# Patient Record
Sex: FEMALE | Race: WHITE | NOT HISPANIC OR LATINO | URBAN - METROPOLITAN AREA
[De-identification: names, ages, dates, MRNs, and addresses within clinical notes are randomized per-mention and may not be internally consistent; named-entity substitution may affect disease eponyms.]

---

## 2017-06-27 ENCOUNTER — HISTORICAL (OUTPATIENT)
Dept: LAB | Facility: HOSPITAL | Age: 82
End: 2017-06-27

## 2018-04-16 ENCOUNTER — HISTORICAL (OUTPATIENT)
Dept: ADMINISTRATIVE | Facility: HOSPITAL | Age: 83
End: 2018-04-16

## 2018-04-16 ENCOUNTER — HISTORICAL (OUTPATIENT)
Dept: LAB | Facility: HOSPITAL | Age: 83
End: 2018-04-16

## 2018-12-06 ENCOUNTER — HISTORICAL (OUTPATIENT)
Dept: LAB | Facility: HOSPITAL | Age: 83
End: 2018-12-06

## 2019-05-13 ENCOUNTER — HISTORICAL (OUTPATIENT)
Dept: LAB | Facility: HOSPITAL | Age: 84
End: 2019-05-13

## 2019-05-13 LAB
ALBUMIN SERPL-MCNC: 3.9 GM/DL (ref 3.4–5)
ALBUMIN/GLOB SERPL: 1.1 {RATIO}
ALP SERPL-CCNC: 61 UNIT/L (ref 38–126)
ALT SERPL-CCNC: 21 UNIT/L (ref 12–78)
APPEARANCE, UA: ABNORMAL
AST SERPL-CCNC: 18 UNIT/L (ref 15–37)
BACTERIA SPEC CULT: ABNORMAL /HPF
BILIRUB SERPL-MCNC: 0.4 MG/DL (ref 0.2–1)
BILIRUB UR QL STRIP: NEGATIVE
BILIRUBIN DIRECT+TOT PNL SERPL-MCNC: 0.1 MG/DL (ref 0–0.2)
BILIRUBIN DIRECT+TOT PNL SERPL-MCNC: 0.3 MG/DL (ref 0–0.8)
BUN SERPL-MCNC: 37 MG/DL (ref 7–18)
CALCIUM SERPL-MCNC: 9.4 MG/DL (ref 8.5–10.1)
CHLORIDE SERPL-SCNC: 102 MMOL/L (ref 98–107)
CO2 SERPL-SCNC: 29 MMOL/L (ref 21–32)
COLOR UR: YELLOW
CREAT SERPL-MCNC: 1.17 MG/DL (ref 0.55–1.02)
EST. AVERAGE GLUCOSE BLD GHB EST-MCNC: 183 MG/DL
GLOBULIN SER-MCNC: 3.6 GM/DL (ref 2.4–3.5)
GLUCOSE (UA): NEGATIVE
GLUCOSE SERPL-MCNC: 117 MG/DL (ref 74–106)
HBA1C MFR BLD: 8 % (ref 4.2–6.3)
HGB UR QL STRIP: ABNORMAL
KETONES UR QL STRIP: NEGATIVE
LEUKOCYTE ESTERASE UR QL STRIP: ABNORMAL
NITRITE UR QL STRIP: NEGATIVE
PH UR STRIP: 5 [PH] (ref 5–9)
POTASSIUM SERPL-SCNC: 4.2 MMOL/L (ref 3.5–5.1)
PROT SERPL-MCNC: 7.5 GM/DL (ref 6.4–8.2)
PROT UR QL STRIP: NEGATIVE
RBC #/AREA URNS HPF: 7 /HPF (ref 0–2)
SODIUM SERPL-SCNC: 137 MMOL/L (ref 136–145)
SP GR UR STRIP: 1.02 (ref 1–1.03)
SQUAMOUS EPITHELIAL, UA: 6 /HPF (ref 0–4)
UROBILINOGEN UR STRIP-ACNC: 0.2
WBC #/AREA URNS HPF: 299 /HPF (ref 0–3)

## 2022-04-07 ENCOUNTER — HISTORICAL (OUTPATIENT)
Dept: ADMINISTRATIVE | Facility: HOSPITAL | Age: 87
End: 2022-04-07

## 2022-04-24 VITALS
HEIGHT: 66 IN | DIASTOLIC BLOOD PRESSURE: 50 MMHG | WEIGHT: 155.44 LBS | SYSTOLIC BLOOD PRESSURE: 98 MMHG | BODY MASS INDEX: 24.98 KG/M2

## 2025-01-21 ENCOUNTER — HOSPITAL ENCOUNTER (INPATIENT)
Facility: HOSPITAL | Age: 89
LOS: 6 days | Discharge: NURSING, REHAB OR LONG-TERM CARE | DRG: 312 | End: 2025-01-27
Attending: EMERGENCY MEDICINE | Admitting: INTERNAL MEDICINE
Payer: MEDICARE

## 2025-01-21 DIAGNOSIS — R07.9 CHEST PAIN: ICD-10-CM
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
## 2025-01-21 DIAGNOSIS — I95.1 ORTHOSTATIC HYPOTENSION: ICD-10-CM

## 2025-01-21 DIAGNOSIS — R55 SYNCOPE, UNSPECIFIED SYNCOPE TYPE: ICD-10-CM

## 2025-01-21 DIAGNOSIS — S42.352A CLOSED DISPLACED COMMINUTED FRACTURE OF SHAFT OF LEFT HUMERUS, INITIAL ENCOUNTER: Primary | ICD-10-CM
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
## 2025-01-21 LAB
ALBUMIN SERPL-MCNC: 3.7 G/DL (ref 3.4–4.8)
ALBUMIN/GLOB SERPL: 1.1 RATIO (ref 1.1–2)
ALP SERPL-CCNC: 58 UNIT/L (ref 40–150)
ALT SERPL-CCNC: 12 UNIT/L (ref 0–55)
ANION GAP SERPL CALC-SCNC: 10 MEQ/L
AST SERPL-CCNC: 19 UNIT/L (ref 5–34)
BASOPHILS # BLD AUTO: 0.08 X10(3)/MCL
BASOPHILS NFR BLD AUTO: 0.5 %
BILIRUB SERPL-MCNC: 0.4 MG/DL
BUN SERPL-MCNC: 23.9 MG/DL (ref 9.8–20.1)
CALCIUM SERPL-MCNC: 9.9 MG/DL (ref 8.4–10.2)
CHLORIDE SERPL-SCNC: 105 MMOL/L (ref 98–107)
CO2 SERPL-SCNC: 23 MMOL/L (ref 23–31)
CREAT SERPL-MCNC: 0.91 MG/DL (ref 0.55–1.02)
CREAT/UREA NIT SERPL: 26
EOSINOPHIL # BLD AUTO: 0.02 X10(3)/MCL (ref 0–0.9)
EOSINOPHIL NFR BLD AUTO: 0.1 %
ERYTHROCYTE [DISTWIDTH] IN BLOOD BY AUTOMATED COUNT: 15.5 % (ref 11.5–17)
FERRITIN SERPL-MCNC: 10.11 NG/ML (ref 4.63–204)
GFR SERPLBLD CREATININE-BSD FMLA CKD-EPI: >60 ML/MIN/1.73/M2
GLOBULIN SER-MCNC: 3.3 GM/DL (ref 2.4–3.5)
GLUCOSE SERPL-MCNC: 255 MG/DL (ref 82–115)
HCT VFR BLD AUTO: 31.6 % (ref 37–47)
HGB BLD-MCNC: 9.8 G/DL (ref 12–16)
HOLD SPECIMEN: NORMAL
IMM GRANULOCYTES # BLD AUTO: 0.08 X10(3)/MCL (ref 0–0.04)
IMM GRANULOCYTES NFR BLD AUTO: 0.5 %
IRON SATN MFR SERPL: 8 % (ref 20–50)
IRON SERPL-MCNC: 26 UG/DL (ref 50–170)
LYMPHOCYTES # BLD AUTO: 1.15 X10(3)/MCL (ref 0.6–4.6)
LYMPHOCYTES NFR BLD AUTO: 7.8 %
MAGNESIUM SERPL-MCNC: 1.9 MG/DL (ref 1.6–2.6)
MCH RBC QN AUTO: 24.4 PG (ref 27–31)
MCHC RBC AUTO-ENTMCNC: 31 G/DL (ref 33–36)
MCV RBC AUTO: 78.8 FL (ref 80–94)
MONOCYTES # BLD AUTO: 0.47 X10(3)/MCL (ref 0.1–1.3)
MONOCYTES NFR BLD AUTO: 3.2 %
NEUTROPHILS # BLD AUTO: 13.01 X10(3)/MCL (ref 2.1–9.2)
NEUTROPHILS NFR BLD AUTO: 87.9 %
NRBC BLD AUTO-RTO: 0 %
PLATELET # BLD AUTO: 284 X10(3)/MCL (ref 130–400)
PLATELETS.RETICULATED NFR BLD AUTO: 1.5 % (ref 0.9–11.2)
PMV BLD AUTO: 9.2 FL (ref 7.4–10.4)
POCT GLUCOSE: 216 MG/DL (ref 70–110)
POTASSIUM SERPL-SCNC: 4.2 MMOL/L (ref 3.5–5.1)
PROT SERPL-MCNC: 7 GM/DL (ref 5.8–7.6)
RBC # BLD AUTO: 4.01 X10(6)/MCL (ref 4.2–5.4)
SODIUM SERPL-SCNC: 138 MMOL/L (ref 136–145)
T4 FREE SERPL-MCNC: 1.21 NG/DL (ref 0.7–1.48)
TIBC SERPL-MCNC: 319 UG/DL (ref 70–310)
TIBC SERPL-MCNC: 345 UG/DL (ref 250–450)
TRANSFERRIN SERPL-MCNC: 312 MG/DL
TSH SERPL-ACNC: 1.27 UIU/ML (ref 0.35–4.94)
WBC # BLD AUTO: 14.81 X10(3)/MCL (ref 4.5–11.5)

## 2025-01-21 PROCEDURE — 93005 ELECTROCARDIOGRAM TRACING: CPT

## 2025-01-21 PROCEDURE — 84439 ASSAY OF FREE THYROXINE: CPT

## 2025-01-21 PROCEDURE — 85025 COMPLETE CBC W/AUTO DIFF WBC: CPT | Performed by: EMERGENCY MEDICINE

## 2025-01-21 PROCEDURE — 63600175 PHARM REV CODE 636 W HCPCS

## 2025-01-21 PROCEDURE — 82728 ASSAY OF FERRITIN: CPT

## 2025-01-21 PROCEDURE — 84443 ASSAY THYROID STIM HORMONE: CPT

## 2025-01-21 PROCEDURE — 83550 IRON BINDING TEST: CPT

## 2025-01-21 PROCEDURE — 11000001 HC ACUTE MED/SURG PRIVATE ROOM

## 2025-01-21 PROCEDURE — 80053 COMPREHEN METABOLIC PANEL: CPT | Performed by: EMERGENCY MEDICINE

## 2025-01-21 PROCEDURE — 83735 ASSAY OF MAGNESIUM: CPT | Performed by: EMERGENCY MEDICINE

## 2025-01-21 PROCEDURE — 25000003 PHARM REV CODE 250

## 2025-01-21 PROCEDURE — 63600175 PHARM REV CODE 636 W HCPCS: Performed by: EMERGENCY MEDICINE

## 2025-01-21 RX ORDER — INSULIN ASPART 100 [IU]/ML
0-5 INJECTION, SOLUTION INTRAVENOUS; SUBCUTANEOUS
Status: DISCONTINUED | OUTPATIENT
Start: 2025-01-21 | End: 2025-01-27 | Stop reason: HOSPADM

## 2025-01-21 RX ORDER — GLUCAGON 1 MG
1 KIT INJECTION
Status: DISCONTINUED | OUTPATIENT
Start: 2025-01-21 | End: 2025-01-27 | Stop reason: HOSPADM

## 2025-01-21 RX ORDER — SODIUM CHLORIDE 0.9 % (FLUSH) 0.9 %
10 SYRINGE (ML) INJECTION EVERY 12 HOURS PRN
Status: DISCONTINUED | OUTPATIENT
Start: 2025-01-21 | End: 2025-01-27 | Stop reason: HOSPADM

## 2025-01-21 RX ORDER — SODIUM CHLORIDE, SODIUM LACTATE, POTASSIUM CHLORIDE, CALCIUM CHLORIDE 600; 310; 30; 20 MG/100ML; MG/100ML; MG/100ML; MG/100ML
INJECTION, SOLUTION INTRAVENOUS CONTINUOUS
Status: ACTIVE | OUTPATIENT
Start: 2025-01-21 | End: 2025-01-22

## 2025-01-21 RX ORDER — FENTANYL CITRATE 50 UG/ML
50 INJECTION, SOLUTION INTRAMUSCULAR; INTRAVENOUS EVERY 6 HOURS PRN
Status: DISCONTINUED | OUTPATIENT
Start: 2025-01-21 | End: 2025-01-21

## 2025-01-21 RX ORDER — LISINOPRIL 10 MG/1
20 TABLET ORAL DAILY
Status: DISCONTINUED | OUTPATIENT
Start: 2025-01-22 | End: 2025-01-27

## 2025-01-21 RX ORDER — IBUPROFEN 200 MG
24 TABLET ORAL
Status: DISCONTINUED | OUTPATIENT
Start: 2025-01-21 | End: 2025-01-27 | Stop reason: HOSPADM

## 2025-01-21 RX ORDER — ALUMINUM HYDROXIDE, MAGNESIUM HYDROXIDE, AND SIMETHICONE 1200; 120; 1200 MG/30ML; MG/30ML; MG/30ML
30 SUSPENSION ORAL
Status: DISCONTINUED | OUTPATIENT
Start: 2025-01-21 | End: 2025-01-27 | Stop reason: HOSPADM

## 2025-01-21 RX ORDER — LEVOTHYROXINE SODIUM 100 UG/1
100 TABLET ORAL
Status: DISCONTINUED | OUTPATIENT
Start: 2025-01-22 | End: 2025-01-27 | Stop reason: HOSPADM

## 2025-01-21 RX ORDER — INSULIN GLARGINE 100 [IU]/ML
10 INJECTION, SOLUTION SUBCUTANEOUS NIGHTLY
Status: DISCONTINUED | OUTPATIENT
Start: 2025-01-21 | End: 2025-01-25

## 2025-01-21 RX ORDER — MEMANTINE HYDROCHLORIDE 5 MG/1
10 TABLET ORAL 2 TIMES DAILY
Status: DISCONTINUED | OUTPATIENT
Start: 2025-01-21 | End: 2025-01-27 | Stop reason: HOSPADM

## 2025-01-21 RX ORDER — IBUPROFEN 200 MG
16 TABLET ORAL
Status: DISCONTINUED | OUTPATIENT
Start: 2025-01-21 | End: 2025-01-27 | Stop reason: HOSPADM

## 2025-01-21 RX ORDER — KETOROLAC TROMETHAMINE 30 MG/ML
15 INJECTION, SOLUTION INTRAMUSCULAR; INTRAVENOUS EVERY 6 HOURS PRN
Status: DISPENSED | OUTPATIENT
Start: 2025-01-21 | End: 2025-01-24

## 2025-01-21 RX ORDER — NALOXONE HCL 0.4 MG/ML
0.02 VIAL (ML) INJECTION
Status: DISCONTINUED | OUTPATIENT
Start: 2025-01-21 | End: 2025-01-27 | Stop reason: HOSPADM

## 2025-01-21 RX ORDER — ATORVASTATIN CALCIUM 20 MG/1
20 TABLET, FILM COATED ORAL DAILY
Status: DISCONTINUED | OUTPATIENT
Start: 2025-01-22 | End: 2025-01-27 | Stop reason: HOSPADM

## 2025-01-21 RX ORDER — ACETAMINOPHEN 325 MG/1
650 TABLET ORAL EVERY 6 HOURS PRN
Status: DISCONTINUED | OUTPATIENT
Start: 2025-01-21 | End: 2025-01-27 | Stop reason: HOSPADM

## 2025-01-21 RX ORDER — FENTANYL CITRATE 50 UG/ML
50 INJECTION, SOLUTION INTRAMUSCULAR; INTRAVENOUS
Status: DISCONTINUED | OUTPATIENT
Start: 2025-01-21 | End: 2025-01-22

## 2025-01-21 RX ORDER — ENOXAPARIN SODIUM 100 MG/ML
40 INJECTION SUBCUTANEOUS EVERY 24 HOURS
Status: DISCONTINUED | OUTPATIENT
Start: 2025-01-21 | End: 2025-01-27 | Stop reason: HOSPADM

## 2025-01-21 RX ADMIN — KETOROLAC TROMETHAMINE 15 MG: 30 INJECTION, SOLUTION INTRAMUSCULAR; INTRAVENOUS at 11:01

## 2025-01-21 RX ADMIN — ALUMINUM HYDROXIDE, MAGNESIUM HYDROXIDE, AND DIMETHICONE 30 ML: 200; 20; 200 SUSPENSION ORAL at 09:01

## 2025-01-21 RX ADMIN — SODIUM CHLORIDE, POTASSIUM CHLORIDE, SODIUM LACTATE AND CALCIUM CHLORIDE 1000 ML: 600; 310; 30; 20 INJECTION, SOLUTION INTRAVENOUS at 02:01

## 2025-01-21 RX ADMIN — FENTANYL CITRATE 50 MCG: 50 INJECTION, SOLUTION INTRAMUSCULAR; INTRAVENOUS at 06:01

## 2025-01-21 RX ADMIN — FENTANYL CITRATE 50 MCG: 50 INJECTION, SOLUTION INTRAMUSCULAR; INTRAVENOUS at 04:01

## 2025-01-21 RX ADMIN — INSULIN GLARGINE 10 UNITS: 100 INJECTION, SOLUTION SUBCUTANEOUS at 09:01

## 2025-01-21 RX ADMIN — SODIUM CHLORIDE, POTASSIUM CHLORIDE, SODIUM LACTATE AND CALCIUM CHLORIDE: 600; 310; 30; 20 INJECTION, SOLUTION INTRAVENOUS at 07:01

## 2025-01-21 RX ADMIN — INSULIN ASPART 1 UNITS: 100 INJECTION, SOLUTION INTRAVENOUS; SUBCUTANEOUS at 09:01

## 2025-01-21 RX ADMIN — MEMANTINE HYDROCHLORIDE 10 MG: 5 TABLET ORAL at 09:01

## 2025-01-21 NOTE — ED PROVIDER NOTES
Encounter Date: 1/21/2025       History     Chief Complaint   Patient presents with    Fall    Loss of Consciousness    Shoulder Pain     Presents via EMS. Sustained a ground level fall at home after trip and slip (states she tripped over a shoe; did not hit head, no LOC) and called EMS. Upon arrival EMS witnessed pt trip and fall a second time (again a trip over an obstacle while walking, no LOC). EMS assisted up to a chair when pt had sudden bradycardia and hypotension causing syncope (did not hit head).     Patient presents emergency department via EMS for fall and right shoulder pain.  Patient states that her slipper came off she slipped on it when she was trying to but the slipper back on she tripped and fell landing on her right shoulder.  She has been having chronic shoulder pain in her right shoulder for several months in the fall worsened it.  Due to the pain EMS was called by family.  When EMS arrived they stood the patient up after standing her up she immediately felt lightheaded they put her in the chair she vomited and then passed out.  Her blood pressure on the ground was initially within normal limits and after standing up and passing out systolic blood pressure was in the 60s.  She regained consciousness in her blood pressure went back to normotensive.  In the emergency department patient is alert oriented x3.  She is able to recall what happened denies hitting her head.  Denies any pain other than in her right shoulder at this time no recent loss of fluids including no recent vomiting or diarrhea except for the 1 episode of vomiting per EMS.  Patient denies being on any blood thinners.      Review of patient's allergies indicates:   Allergen Reactions    Cefdinir Hives     History reviewed. No pertinent past medical history.  History reviewed. No pertinent surgical history.  No family history on file.  Social History     Tobacco Use    Smoking status: Never     Passive exposure: Never    Smokeless  tobacco: Never   Substance Use Topics    Alcohol use: Never    Drug use: Never     Review of Systems   Musculoskeletal:         Right shoulder pain   Neurological:  Positive for syncope.   All other systems reviewed and are negative.      Physical Exam     Initial Vitals [01/21/25 1403]   BP Pulse Resp Temp SpO2   113/70 99 20 97 °F (36.1 °C) 97 %      MAP       --         Physical Exam    Constitutional: She appears well-developed and well-nourished. No distress.   HENT:   Head: Normocephalic and atraumatic.   Dry oral mucous membranes.  Patient able to move neck the upright up and down without any pain elicited   Eyes: EOM are normal. Pupils are equal, round, and reactive to light.   Neck:   Normal range of motion.  Cardiovascular:  Normal rate, regular rhythm and normal heart sounds.           Pulmonary/Chest: Breath sounds normal. No stridor. No respiratory distress. She has no wheezes.   Abdominal: Abdomen is soft. Bowel sounds are normal. She exhibits no distension. There is no abdominal tenderness.   Musculoskeletal:         General: Normal range of motion.      Cervical back: Normal range of motion.      Comments: Pain with active and passive motion of right shoulder no crepitus no bruising no deformity of right shoulder.  Patient able to move all other extremities actively without any distress.  No cervical thoracic or lumbar tenderness to palpation with no step-offs     Neurological: She is alert and oriented to person, place, and time. No cranial nerve deficit.   Psychiatric: She has a normal mood and affect.         ED Course   Procedures  Labs Reviewed   COMPREHENSIVE METABOLIC PANEL - Abnormal       Result Value    Sodium 138      Potassium 4.2      Chloride 105      CO2 23      Glucose 255 (*)     Blood Urea Nitrogen 23.9 (*)     Creatinine 0.91      Calcium 9.9      Protein Total 7.0      Albumin 3.7      Globulin 3.3      Albumin/Globulin Ratio 1.1      Bilirubin Total 0.4      ALP 58      ALT 12       AST 19      eGFR >60      Anion Gap 10.0      BUN/Creatinine Ratio 26     CBC WITH DIFFERENTIAL - Abnormal    WBC 14.81 (*)     RBC 4.01 (*)     Hgb 9.8 (*)     Hct 31.6 (*)     MCV 78.8 (*)     MCH 24.4 (*)     MCHC 31.0 (*)     RDW 15.5      Platelet 284      MPV 9.2      IPF 1.5      Neut % 87.9      Lymph % 7.8      Mono % 3.2      Eos % 0.1      Basophil % 0.5      Imm Grans % 0.5      Neut # 13.01 (*)     Lymph # 1.15      Mono # 0.47      Eos # 0.02      Baso # 0.08      Imm Gran # 0.08 (*)     NRBC% 0.0     IRON AND TIBC - Abnormal    Iron Binding Capacity Unsaturated 319 (*)     Iron Level 26 (*)     Transferrin 312      Iron Binding Capacity Total 345      Iron Saturation 8 (*)    MAGNESIUM - Normal    Magnesium Level 1.90     TSH - Normal    TSH 1.267     T4, FREE - Normal    Thyroxine Free 1.21     FERRITIN - Normal    Ferritin Level 10.11     CBC W/ AUTO DIFFERENTIAL    Narrative:     The following orders were created for panel order CBC auto differential.  Procedure                               Abnormality         Status                     ---------                               -----------         ------                     CBC with Differential[[id number]]       Abnormal            Final result                 Please view results for these tests on the individual orders.   EXTRA TUBES    Narrative:     The following orders were created for panel order EXTRA TUBES.  Procedure                               Abnormality         Status                     ---------                               -----------         ------                     Light Blue Top Hold[[id number]]                             Final result                 Please view results for these tests on the individual orders.   LIGHT BLUE TOP HOLD    Extra Tube Hold for add-ons.     EXTRA TUBES    Narrative:     The following orders were created for panel order EXTRA TUBES.  Procedure                               Abnormality          Status                     ---------                               -----------         ------                     Light Blue Top Hold[[id number]]                             Final result               Red Top Hold[[id number]]                                    Final result               Light Green Top Hold[[id number]]                            Final result               Lavender Top Hold[[id number]]                               Final result               Gold Top Hold[[id number]]                                   Final result               Pink Top Hold[[id number]]                                   Final result                 Please view results for these tests on the individual orders.   LIGHT BLUE TOP HOLD    Extra Tube Hold for add-ons.     RED TOP HOLD    Extra Tube Hold for add-ons.     LIGHT GREEN TOP HOLD    Extra Tube Hold for add-ons.     LAVENDER TOP HOLD    Extra Tube Hold for add-ons.     GOLD TOP HOLD    Extra Tube Hold for add-ons.     PINK TOP HOLD    Extra Tube Hold for add-ons.     POCT GLUCOSE MONITORING CONTINUOUS   POCT GLUCOSE MONITORING CONTINUOUS     EKG Readings: (Independently Interpreted)   Time 3:26 p.m.  Rate of 100, sinus rhythm with PACs, normal axis intervals no concerning ST depressions or elevations       Imaging Results              CT Arm (Humerus) Without Contrast Right (Final result)  Result time 01/21/25 15:19:29      Final result by Yaneli Lemus MD (01/21/25 15:19:29)                   Impression:      Impacted fracture of the proximal humerus      Electronically signed by: Yaneli Lemus  Date:    01/21/2025  Time:    15:19               Narrative:    EXAMINATION:  CT ARM (HUMERUS) WITHOUT CONTRAST RIGHT    CLINICAL HISTORY:  arm fx;    TECHNIQUE:  Helically acquired imaging through the right shoulder were obtained without the IV administration of contrast. Axial, sagittal and coronal reformations were created and interpreted.    Automated tube current  modulation, weight-based exposure dosing, and/or iterative reconstruction technique utilized to reach lowest reasonably achievable exposure rate.    DLP: 332 mGy*cm    COMPARISON:  Plain radiograph right shoulder 01/21/2025    FINDINGS:  BONES/JOINTS: Comminuted, impacted fracture of the humeral head/neck and greater tuberosity.  There is a shoulder joint effusion with mild subluxation.  Degenerative change at the acromioclavicular joint.  Subtle cortical contour deformity at right 3rd and 4th ribs anterolaterally.    SOFT TISSUES: Soft tissue swelling.    OTHER: N/A                                       CT Head Without Contrast (Final result)  Result time 01/21/25 15:06:40      Final result by Yaneli Lemus MD (01/21/25 15:06:40)                   Impression:      No appreciable acute intracranial abnormality.    Periventricular and subcortical white matter changes and cerebral atrophy most compatible with chronic small vessel ischemic disease.      Electronically signed by: Yaneli Lemus  Date:    01/21/2025  Time:    15:06               Narrative:    EXAMINATION:  CT HEAD WITHOUT CONTRAST    CLINICAL HISTORY:  fall, old;    TECHNIQUE:  Low dose axial CT images obtained throughout the head without intravenous contrast.  Axial, sagittal and coronal reconstructions were performed and interpreted.    DLP: 862 mGycm    All CT scans at this location are performed using dose optimization techniques as appropriate to a performed exam including the following automated exposure control, adjustment of the mA and/or kV according to patient size and/or use of iterative reconstruction technique    COMPARISON:  No relevant prior available for comparison.    FINDINGS:  BRAIN: Gray white differentiation is maintained. Periventricular and subcortical white matter changes most compatible with chronic small vessel ischemic disease.  Moderate cerebral atrophy.  No hemorrhage. No edema. No mass effect or midline shift.  The  posterior fossa and midline structures are unremarkable.    VENTRICLES: Ex vacuo dilatation of the ventricles.    EXTRA-AXIAL: No abnormal extra-axial collections.    BONES: Calvarium is intact.    SINUSES AND MASTOIDS: Mucoperiosteal thickening at the right maxillary sinus.                                       X-Ray Shoulder Complete 2 View Right (Final result)  Result time 01/21/25 14:49:41      Final result by Yaneli Lemus MD (01/21/25 14:49:41)                   Impression:      Proximal humerus fracture.      Electronically signed by: Yaneli Lemus  Date:    01/21/2025  Time:    14:49               Narrative:    EXAMINATION:  XR SHOULDER COMPLETE 2 OR MORE VIEWS RIGHT    CLINICAL HISTORY:  Unspecified fall, initial encounter    TECHNIQUE:  Three views of the right shoulder were performed.    COMPARISON  None    FINDINGS:  BONES: Impacted fracture at the humeral head/neck with involvement of the greater tuberosity.  Inferior subluxation of the humeral head.    SOFT TISSUES:  Regional soft tissues are normal.                                       Medications   fentaNYL injection 50 mcg (50 mcg Intravenous Given 1/21/25 1806)   atorvastatin tablet 20 mg (has no administration in time range)   levothyroxine tablet 100 mcg (has no administration in time range)   lisinopriL tablet 20 mg (has no administration in time range)   memantine tablet 10 mg (has no administration in time range)   aluminum-magnesium hydroxide-simethicone 200-200-20 mg/5 mL suspension 30 mL (has no administration in time range)   sodium chloride 0.9% flush 10 mL (has no administration in time range)   naloxone 0.4 mg/mL injection 0.02 mg (has no administration in time range)   glucose chewable tablet 16 g (has no administration in time range)   glucose chewable tablet 24 g (has no administration in time range)   dextrose 50% injection 12.5 g (has no administration in time range)   dextrose 50% injection 25 g (has no administration  in time range)   glucagon (human recombinant) injection 1 mg (has no administration in time range)   enoxaparin injection 40 mg (has no administration in time range)   insulin aspart U-100 injection 0-5 Units (has no administration in time range)   insulin glargine U-100 (Lantus) injection 10 Units (has no administration in time range)   lactated ringers infusion ( Intravenous New Bag 1/21/25 1923)   acetaminophen tablet 650 mg (has no administration in time range)   ketorolac injection 15 mg (has no administration in time range)   lactated ringers bolus 1,000 mL (0 mLs Intravenous Stopped 1/21/25 1705)     Medical Decision Making  Amount and/or Complexity of Data Reviewed  Labs: ordered.  Radiology: ordered. Decision-making details documented in ED Course.    Risk  Prescription drug management.  Decision regarding hospitalization.               ED Course as of 01/21/25 2119 Tue Jan 21, 2025   1514 CT Arm (Humerus) Without Contrast Right [DL]      ED Course User Index  [DL] Prosper Bernstein MD               Medical Decision Making:   Initial Assessment:   Overall patient well-appearing stable vitals in the emergency department.  Symptoms most consistent with orthostatic syncope however due to age will perform workup this time including CT of head although she did not hit her head due to age will CT head as well as basic labs.  Patient has dry mucous membranes we will also provide a L of fluids.  Differential Diagnosis:   Orthostatic syncope, dehydration, arrhythmia, hypoglycemia, anemia, electrolyte imbalance  ED Management:  Labs within normal limits nonsignificant CT no acute abnormalities however patient does have impacted comminuted humeral head fracture on right with subluxation.  CT also shows comminuted fracture of humeral head impacted with subluxation.  Upon chart review patient had TIA was at [facility] early November for TIA and was on DAPT for 21 days and plavix thereafter. Will transfer to [facility] for [facility]  level of care due to age and concern for patient safety af home per home health notes 12/20.             Clinical Impression:  Final diagnoses:  [W19.XXXA] Fall  [S42.352A] Closed displaced comminuted fracture of shaft of left humerus, initial encounter (Primary)  [R55] Syncope, unspecified syncope type  [W19.XXXA] Fall from standing, initial encounter  [I95.1] Orthostatic hypotension          ED Disposition Condition    Admit Stable                Prosper Bernstein MD  01/21/25 6596

## 2025-01-21 NOTE — H&P
[facility] History and Physical     Patient Name: Brittany Nunn  MRN: [id number]  Admission Date: 1/21/2025  Hospital Length of Stay: 0 days  Code Status: Full Code  Attending Provider: Neil Mendoza MD  Primary Care Provider: Ester, Primary Doctor     Chief Complaint:   Fall, Loss of Consciousness, and Shoulder Pain (Presents via EMS. Sustained a ground level fall at home after trip and slip (states she tripped over a shoe; did not hit head, no LOC) and called EMS. Upon arrival EMS witnessed pt trip and fall a second time (again a trip over an obstacle while walking, no LOC). EMS assisted up to a chair when pt had sudden bradycardia and hypotension causing syncope (did not hit head).)      Subjective:      Brief HPI:  Brittany Nunn is a 89 y.o. female with past medical history of CVA/TIA, Alzheimer's, hyperlipidemia, GERD, hypothyroidism, hypertension, ovarian cancer s/p resection (refused chemotherapy after 1 session following procedure) and diabetes on insulin.  She presented to [facility] on 1/21/2025  with a primary complaint of fall on right shoulder.  Patient reports that she was distracted and slipped over her shoe and fell on her right shoulder.  She reported pain in that shoulder prior to the fall that had been going on for weeks.  She was unable to get up off the ground until EMS arrived and helped her up.  Patient's history deviates from this point from EMS accounts, likely secondary to history of Alzheimer's.  EMS reported that when patient was picked up off the ground she became hypotensive and bradycardic, she felt nauseated and subsequently vomited 1 time.  Following this episode patient loss consciousness for a few moments.  Patient and EMS agree there was no trauma to her head.  Patient lives at home with 91-year-old  and AIDS and taking care of him.  She also lives with her daughter-in-law who has a complicated drug his in his unable to confidently provide her with  support necessary to take care of her following this trauma. Patient reports pain in her right shoulder.  Patient denies weakness, dizziness, and loss of consciousness.  Patient denies any tobacco, recreational drug use, or alcohol history.    ED course:  Patient presented afebrile, vital signs stable, saturating 97% on room air.  CBC significant for leukocytosis WBC 14.81, microcytic anemia H/H 9.8/31.6.  CMP significant for BUN/creatinine 23.9/0.91, otherwise unremarkable.  CT head without contrast showed Periventricular and subcortical white matter changes and cerebral atrophy most compatible with chronic small vessel ischemic disease.  X-ray and CT arm of the right shoulder shows impacted proximal humerus fracture.  ED physician consulted Orthopedic surgery, determined patient requires no surgical intervention at this time.  She will likely need to follow up 10 days following discharge with Orthopedic surgery.  Internal medicine was consulted for orthostatic hypotension and right shoulder fracture.        Patient family history is not on file.       Patient  has no past surgical history on file.    Patient is allergic to cefdinir.    Patient  reports that she has never smoked. She has never been exposed to tobacco smoke. She has never used smokeless tobacco. She reports that she does not drink alcohol and does not use drugs.     No current outpatient medications       Review of Systems:  The remainder of the 14 point ROS is noncontributory or negative unless mentioned/reviewed above.     Objective:     Vital Signs:  Vital Signs (Most Recent):  Temp: 97 °F (36.1 °C) (01/21/25 1403)  Pulse: 99 (01/21/25 1403)  Resp: 20 (01/21/25 1601)  BP: 113/70 (01/21/25 1403)  SpO2: 97 % (01/21/25 1403)  Body mass index is 25.56 kg/m².  Weight: 70.8 kg (156 lb) Vital Signs (24h Range):  Temp:  [97 °F (36.1 °C)] 97 °F (36.1 °C)  Pulse:  [99] 99  Resp:  [20] 20  SpO2:  [97 %] 97 %  BP: (113)/(70) 113/70       Input/output:  "    Intake/Output Summary (Last 24 hours) at 1/21/2025 1748  Last data filed at 1/21/2025 1705  Gross per 24 hour   Intake 1000 ml   Output --   Net 1000 ml       Physical Exam  Constitutional:       General: She is not in acute distress.     Appearance: Normal appearance. She is not ill-appearing.   HENT:      Head: Normocephalic and atraumatic.      Mouth/Throat:      Mouth: Mucous membranes are dry.      Pharynx: Oropharynx is clear.   Eyes:      Extraocular Movements: Extraocular movements intact.      Conjunctiva/sclera: Conjunctivae normal.   Cardiovascular:      Rate and Rhythm: Normal rate and regular rhythm.      Pulses: Normal pulses.      Heart sounds: Normal heart sounds. No murmur heard.     No friction rub. No gallop.   Pulmonary:      Effort: Pulmonary effort is normal. No respiratory distress.      Breath sounds: Normal breath sounds. No stridor. No wheezing, rhonchi or rales.   Chest:      Chest wall: No tenderness.   Abdominal:      General: Abdomen is flat. Bowel sounds are normal. There is no distension.      Palpations: Abdomen is soft. There is no mass.      Tenderness: There is no abdominal tenderness. There is no guarding or rebound.      Hernia: No hernia is present.   Musculoskeletal:         General: Signs of injury (Right shoulder) present. No swelling.      Cervical back: Normal range of motion.   Skin:     General: Skin is warm and dry.      Capillary Refill: Capillary refill takes less than 2 seconds.      Coloration: Skin is pale.   Neurological:      General: No focal deficit present.      Mental Status: She is alert and oriented to person, place, and time.   Psychiatric:         Mood and Affect: Mood normal.         Behavior: Behavior normal.          Lines/Drains/Airways       None                    Laboratory:    Recent Labs   Lab 01/21/25  1425   WBC 14.81*   HGB 9.8*   HCT 31.6*      MCV 78.8*   RDW 15.5     No results for input(s): "TROPONINI", "CKTOTAL", "CKMB", "BNP" " "in the last 24 hours.  No results for input(s): "TROPONINI", "CKTOTAL", "CKMB", "BNP" in the last 168 hours.  No results for input(s): "CHOL", "HDL", "LDLCALC", "TRIG", "CHOLHDL" in the last 168 hours. Recent Labs   Lab 01/21/25  1541      K 4.2   CO2 23   BUN 23.9*   CREATININE 0.91   CALCIUM 9.9   MG 1.90     Recent Labs   Lab 01/21/25  1541   ALBUMIN 3.7   BILITOT 0.4   AST 19   ALKPHOS 58   ALT 12     No results for input(s): "IRON", "TIBC", "FERRITIN", "SATURATEDIRO", "UVRTGRVB72", "FOLATE" in the last 168 hours.  No results for input(s): "TSH", "V5JCHFP", "HGBA1C", "INR", "PROTIME", "PTT" in the last 168 hours.       Other Results:  Estimated Creatinine Clearance: 43.5 mL/min (based on SCr of 0.91 mg/dL).    Current Medications:     Infusions:   lactated ringers   Intravenous Continuous             Scheduled:   aluminum-magnesium hydroxide-simethicone  30 mL Oral QID (AC & HS)    [START ON 1/22/2025] atorvastatin  20 mg Oral Daily    enoxparin  40 mg Subcutaneous Daily    insulin glargine U-100  10 Units Subcutaneous QHS    [START ON 1/22/2025] levothyroxine  100 mcg Oral Before breakfast    [START ON 1/22/2025] lisinopriL  20 mg Oral Daily    memantine  10 mg Oral BID         PRN:   aluminum-magnesium hydroxide-simethicone 200-200-20 mg/5 mL suspension 30 mL    [START ON 1/22/2025] atorvastatin tablet 20 mg    enoxaparin injection 40 mg    insulin glargine U-100 (Lantus) injection 10 Units    [START ON 1/22/2025] levothyroxine tablet 100 mcg    [START ON 1/22/2025] lisinopriL tablet 20 mg    memantine tablet 10 mg        Microbiology Data:  Microbiology Results (last 7 days)       ** No results found for the last 168 hours. **             Antibiotics and Day Number of Therapy:  Antibiotics (From admission, onward)      None             Imaging:  CT Arm (Humerus) Without Contrast Right  Narrative: EXAMINATION:  CT ARM (HUMERUS) WITHOUT CONTRAST RIGHT    CLINICAL HISTORY:  arm fx;    TECHNIQUE:  Helically " acquired imaging through the right shoulder were obtained without the IV administration of contrast. Axial, sagittal and coronal reformations were created and interpreted.    Automated tube current modulation, weight-based exposure dosing, and/or iterative reconstruction technique utilized to reach lowest reasonably achievable exposure rate.    DLP: 332 mGy*cm    COMPARISON:  Plain radiograph right shoulder 01/21/2025    FINDINGS:  BONES/JOINTS: Comminuted, impacted fracture of the humeral head/neck and greater tuberosity.  There is a shoulder joint effusion with mild subluxation.  Degenerative change at the acromioclavicular joint.  Subtle cortical contour deformity at right 3rd and 4th ribs anterolaterally.    SOFT TISSUES: Soft tissue swelling.    OTHER: N/A  Impression: Impacted fracture of the proximal humerus    Electronically signed by: Yaneli Lemus  Date:    01/21/2025  Time:    15:19  CT Head Without Contrast  Narrative: EXAMINATION:  CT HEAD WITHOUT CONTRAST    CLINICAL HISTORY:  fall, old;    TECHNIQUE:  Low dose axial CT images obtained throughout the head without intravenous contrast.  Axial, sagittal and coronal reconstructions were performed and interpreted.    DLP: 862 mGycm    All CT scans at this location are performed using dose optimization techniques as appropriate to a performed exam including the following automated exposure control, adjustment of the mA and/or kV according to patient size and/or use of iterative reconstruction technique    COMPARISON:  No relevant prior available for comparison.    FINDINGS:  BRAIN: Gray white differentiation is maintained. Periventricular and subcortical white matter changes most compatible with chronic small vessel ischemic disease.  Moderate cerebral atrophy.  No hemorrhage. No edema. No mass effect or midline shift.  The posterior fossa and midline structures are unremarkable.    VENTRICLES: Ex vacuo dilatation of the ventricles.    EXTRA-AXIAL: No  "abnormal extra-axial collections.    BONES: Calvarium is intact.    SINUSES AND MASTOIDS: Mucoperiosteal thickening at the right maxillary sinus.  Impression: No appreciable acute intracranial abnormality.    Periventricular and subcortical white matter changes and cerebral atrophy most compatible with chronic small vessel ischemic disease.    Electronically signed by: Yaneli Lemus  Date:    01/21/2025  Time:    15:06  X-Ray Shoulder Complete 2 View Right  Narrative: EXAMINATION:  XR SHOULDER COMPLETE 2 OR MORE VIEWS RIGHT    CLINICAL HISTORY:  Unspecified fall, initial encounter    TECHNIQUE:  Three views of the right shoulder were performed.    COMPARISON  None    FINDINGS:  BONES: Impacted fracture at the humeral head/neck with involvement of the greater tuberosity.  Inferior subluxation of the humeral head.    SOFT TISSUES:  Regional soft tissues are normal.  Impression: Proximal humerus fracture.    Electronically signed by: Yaneli Lemus  Date:    01/21/2025  Time:    14:49        2D ECHO Results    No results found for this or any previous visit.        Pulmonary Functions Testing Results:    No results found for: "FEV1", "FVC", "ASK8KAM", "TLC", "DLCO"    Assessment & Plan:     Orthostatic hypotension  Dehydration  -EMS reported drop in blood pressure and heart rate prior to loss of consciousness  -patient received 1 L LR in the ED  -started patient on  for 10 hours    Microcytic anemia  -patient has prior history of iron infusions in 2015, reports she only did this for a short period of time.  Reports she is unsure what caused her iron to be low at that time.  -ordered iron studies    Alzheimer's vs vascular dementia  -CT head without contrast showed Periventricular and subcortical white matter changes and cerebral atrophy most compatible with chronic small vessel ischemic disease.  -continue home dose memantine 10 mg b.i.d.    Diabetes  -holding home meds  -started on Lantus 10 units q.h.s. " and low-dose sliding scale    Right shoulder fracture  -X-ray and CT arm of the right shoulder shows impacted proximal humerus fracture  -E consult to Orthopedic surgery, recommended no surgical intervention at this time.  Follow up in 10 days following discharge.  -p.r.n. pain meds     Hypothyroidism  -continue home dose levothyroxine 100 mcg q.d.  -ordered TSH and free T4    Hypertension  Hyperlipidemia  -continue home meds:  Atorvastatin 20 mg q.d., lisinopril 20 mg q.d.        CODE STATUS: Full Code   Access: [facility]  Antibiotics: -  Diet: Diet diabetic 2000 Calories (up to 75 gm per meal)  DVT Prophylaxis: Lovenox   GI Prophylaxis: famotidine/ carafate  Fluids:   for 10 hours      Disposition: Brittany Nunn is a 89 y.o. female who is admitted for orthostatic hypotension with right proximal humerus fracture.      Shima Kumar DO  [facility] Internal Medicine, PGY-1  01/21/2025

## 2025-01-22 LAB
ALBUMIN SERPL-MCNC: 3.4 G/DL (ref 3.4–4.8)
ALBUMIN/GLOB SERPL: 1.1 RATIO (ref 1.1–2)
ALP SERPL-CCNC: 53 UNIT/L (ref 40–150)
ALT SERPL-CCNC: 11 UNIT/L (ref 0–55)
ANION GAP SERPL CALC-SCNC: 8 MEQ/L
AST SERPL-CCNC: 18 UNIT/L (ref 5–34)
BASOPHILS # BLD AUTO: 0.05 X10(3)/MCL
BASOPHILS NFR BLD AUTO: 0.5 %
BILIRUB SERPL-MCNC: 0.7 MG/DL
BUN SERPL-MCNC: 18.3 MG/DL (ref 9.8–20.1)
CALCIUM SERPL-MCNC: 9.4 MG/DL (ref 8.4–10.2)
CHLORIDE SERPL-SCNC: 104 MMOL/L (ref 98–107)
CO2 SERPL-SCNC: 26 MMOL/L (ref 23–31)
CREAT SERPL-MCNC: 0.72 MG/DL (ref 0.55–1.02)
CREAT/UREA NIT SERPL: 25
EOSINOPHIL # BLD AUTO: 0.05 X10(3)/MCL (ref 0–0.9)
EOSINOPHIL NFR BLD AUTO: 0.5 %
ERYTHROCYTE [DISTWIDTH] IN BLOOD BY AUTOMATED COUNT: 15.4 % (ref 11.5–17)
GFR SERPLBLD CREATININE-BSD FMLA CKD-EPI: >60 ML/MIN/1.73/M2
GLOBULIN SER-MCNC: 3 GM/DL (ref 2.4–3.5)
GLUCOSE SERPL-MCNC: 107 MG/DL (ref 82–115)
HCT VFR BLD AUTO: 28.5 % (ref 37–47)
HGB BLD-MCNC: 8.8 G/DL (ref 12–16)
HOLD SPECIMEN: NORMAL
HOLD SPECIMEN: NORMAL
IMM GRANULOCYTES # BLD AUTO: 0.05 X10(3)/MCL (ref 0–0.04)
IMM GRANULOCYTES NFR BLD AUTO: 0.5 %
LYMPHOCYTES # BLD AUTO: 2.73 X10(3)/MCL (ref 0.6–4.6)
LYMPHOCYTES NFR BLD AUTO: 25.1 %
MCH RBC QN AUTO: 24.2 PG (ref 27–31)
MCHC RBC AUTO-ENTMCNC: 30.9 G/DL (ref 33–36)
MCV RBC AUTO: 78.5 FL (ref 80–94)
MONOCYTES # BLD AUTO: 0.75 X10(3)/MCL (ref 0.1–1.3)
MONOCYTES NFR BLD AUTO: 6.9 %
NEUTROPHILS # BLD AUTO: 7.24 X10(3)/MCL (ref 2.1–9.2)
NEUTROPHILS NFR BLD AUTO: 66.5 %
NRBC BLD AUTO-RTO: 0 %
PLATELET # BLD AUTO: 263 X10(3)/MCL (ref 130–400)
PMV BLD AUTO: 9.8 FL (ref 7.4–10.4)
POCT GLUCOSE: 189 MG/DL (ref 70–110)
POCT GLUCOSE: 215 MG/DL (ref 70–110)
POCT GLUCOSE: 219 MG/DL (ref 70–110)
POCT GLUCOSE: 78 MG/DL (ref 70–110)
POTASSIUM SERPL-SCNC: 3.7 MMOL/L (ref 3.5–5.1)
PROT SERPL-MCNC: 6.4 GM/DL (ref 5.8–7.6)
RBC # BLD AUTO: 3.63 X10(6)/MCL (ref 4.2–5.4)
SODIUM SERPL-SCNC: 138 MMOL/L (ref 136–145)
TROPONIN I SERPL-MCNC: <0.01 NG/ML (ref 0–0.04)
WBC # BLD AUTO: 10.87 X10(3)/MCL (ref 4.5–11.5)

## 2025-01-22 PROCEDURE — 25000003 PHARM REV CODE 250

## 2025-01-22 PROCEDURE — 80053 COMPREHEN METABOLIC PANEL: CPT

## 2025-01-22 PROCEDURE — 63600175 PHARM REV CODE 636 W HCPCS

## 2025-01-22 PROCEDURE — 36415 COLL VENOUS BLD VENIPUNCTURE: CPT | Performed by: INTERNAL MEDICINE

## 2025-01-22 PROCEDURE — 11000001 HC ACUTE MED/SURG PRIVATE ROOM

## 2025-01-22 PROCEDURE — 36415 COLL VENOUS BLD VENIPUNCTURE: CPT

## 2025-01-22 PROCEDURE — 85025 COMPLETE CBC W/AUTO DIFF WBC: CPT

## 2025-01-22 PROCEDURE — 84484 ASSAY OF TROPONIN QUANT: CPT

## 2025-01-22 RX ADMIN — KETOROLAC TROMETHAMINE 15 MG: 30 INJECTION, SOLUTION INTRAMUSCULAR; INTRAVENOUS at 04:01

## 2025-01-22 RX ADMIN — INSULIN ASPART 2 UNITS: 100 INJECTION, SOLUTION INTRAVENOUS; SUBCUTANEOUS at 05:01

## 2025-01-22 RX ADMIN — ALUMINUM HYDROXIDE, MAGNESIUM HYDROXIDE, AND DIMETHICONE 30 ML: 200; 20; 200 SUSPENSION ORAL at 04:01

## 2025-01-22 RX ADMIN — ENOXAPARIN SODIUM 40 MG: 40 INJECTION SUBCUTANEOUS at 04:01

## 2025-01-22 RX ADMIN — KETOROLAC TROMETHAMINE 15 MG: 30 INJECTION, SOLUTION INTRAMUSCULAR; INTRAVENOUS at 08:01

## 2025-01-22 RX ADMIN — MEMANTINE HYDROCHLORIDE 10 MG: 5 TABLET ORAL at 08:01

## 2025-01-22 RX ADMIN — INSULIN ASPART 1 UNITS: 100 INJECTION, SOLUTION INTRAVENOUS; SUBCUTANEOUS at 09:01

## 2025-01-22 RX ADMIN — ATORVASTATIN CALCIUM 20 MG: 20 TABLET, FILM COATED ORAL at 08:01

## 2025-01-22 RX ADMIN — ALUMINUM HYDROXIDE, MAGNESIUM HYDROXIDE, AND DIMETHICONE 30 ML: 200; 20; 200 SUSPENSION ORAL at 09:01

## 2025-01-22 RX ADMIN — INSULIN GLARGINE 10 UNITS: 100 INJECTION, SOLUTION SUBCUTANEOUS at 09:01

## 2025-01-22 RX ADMIN — KETOROLAC TROMETHAMINE 15 MG: 30 INJECTION, SOLUTION INTRAMUSCULAR; INTRAVENOUS at 11:01

## 2025-01-22 RX ADMIN — LEVOTHYROXINE SODIUM 100 MCG: 0.1 TABLET ORAL at 06:01

## 2025-01-22 RX ADMIN — LISINOPRIL 20 MG: 10 TABLET ORAL at 08:01

## 2025-01-22 RX ADMIN — ACETAMINOPHEN 650 MG: 325 TABLET, FILM COATED ORAL at 06:01

## 2025-01-22 RX ADMIN — MEMANTINE HYDROCHLORIDE 10 MG: 5 TABLET ORAL at 09:01

## 2025-01-22 RX ADMIN — ALUMINUM HYDROXIDE, MAGNESIUM HYDROXIDE, AND DIMETHICONE 30 ML: 200; 20; 200 SUSPENSION ORAL at 10:01

## 2025-01-22 RX ADMIN — SODIUM CHLORIDE 125 MG: 9 INJECTION, SOLUTION INTRAVENOUS at 10:01

## 2025-01-22 RX ADMIN — ALUMINUM HYDROXIDE, MAGNESIUM HYDROXIDE, AND DIMETHICONE 30 ML: 200; 20; 200 SUSPENSION ORAL at 06:01

## 2025-01-22 NOTE — PLAN OF CARE
Problem: Adult Inpatient Plan of Care  Goal: Plan of Care Review  Reactivated  Goal: Patient-Specific Goal (Individualized)  Reactivated  Goal: Absence of Hospital-Acquired Illness or Injury  Reactivated  Goal: Optimal Comfort and Wellbeing  Reactivated  Goal: Readiness for Transition of Care  Reactivated     Problem: Pain Acute  Goal: Optimal Pain Control and Function  Reactivated     Problem: Orthopaedic Fracture  Goal: Absence of Bleeding  Reactivated  Goal: Bowel Elimination  Reactivated  Goal: Absence of Embolism Signs and Symptoms  Reactivated  Goal: Fracture Stability  Reactivated  Goal: Optimal Functional Ability  Reactivated  Goal: Absence of Infection Signs and Symptoms  Reactivated  Goal: Effective Tissue Perfusion  Reactivated  Goal: Optimal Pain Control and Function  Reactivated  Goal: Effective Oxygenation and Ventilation  Reactivated     Problem: Fall Injury Risk  Goal: Absence of Fall and Fall-Related Injury  Reactivated

## 2025-01-22 NOTE — PROGRESS NOTES
[facility] History and Physical     Patient Name: Brittany Nunn  MRN: [id number]  Admission Date: 1/21/2025  Hospital Length of Stay: 1 days  Code Status: Full Code  Attending Provider: Neil Mendoza MD  Primary Care Provider: Ester, Primary Doctor     Chief Complaint:   Fall, Loss of Consciousness, and Shoulder Pain (Presents via EMS. Sustained a ground level fall at home after trip and slip (states she tripped over a shoe; did not hit head, no LOC) and called EMS. Upon arrival EMS witnessed pt trip and fall a second time (again a trip over an obstacle while walking, no LOC). EMS assisted up to a chair when pt had sudden bradycardia and hypotension causing syncope (did not hit head).)      Subjective:      Brief HPI:  Brittany Nunn is a 89 y.o. female with past medical history of CVA/TIA, Alzheimer's, hyperlipidemia, GERD, hypothyroidism, hypertension, ovarian cancer s/p resection (refused chemotherapy after 1 session following procedure) and diabetes on insulin.  She presented to [facility] on 1/21/2025  with a primary complaint of fall on right shoulder.  Patient reports that she was distracted and slipped over her shoe and fell on her right shoulder.  She reported pain in that shoulder prior to the fall that had been going on for weeks.  She was unable to get up off the ground until EMS arrived and helped her up.  Patient's history deviates from this point from EMS accounts, likely secondary to history of Alzheimer's.  EMS reported that when patient was picked up off the ground she became hypotensive and bradycardic, she felt nauseated and subsequently vomited 1 time.  Following this episode patient loss consciousness for a few moments.  Patient and EMS agree there was no trauma to her head.  Patient lives at home with 91-year-old  and AIDS and taking care of him.  She also lives with her daughter-in-law who has a complicated drug his in his unable to confidently provide her with  support necessary to take care of her following this trauma. Patient reports pain in her right shoulder.  Patient denies weakness, dizziness, and loss of consciousness.  Patient denies any tobacco, recreational drug use, or alcohol history.    ED course:  Patient presented afebrile, vital signs stable, saturating 97% on room air.  CBC significant for leukocytosis WBC 14.81, microcytic anemia H/H 9.8/31.6.  CMP significant for BUN/creatinine 23.9/0.91, otherwise unremarkable.  CT head without contrast showed Periventricular and subcortical white matter changes and cerebral atrophy most compatible with chronic small vessel ischemic disease.  X-ray and CT arm of the right shoulder shows impacted proximal humerus fracture.  ED physician consulted Orthopedic surgery, determined patient requires no surgical intervention at this time.  She will likely need to follow up 10 days following discharge with Orthopedic surgery.  Internal medicine was consulted for orthostatic hypotension and right shoulder fracture.    Interval history:  No acute events overnight.  Vital signs stable.  H/H 8.8/28.5, leukocytosis resolved.  CMP stable.  Patient planning SNF placement.    Patient family history is not on file.       Patient  has no past surgical history on file.    Patient is allergic to cefdinir.    Patient  reports that she has never smoked. She has never been exposed to tobacco smoke. She has never used smokeless tobacco. She reports that she does not drink alcohol and does not use drugs.     No current outpatient medications       Review of Systems:  The remainder of the 14 point ROS is noncontributory or negative unless mentioned/reviewed above.     Objective:     Vital Signs:  Vital Signs (Most Recent):  Temp: 97.9 °F (36.6 °C) (01/22/25 0754)  Pulse: 96 (01/22/25 0754)  Resp: 18 (01/22/25 0754)  BP: 126/70 (01/22/25 0754)  SpO2: 98 % (01/22/25 0754)  Body mass index is 25.56 kg/m².  Weight: 70.8 kg (156 lb) Vital Signs (24h  Range):  Temp:  [97 °F (36.1 °C)-98.6 °F (37 °C)] 97.9 °F (36.6 °C)  Pulse:  [] 96  Resp:  [17-20] 18  SpO2:  [95 %-98 %] 98 %  BP: (110-145)/(68-84) 126/70       Input/output:     Intake/Output Summary (Last 24 hours) at 1/22/2025 0756  Last data filed at 1/22/2025 0526  Gross per 24 hour   Intake 2464.64 ml   Output 300 ml   Net 2164.64 ml       Physical Exam  Constitutional:       General: She is not in acute distress.     Appearance: Normal appearance. She is not ill-appearing.   HENT:      Head: Normocephalic and atraumatic.      Mouth/Throat:      Mouth: Mucous membranes are dry.      Pharynx: Oropharynx is clear.   Eyes:      Extraocular Movements: Extraocular movements intact.      Conjunctiva/sclera: Conjunctivae normal.   Cardiovascular:      Rate and Rhythm: Normal rate and regular rhythm.      Pulses: Normal pulses.      Heart sounds: Normal heart sounds. No murmur heard.     No friction rub. No gallop.   Pulmonary:      Effort: Pulmonary effort is normal. No respiratory distress.      Breath sounds: Normal breath sounds. No stridor. No wheezing, rhonchi or rales.   Chest:      Chest wall: No tenderness.   Abdominal:      General: Abdomen is flat. Bowel sounds are normal. There is no distension.      Palpations: Abdomen is soft. There is no mass.      Tenderness: There is no abdominal tenderness. There is no guarding or rebound.      Hernia: No hernia is present.   Musculoskeletal:         General: Signs of injury (Right shoulder) present. No swelling.      Cervical back: Normal range of motion.   Skin:     General: Skin is warm and dry.      Capillary Refill: Capillary refill takes less than 2 seconds.      Coloration: Skin is pale.   Neurological:      General: No focal deficit present.      Mental Status: She is alert and oriented to person, place, and time.   Psychiatric:         Mood and Affect: Mood normal.         Behavior: Behavior normal.          Lines/Drains/Airways       None         "            Laboratory:    Recent Labs   Lab 01/22/25  0333   WBC 10.87   HGB 8.8*   HCT 28.5*      MCV 78.5*   RDW 15.4     No results for input(s): "TROPONINI", "CKTOTAL", "CKMB", "BNP" in the last 24 hours.  No results for input(s): "TROPONINI", "CKTOTAL", "CKMB", "BNP" in the last 168 hours.  No results for input(s): "CHOL", "HDL", "LDLCALC", "TRIG", "CHOLHDL" in the last 168 hours. Recent Labs   Lab 01/21/25  1541 01/22/25  0333    138   K 4.2 3.7   CO2 23 26   BUN 23.9* 18.3   CREATININE 0.91 0.72   CALCIUM 9.9 9.4   MG 1.90  --      Recent Labs   Lab 01/22/25  0333   ALBUMIN 3.4   BILITOT 0.7   AST 18   ALKPHOS 53   ALT 11     Recent Labs   Lab 01/21/25  1546   IRON 26*   TIBC 345   FERRITIN 10.11     Recent Labs   Lab 01/21/25  1546   TSH 1.267          Other Results:  Estimated Creatinine Clearance: 54.9 mL/min (based on SCr of 0.72 mg/dL).    Current Medications:     Infusions:          Scheduled:   aluminum-magnesium hydroxide-simethicone  30 mL Oral QID (AC & HS)    atorvastatin  20 mg Oral Daily    enoxparin  40 mg Subcutaneous Daily    insulin glargine U-100  10 Units Subcutaneous QHS    levothyroxine  100 mcg Oral Before breakfast    lisinopriL  20 mg Oral Daily    memantine  10 mg Oral BID         PRN:   aluminum-magnesium hydroxide-simethicone 200-200-20 mg/5 mL suspension 30 mL    atorvastatin tablet 20 mg    enoxaparin injection 40 mg    insulin glargine U-100 (Lantus) injection 10 Units    levothyroxine tablet 100 mcg    lisinopriL tablet 20 mg    memantine tablet 10 mg        Microbiology Data:  Microbiology Results (last 7 days)       ** No results found for the last 168 hours. **             Antibiotics and Day Number of Therapy:  Antibiotics (From admission, onward)      None             Imaging:  CT Arm (Humerus) Without Contrast Right  Narrative: EXAMINATION:  CT ARM (HUMERUS) WITHOUT CONTRAST RIGHT    CLINICAL HISTORY:  arm fx;    TECHNIQUE:  Helically acquired imaging through " the right shoulder were obtained without the IV administration of contrast. Axial, sagittal and coronal reformations were created and interpreted.    Automated tube current modulation, weight-based exposure dosing, and/or iterative reconstruction technique utilized to reach lowest reasonably achievable exposure rate.    DLP: 332 mGy*cm    COMPARISON:  Plain radiograph right shoulder 01/21/2025    FINDINGS:  BONES/JOINTS: Comminuted, impacted fracture of the humeral head/neck and greater tuberosity.  There is a shoulder joint effusion with mild subluxation.  Degenerative change at the acromioclavicular joint.  Subtle cortical contour deformity at right 3rd and 4th ribs anterolaterally.    SOFT TISSUES: Soft tissue swelling.    OTHER: N/A  Impression: Impacted fracture of the proximal humerus    Electronically signed by: Yaneli Lemus  Date:    01/21/2025  Time:    15:19  CT Head Without Contrast  Narrative: EXAMINATION:  CT HEAD WITHOUT CONTRAST    CLINICAL HISTORY:  fall, old;    TECHNIQUE:  Low dose axial CT images obtained throughout the head without intravenous contrast.  Axial, sagittal and coronal reconstructions were performed and interpreted.    DLP: 862 mGycm    All CT scans at this location are performed using dose optimization techniques as appropriate to a performed exam including the following automated exposure control, adjustment of the mA and/or kV according to patient size and/or use of iterative reconstruction technique    COMPARISON:  No relevant prior available for comparison.    FINDINGS:  BRAIN: Gray white differentiation is maintained. Periventricular and subcortical white matter changes most compatible with chronic small vessel ischemic disease.  Moderate cerebral atrophy.  No hemorrhage. No edema. No mass effect or midline shift.  The posterior fossa and midline structures are unremarkable.    VENTRICLES: Ex vacuo dilatation of the ventricles.    EXTRA-AXIAL: No abnormal extra-axial  "collections.    BONES: Calvarium is intact.    SINUSES AND MASTOIDS: Mucoperiosteal thickening at the right maxillary sinus.  Impression: No appreciable acute intracranial abnormality.    Periventricular and subcortical white matter changes and cerebral atrophy most compatible with chronic small vessel ischemic disease.    Electronically signed by: Yaneli Lemus  Date:    01/21/2025  Time:    15:06  X-Ray Shoulder Complete 2 View Right  Narrative: EXAMINATION:  XR SHOULDER COMPLETE 2 OR MORE VIEWS RIGHT    CLINICAL HISTORY:  Unspecified fall, initial encounter    TECHNIQUE:  Three views of the right shoulder were performed.    COMPARISON  None    FINDINGS:  BONES: Impacted fracture at the humeral head/neck with involvement of the greater tuberosity.  Inferior subluxation of the humeral head.    SOFT TISSUES:  Regional soft tissues are normal.  Impression: Proximal humerus fracture.    Electronically signed by: Yaneli Lemus  Date:    01/21/2025  Time:    14:49        2D ECHO Results    No results found for this or any previous visit.        Pulmonary Functions Testing Results:    No results found for: "FEV1", "FVC", "TPD5FVA", "TLC", "DLCO"    Assessment & Plan:     Orthostatic hypotension vs Vasovagal syncope  Dehydration  -EMS reported drop in blood pressure and heart rate prior to loss of consciousness  -patient received 1 L LR in the ED in 1 L on the floor.  -repeat orthostatics  -troponin negative    Microcytic anemia  Iron-deficiency anemia  -patient has prior history of iron infusions in 2015, reports she only did this for a short period of time.  Reports she is unsure what caused her iron to be low at that time.  -iron studies show low iron 26, low iron saturation 8, normal total iron binding capacity, normal ferritin.  -replete with Ferrlecit 1 time    Alzheimer's vs vascular dementia  -CT head without contrast showed Periventricular and subcortical white matter changes and cerebral atrophy most " compatible with chronic small vessel ischemic disease.  -continue home dose memantine 10 mg b.i.d.    Diabetes  -holding home meds  -started on Lantus 10 units q.h.s. and low-dose sliding scale    Right shoulder fracture  -X-ray and CT arm of the right shoulder shows impacted proximal humerus fracture  -E consult to Orthopedic surgery, recommended no surgical intervention at this time.  Follow up in 10 days following discharge.  -p.r.n. pain meds     Hypothyroidism  -continue home dose levothyroxine 100 mcg q.d.  -TSH and free T4 WNL    Hypertension  Hyperlipidemia  -continue home meds:  Atorvastatin 20 mg q.d., lisinopril 20 mg q.d.        CODE STATUS: Full Code   Access: PIV  Antibiotics: -  Diet: Diet diabetic 2000 Calories (up to 75 gm per meal)  DVT Prophylaxis: Lovenox   GI Prophylaxis: famotidine/ carafate  Fluids:  -      Disposition: Brittany Nunn is a 89 y.o. female who is admitted for orthostatic hypotension with right proximal humerus fracture.  Discharge pending SNF placement.      Shima Kumar DO  [facility] Internal Medicine, PGY-1  01/22/2025

## 2025-01-22 NOTE — CONSULTS
Consult for SNF noted. Spoke to patient's daughter, Shabnam Mcclendon, P: [phone], who is in agreement. First choice is [facility]. Referral has been sent via Epic. Will follow.

## 2025-01-22 NOTE — PLAN OF CARE
Received call from patient's daughter, Shabnam Patel, P: [phone], expressing concern that patient has not been physically evaluated by an orthopedist and that patient had pain in her shoulder prior to fall. Spoke to primary MD team to relay concerns and request that they call her.

## 2025-01-22 NOTE — PLAN OF CARE
01/22/25 1005   Discharge Assessment   Assessment Type Discharge Planning Assessment   Confirmed/corrected address, phone number and insurance Yes   Confirmed Demographics Correct on Facesheet   Source of Information family   When was your last doctors appointment?   (PCP: Terrence Galvez)   Does patient/caregiver understand observation status   (Inpatient)   Communicated ISATU with patient/caregiver Date not available/Unable to determine   Reason For Admission I95.1 Orthostatic hypotension  W19.XXXA Fall  R07.9 Chest pain  S42.352A Closed displaced comminuted fracture of shaft of left humerus, initial encounter  W19.XXXA Fall from standing, initial encounter  R55 Syncope, unspecified syncope type   People in Home spouse;grandchild(consuelo);other relative(s)  (DIL)   Facility Arrived From: Home   Do you expect to return to your current living situation? Yes   Do you have help at home or someone to help you manage your care at home? No   Prior to hospitilization cognitive status: Unable to Assess   Current cognitive status: Unable to Assess   Walking or Climbing Stairs Difficulty yes   Mobility Management Rollator   Dressing/Bathing Difficulty no   Home Layout Able to live on 1st floor   Equipment Currently Used at Home blood pressure machine;rollator;shower chair;grab bar;raised toilet;wheelchair  (Has access to equipment but does not use all)   Readmission within 30 days? No   Patient currently being followed by outpatient case management? No   Do you currently have service(s) that help you manage your care at home? No   Do you take prescription medications? Yes   Do you have prescription coverage? Yes   Coverage [employer] Managed Medicare PPO   Do you have any problems affording any of your prescribed medications? No   Is the patient taking medications as prescribed? yes   Who is going to help you get home at discharge? Family   How do you get to doctors appointments? family or friend will provide   Are you on dialysis?  "No   Do you take coumadin? No   Discharge Plan A Skilled Nursing Facility   Discharge Plan B Home Health;Home with family   DME Needed Upon Discharge  other (see comments)  (TBD)   Discharge Plan discussed with: Adult children   Transition of Care Barriers No family/friends to help   Physical Activity   On average, how many days per week do you engage in moderate to strenuous exercise (like a brisk walk)? 0 days   On average, how many minutes do you engage in exercise at this level? 0 min   Financial Resource Strain   How hard is it for you to pay for the very basics like food, housing, medical care, and heating? Not hard   Housing Stability   In the last 12 months, was there a time when you were not able to pay the mortgage or rent on time? N   At any time in the past 12 months, were you homeless or living in a shelter (including now)? N   Transportation Needs   Has the lack of transportation kept you from medical appointments, meetings, work or from getting things needed for daily living? No   Food Insecurity   Within the past 12 months, you worried that your food would run out before you got the money to buy more. Never true   Within the past 12 months, the food you bought just didn't last and you didn't have money to get more. Never true   Utilities   In the past 12 months has the electric, gas, oil, or water company threatened to shut off services in your home? No   OTHER   Name(s) of People in Home Michael "Frederick" Arline, , P: [phone] (often responds better to email due to difficulty speaking: [email or web address]); DHIRAJ Vidales; Jordon Nunn, grandkatty     [place] assessment information obtained from patient's daughter, Shabnam Tate, P: [phone], who lives in [place]; patient's , Frederick, was diagnosed with ALS and unable to assist patient; Sally HEARN, suffers from mental illness and is not a reliable caregiver; grandson, Jordon, basically stays in his room playing video " games and does not provide much assistance; son, Edis Nunn, P: [phone], lives in [place] and works offshore, goes to parents' home approximately once/month to clean and fix things; Edis's wife, Jaimie, P: [phone], checks in on them and visits approximately every 3 months; granddaughter, Antonieta Nunn, P: [phone], lives and works full-time in [place], provides transportation to appointments and is of most assistance when she's able; CM will follow for DC planning needs.

## 2025-01-23 LAB
ALBUMIN SERPL-MCNC: 3.2 G/DL (ref 3.4–4.8)
ALBUMIN/GLOB SERPL: 1 RATIO (ref 1.1–2)
ALP SERPL-CCNC: 59 UNIT/L (ref 40–150)
ALT SERPL-CCNC: 10 UNIT/L (ref 0–55)
ANION GAP SERPL CALC-SCNC: 9 MEQ/L
AST SERPL-CCNC: 17 UNIT/L (ref 5–34)
BASOPHILS # BLD AUTO: 0.07 X10(3)/MCL
BASOPHILS NFR BLD AUTO: 0.8 %
BILIRUB SERPL-MCNC: 0.4 MG/DL
BUN SERPL-MCNC: 16.7 MG/DL (ref 9.8–20.1)
CALCIUM SERPL-MCNC: 9.3 MG/DL (ref 8.4–10.2)
CHLORIDE SERPL-SCNC: 106 MMOL/L (ref 98–107)
CO2 SERPL-SCNC: 27 MMOL/L (ref 23–31)
CREAT SERPL-MCNC: 0.74 MG/DL (ref 0.55–1.02)
CREAT/UREA NIT SERPL: 23
EOSINOPHIL # BLD AUTO: 0.17 X10(3)/MCL (ref 0–0.9)
EOSINOPHIL NFR BLD AUTO: 2 %
ERYTHROCYTE [DISTWIDTH] IN BLOOD BY AUTOMATED COUNT: 15.8 % (ref 11.5–17)
GFR SERPLBLD CREATININE-BSD FMLA CKD-EPI: >60 ML/MIN/1.73/M2
GLOBULIN SER-MCNC: 3.2 GM/DL (ref 2.4–3.5)
GLUCOSE SERPL-MCNC: 105 MG/DL (ref 82–115)
HCT VFR BLD AUTO: 28.7 % (ref 37–47)
HGB BLD-MCNC: 8.7 G/DL (ref 12–16)
IMM GRANULOCYTES # BLD AUTO: 0.03 X10(3)/MCL (ref 0–0.04)
IMM GRANULOCYTES NFR BLD AUTO: 0.4 %
LYMPHOCYTES # BLD AUTO: 2.86 X10(3)/MCL (ref 0.6–4.6)
LYMPHOCYTES NFR BLD AUTO: 34.4 %
MCH RBC QN AUTO: 24.1 PG (ref 27–31)
MCHC RBC AUTO-ENTMCNC: 30.3 G/DL (ref 33–36)
MCV RBC AUTO: 79.5 FL (ref 80–94)
MONOCYTES # BLD AUTO: 0.69 X10(3)/MCL (ref 0.1–1.3)
MONOCYTES NFR BLD AUTO: 8.3 %
NEUTROPHILS # BLD AUTO: 4.5 X10(3)/MCL (ref 2.1–9.2)
NEUTROPHILS NFR BLD AUTO: 54.1 %
NRBC BLD AUTO-RTO: 0 %
PLATELET # BLD AUTO: 230 X10(3)/MCL (ref 130–400)
PMV BLD AUTO: 9.8 FL (ref 7.4–10.4)
POCT GLUCOSE: 117 MG/DL (ref 70–110)
POCT GLUCOSE: 174 MG/DL (ref 70–110)
POCT GLUCOSE: 199 MG/DL (ref 70–110)
POCT GLUCOSE: 361 MG/DL (ref 70–110)
POTASSIUM SERPL-SCNC: 3.9 MMOL/L (ref 3.5–5.1)
PROT SERPL-MCNC: 6.4 GM/DL (ref 5.8–7.6)
RBC # BLD AUTO: 3.61 X10(6)/MCL (ref 4.2–5.4)
SODIUM SERPL-SCNC: 142 MMOL/L (ref 136–145)
WBC # BLD AUTO: 8.32 X10(3)/MCL (ref 4.5–11.5)

## 2025-01-23 PROCEDURE — 85025 COMPLETE CBC W/AUTO DIFF WBC: CPT

## 2025-01-23 PROCEDURE — 36415 COLL VENOUS BLD VENIPUNCTURE: CPT

## 2025-01-23 PROCEDURE — 25000003 PHARM REV CODE 250

## 2025-01-23 PROCEDURE — 11000001 HC ACUTE MED/SURG PRIVATE ROOM

## 2025-01-23 PROCEDURE — 63600175 PHARM REV CODE 636 W HCPCS

## 2025-01-23 PROCEDURE — 80053 COMPREHEN METABOLIC PANEL: CPT

## 2025-01-23 RX ADMIN — KETOROLAC TROMETHAMINE 15 MG: 30 INJECTION, SOLUTION INTRAMUSCULAR; INTRAVENOUS at 04:01

## 2025-01-23 RX ADMIN — INSULIN GLARGINE 10 UNITS: 100 INJECTION, SOLUTION SUBCUTANEOUS at 08:01

## 2025-01-23 RX ADMIN — KETOROLAC TROMETHAMINE 15 MG: 30 INJECTION, SOLUTION INTRAMUSCULAR; INTRAVENOUS at 05:01

## 2025-01-23 RX ADMIN — LISINOPRIL 20 MG: 10 TABLET ORAL at 08:01

## 2025-01-23 RX ADMIN — MEMANTINE HYDROCHLORIDE 10 MG: 5 TABLET ORAL at 08:01

## 2025-01-23 RX ADMIN — ALUMINUM HYDROXIDE, MAGNESIUM HYDROXIDE, AND DIMETHICONE 30 ML: 200; 20; 200 SUSPENSION ORAL at 12:01

## 2025-01-23 RX ADMIN — INSULIN ASPART 3 UNITS: 100 INJECTION, SOLUTION INTRAVENOUS; SUBCUTANEOUS at 08:01

## 2025-01-23 RX ADMIN — SODIUM CHLORIDE 125 MG: 9 INJECTION, SOLUTION INTRAVENOUS at 03:01

## 2025-01-23 RX ADMIN — LEVOTHYROXINE SODIUM 100 MCG: 0.1 TABLET ORAL at 05:01

## 2025-01-23 RX ADMIN — ATORVASTATIN CALCIUM 20 MG: 20 TABLET, FILM COATED ORAL at 08:01

## 2025-01-23 RX ADMIN — ALUMINUM HYDROXIDE, MAGNESIUM HYDROXIDE, AND DIMETHICONE 30 ML: 200; 20; 200 SUSPENSION ORAL at 05:01

## 2025-01-23 RX ADMIN — ALUMINUM HYDROXIDE, MAGNESIUM HYDROXIDE, AND DIMETHICONE 30 ML: 200; 20; 200 SUSPENSION ORAL at 04:01

## 2025-01-23 RX ADMIN — ENOXAPARIN SODIUM 40 MG: 40 INJECTION SUBCUTANEOUS at 04:01

## 2025-01-23 RX ADMIN — KETOROLAC TROMETHAMINE 15 MG: 30 INJECTION, SOLUTION INTRAMUSCULAR; INTRAVENOUS at 11:01

## 2025-01-23 RX ADMIN — ALUMINUM HYDROXIDE, MAGNESIUM HYDROXIDE, AND DIMETHICONE 30 ML: 200; 20; 200 SUSPENSION ORAL at 08:01

## 2025-01-23 NOTE — PROGRESS NOTES
[facility] History and Physical     Patient Name: Brittany Nunn  MRN: [id number]  Admission Date: 1/21/2025  Hospital Length of Stay: 2 days  Code Status: Full Code  Attending Provider: Neil Mendoza MD  Primary Care Provider: Ester, Primary Doctor     Chief Complaint:   Fall, Loss of Consciousness, and Shoulder Pain (Presents via EMS. Sustained a ground level fall at home after trip and slip (states she tripped over a shoe; did not hit head, no LOC) and called EMS. Upon arrival EMS witnessed pt trip and fall a second time (again a trip over an obstacle while walking, no LOC). EMS assisted up to a chair when pt had sudden bradycardia and hypotension causing syncope (did not hit head).)      Subjective:      Brief HPI:  Brittany Nunn is a 89 y.o. female with past medical history of CVA/TIA, Alzheimer's, hyperlipidemia, GERD, hypothyroidism, hypertension, ovarian cancer s/p resection (refused chemotherapy after 1 session following procedure) and diabetes on insulin.  She presented to [facility] on 1/21/2025  with a primary complaint of fall on right shoulder.  Patient reports that she was distracted and slipped over her shoe and fell on her right shoulder.  She reported pain in that shoulder prior to the fall that had been going on for weeks.  She was unable to get up off the ground until EMS arrived and helped her up.  Patient's history deviates from this point from EMS accounts, likely secondary to history of Alzheimer's.  EMS reported that when patient was picked up off the ground she became hypotensive and bradycardic, she felt nauseated and subsequently vomited 1 time.  Following this episode patient loss consciousness for a few moments.  Patient and EMS agree there was no trauma to her head.  Patient lives at home with 91-year-old  and AIDS and taking care of him.  She also lives with her daughter-in-law who has a complicated drug his in his unable to confidently provide her with  support necessary to take care of her following this trauma. Patient reports pain in her right shoulder.  Patient denies weakness, dizziness, and loss of consciousness.  Patient denies any tobacco, recreational drug use, or alcohol history.    ED course:  Patient presented afebrile, vital signs stable, saturating 97% on room air.  CBC significant for leukocytosis WBC 14.81, microcytic anemia H/H 9.8/31.6.  CMP significant for BUN/creatinine 23.9/0.91, otherwise unremarkable.  CT head without contrast showed Periventricular and subcortical white matter changes and cerebral atrophy most compatible with chronic small vessel ischemic disease.  X-ray and CT arm of the right shoulder shows impacted proximal humerus fracture.  ED physician consulted Orthopedic surgery, determined patient requires no surgical intervention at this time.  She will likely need to follow up 10 days following discharge with Orthopedic surgery.  Internal medicine was consulted for orthostatic hypotension and right shoulder fracture.    Interval history:  No acute events overnight.  Vital signs stable.  H/H 8.7/28.7, leukocytosis resolved.  CMP stable.  Patient planning SNF/Rehab placement.    Patient family history is not on file.       Patient  has no past surgical history on file.    Patient is allergic to cefdinir.    Patient  reports that she has never smoked. She has never been exposed to tobacco smoke. She has never used smokeless tobacco. She reports that she does not drink alcohol and does not use drugs.     No current outpatient medications       Review of Systems:  The remainder of the 14 point ROS is noncontributory or negative unless mentioned/reviewed above.     Objective:     Vital Signs:  Vital Signs (Most Recent):  Temp: 97.2 °F (36.2 °C) (01/23/25 1216)  Pulse: 95 (01/23/25 1216)  Resp: 18 (01/23/25 1216)  BP: 118/70 (01/23/25 1216)  SpO2: 99 % (01/23/25 1216)  Body mass index is 25.56 kg/m².  Weight: 70.8 kg (156 lb) Vital Signs  (24h Range):  Temp:  [97.2 °F (36.2 °C)-98.5 °F (36.9 °C)] 97.2 °F (36.2 °C)  Pulse:  [] 95  Resp:  [17-18] 18  SpO2:  [95 %-99 %] 99 %  BP: (118-155)/(68-86) 118/70       Input/output:     Intake/Output Summary (Last 24 hours) at 1/23/2025 1350  Last data filed at 1/23/2025 0900  Gross per 24 hour   Intake 340 ml   Output 600 ml   Net -260 ml       Physical Exam  Constitutional:       General: She is not in acute distress.     Appearance: Normal appearance. She is not ill-appearing.   HENT:      Head: Normocephalic and atraumatic.      Mouth/Throat:      Mouth: Mucous membranes are dry.      Pharynx: Oropharynx is clear.   Eyes:      Extraocular Movements: Extraocular movements intact.      Conjunctiva/sclera: Conjunctivae normal.   Cardiovascular:      Rate and Rhythm: Normal rate and regular rhythm.      Pulses: Normal pulses.      Heart sounds: Normal heart sounds. No murmur heard.     No friction rub. No gallop.   Pulmonary:      Effort: Pulmonary effort is normal. No respiratory distress.      Breath sounds: Normal breath sounds. No stridor. No wheezing, rhonchi or rales.   Chest:      Chest wall: No tenderness.   Abdominal:      General: Abdomen is flat. Bowel sounds are normal. There is no distension.      Palpations: Abdomen is soft. There is no mass.      Tenderness: There is no abdominal tenderness. There is no guarding or rebound.      Hernia: No hernia is present.   Musculoskeletal:         General: Signs of injury (Right shoulder) present. No swelling.      Cervical back: Normal range of motion.   Skin:     General: Skin is warm and dry.      Capillary Refill: Capillary refill takes less than 2 seconds.      Coloration: Skin is pale.   Neurological:      General: No focal deficit present.      Mental Status: She is alert and oriented to person, place, and time.   Psychiatric:         Mood and Affect: Mood normal.         Behavior: Behavior normal.          Lines/Drains/Airways       None         "            Laboratory:    Recent Labs   Lab 01/23/25  0313   WBC 8.32   HGB 8.7*   HCT 28.7*      MCV 79.5*   RDW 15.8     No results for input(s): "TROPONINI", "CKTOTAL", "CKMB", "BNP" in the last 24 hours.  Recent Labs   Lab 01/22/25  1025   TROPONINI <0.010     No results for input(s): "CHOL", "HDL", "LDLCALC", "TRIG", "CHOLHDL" in the last 168 hours. Recent Labs   Lab 01/23/25  0313      K 3.9   CO2 27   BUN 16.7   CREATININE 0.74   CALCIUM 9.3     Recent Labs   Lab 01/23/25  0313   ALBUMIN 3.2*   BILITOT 0.4   AST 17   ALKPHOS 59   ALT 10     Recent Labs   Lab 01/21/25  1546   IRON 26*   TIBC 345   FERRITIN 10.11     Recent Labs   Lab 01/21/25  1546   TSH 1.267          Other Results:  Estimated Creatinine Clearance: 53.4 mL/min (based on SCr of 0.74 mg/dL).    Current Medications:     Infusions:          Scheduled:   aluminum-magnesium hydroxide-simethicone  30 mL Oral QID (AC & HS)    atorvastatin  20 mg Oral Daily    enoxparin  40 mg Subcutaneous Daily    insulin glargine U-100  10 Units Subcutaneous QHS    levothyroxine  100 mcg Oral Before breakfast    lisinopriL  20 mg Oral Daily    memantine  10 mg Oral BID         PRN:   aluminum-magnesium hydroxide-simethicone 200-200-20 mg/5 mL suspension 30 mL    atorvastatin tablet 20 mg    enoxaparin injection 40 mg    insulin glargine U-100 (Lantus) injection 10 Units    levothyroxine tablet 100 mcg    lisinopriL tablet 20 mg    memantine tablet 10 mg        Microbiology Data:  Microbiology Results (last 7 days)       ** No results found for the last 168 hours. **             Antibiotics and Day Number of Therapy:  Antibiotics (From admission, onward)      None             Imaging:  CT Arm (Humerus) Without Contrast Right  Narrative: EXAMINATION:  CT ARM (HUMERUS) WITHOUT CONTRAST RIGHT    CLINICAL HISTORY:  arm fx;    TECHNIQUE:  Helically acquired imaging through the right shoulder were obtained without the IV administration of contrast. Axial, " sagittal and coronal reformations were created and interpreted.    Automated tube current modulation, weight-based exposure dosing, and/or iterative reconstruction technique utilized to reach lowest reasonably achievable exposure rate.    DLP: 332 mGy*cm    COMPARISON:  Plain radiograph right shoulder 01/21/2025    FINDINGS:  BONES/JOINTS: Comminuted, impacted fracture of the humeral head/neck and greater tuberosity.  There is a shoulder joint effusion with mild subluxation.  Degenerative change at the acromioclavicular joint.  Subtle cortical contour deformity at right 3rd and 4th ribs anterolaterally.    SOFT TISSUES: Soft tissue swelling.    OTHER: N/A  Impression: Impacted fracture of the proximal humerus    Electronically signed by: Yaneli Lemus  Date:    01/21/2025  Time:    15:19  CT Head Without Contrast  Narrative: EXAMINATION:  CT HEAD WITHOUT CONTRAST    CLINICAL HISTORY:  fall, old;    TECHNIQUE:  Low dose axial CT images obtained throughout the head without intravenous contrast.  Axial, sagittal and coronal reconstructions were performed and interpreted.    DLP: 862 mGycm    All CT scans at this location are performed using dose optimization techniques as appropriate to a performed exam including the following automated exposure control, adjustment of the mA and/or kV according to patient size and/or use of iterative reconstruction technique    COMPARISON:  No relevant prior available for comparison.    FINDINGS:  BRAIN: Gray white differentiation is maintained. Periventricular and subcortical white matter changes most compatible with chronic small vessel ischemic disease.  Moderate cerebral atrophy.  No hemorrhage. No edema. No mass effect or midline shift.  The posterior fossa and midline structures are unremarkable.    VENTRICLES: Ex vacuo dilatation of the ventricles.    EXTRA-AXIAL: No abnormal extra-axial collections.    BONES: Calvarium is intact.    SINUSES AND MASTOIDS: Mucoperiosteal  "thickening at the right maxillary sinus.  Impression: No appreciable acute intracranial abnormality.    Periventricular and subcortical white matter changes and cerebral atrophy most compatible with chronic small vessel ischemic disease.    Electronically signed by: Yaneli Lemus  Date:    01/21/2025  Time:    15:06  X-Ray Shoulder Complete 2 View Right  Narrative: EXAMINATION:  XR SHOULDER COMPLETE 2 OR MORE VIEWS RIGHT    CLINICAL HISTORY:  Unspecified fall, initial encounter    TECHNIQUE:  Three views of the right shoulder were performed.    COMPARISON  None    FINDINGS:  BONES: Impacted fracture at the humeral head/neck with involvement of the greater tuberosity.  Inferior subluxation of the humeral head.    SOFT TISSUES:  Regional soft tissues are normal.  Impression: Proximal humerus fracture.    Electronically signed by: Yaneli Lemus  Date:    01/21/2025  Time:    14:49        2D ECHO Results    No results found for this or any previous visit.        Pulmonary Functions Testing Results:    No results found for: "FEV1", "FVC", "WNN7SSD", "TLC", "DLCO"    Assessment & Plan:     Orthostatic hypotension vs Vasovagal syncope  Dehydration  -EMS reported drop in blood pressure and heart rate prior to loss of consciousness  -patient received 1 L LR in the ED in 1 L on the floor.  -repeat orthostatics  -troponin negative    Microcytic anemia  Iron-deficiency anemia  -patient has prior history of iron infusions in 2015, reports she only did this for a short period of time.  Reports she is unsure what caused her iron to be low at that time.  -iron studies show low iron 26, low iron saturation 8, normal total iron binding capacity, normal ferritin.  -replete with Ferrlecit for second dose today.     Alzheimer's vs vascular dementia  -CT head without contrast showed Periventricular and subcortical white matter changes and cerebral atrophy most compatible with chronic small vessel ischemic disease.  -continue home " dose memantine 10 mg b.i.d.    Diabetes  -holding home meds  -started on Lantus 10 units q.h.s. and low-dose sliding scale    Right shoulder fracture  -X-ray and CT arm of the right shoulder shows impacted proximal humerus fracture  -E consult to Orthopedic surgery, recommended no surgical intervention at this time.  Follow up in 10 days following discharge.  -p.r.n. pain meds     Hypothyroidism  -continue home dose levothyroxine 100 mcg q.d.  -TSH and free T4 WNL    Hypertension  Hyperlipidemia  -continue home meds:  Atorvastatin 20 mg q.d., lisinopril 20 mg q.d.        CODE STATUS: Full Code   Access: PIV  Antibiotics: -  Diet: Diet diabetic 2000 Calories (up to 75 gm per meal)  DVT Prophylaxis: Lovenox   GI Prophylaxis: famotidine/ carafate  Fluids:  -      Disposition: Brittany Nunn is a 89 y.o. female who is admitted for orthostatic hypotension with right proximal humerus fracture.  Discharge pending SNF placement.      Shima Kumar DO  [facility] Internal Medicine, PGY-1  01/23/2025

## 2025-01-23 NOTE — PLAN OF CARE
[facility] has denied. Spoke to patient's daughter, Shabnam Patel, P: [phone], to provide update. Next choices are [facility], and [facility]. Referrals sent via Epic.

## 2025-01-23 NOTE — PLAN OF CARE
Problem: Adult Inpatient Plan of Care  Goal: Plan of Care Review  Outcome: Progressing  Goal: Patient-Specific Goal (Individualized)  Outcome: Progressing  Goal: Absence of Hospital-Acquired Illness or Injury  Outcome: Progressing  Goal: Optimal Comfort and Wellbeing  Outcome: Progressing  Goal: Readiness for Transition of Care  Outcome: Progressing     Problem: Pain Acute  Goal: Optimal Pain Control and Function  Outcome: Progressing     Problem: Orthopaedic Fracture  Goal: Absence of Bleeding  Outcome: Progressing  Goal: Bowel Elimination  Outcome: Progressing  Goal: Absence of Embolism Signs and Symptoms  Outcome: Progressing  Goal: Fracture Stability  Outcome: Progressing  Goal: Optimal Functional Ability  Outcome: Progressing  Goal: Absence of Infection Signs and Symptoms  Outcome: Progressing  Goal: Effective Tissue Perfusion  Outcome: Progressing  Goal: Optimal Pain Control and Function  Outcome: Progressing  Goal: Effective Oxygenation and Ventilation  Outcome: Progressing     Problem: Fall Injury Risk  Goal: Absence of Fall and Fall-Related Injury  Outcome: Progressing

## 2025-01-24 LAB
ALBUMIN SERPL-MCNC: 3.2 G/DL (ref 3.4–4.8)
ALBUMIN/GLOB SERPL: 1 RATIO (ref 1.1–2)
ALP SERPL-CCNC: 62 UNIT/L (ref 40–150)
ALT SERPL-CCNC: 10 UNIT/L (ref 0–55)
ANION GAP SERPL CALC-SCNC: 10 MEQ/L
AST SERPL-CCNC: 17 UNIT/L (ref 5–34)
BASOPHILS # BLD AUTO: 0.06 X10(3)/MCL
BASOPHILS NFR BLD AUTO: 0.7 %
BILIRUB SERPL-MCNC: 0.4 MG/DL
BUN SERPL-MCNC: 21.1 MG/DL (ref 9.8–20.1)
CALCIUM SERPL-MCNC: 9.5 MG/DL (ref 8.4–10.2)
CHLORIDE SERPL-SCNC: 106 MMOL/L (ref 98–107)
CO2 SERPL-SCNC: 26 MMOL/L (ref 23–31)
CREAT SERPL-MCNC: 0.79 MG/DL (ref 0.55–1.02)
CREAT/UREA NIT SERPL: 27
EOSINOPHIL # BLD AUTO: 0.29 X10(3)/MCL (ref 0–0.9)
EOSINOPHIL NFR BLD AUTO: 3.3 %
ERYTHROCYTE [DISTWIDTH] IN BLOOD BY AUTOMATED COUNT: 15.8 % (ref 11.5–17)
GFR SERPLBLD CREATININE-BSD FMLA CKD-EPI: >60 ML/MIN/1.73/M2
GLOBULIN SER-MCNC: 3.3 GM/DL (ref 2.4–3.5)
GLUCOSE SERPL-MCNC: 170 MG/DL (ref 82–115)
HCT VFR BLD AUTO: 28 % (ref 37–47)
HGB BLD-MCNC: 8.7 G/DL (ref 12–16)
IMM GRANULOCYTES # BLD AUTO: 0.06 X10(3)/MCL (ref 0–0.04)
IMM GRANULOCYTES NFR BLD AUTO: 0.7 %
LYMPHOCYTES # BLD AUTO: 3.05 X10(3)/MCL (ref 0.6–4.6)
LYMPHOCYTES NFR BLD AUTO: 34.3 %
MCH RBC QN AUTO: 24.9 PG (ref 27–31)
MCHC RBC AUTO-ENTMCNC: 31.1 G/DL (ref 33–36)
MCV RBC AUTO: 80.2 FL (ref 80–94)
MONOCYTES # BLD AUTO: 0.66 X10(3)/MCL (ref 0.1–1.3)
MONOCYTES NFR BLD AUTO: 7.4 %
NEUTROPHILS # BLD AUTO: 4.78 X10(3)/MCL (ref 2.1–9.2)
NEUTROPHILS NFR BLD AUTO: 53.6 %
NRBC BLD AUTO-RTO: 0 %
OHS QRS DURATION: 122 MS
OHS QRS DURATION: 124 MS
OHS QTC CALCULATION: 492 MS
OHS QTC CALCULATION: 526 MS
PLATELET # BLD AUTO: 222 X10(3)/MCL (ref 130–400)
PMV BLD AUTO: 9.6 FL (ref 7.4–10.4)
POCT GLUCOSE: 175 MG/DL (ref 70–110)
POCT GLUCOSE: 235 MG/DL (ref 70–110)
POCT GLUCOSE: 260 MG/DL (ref 70–110)
POCT GLUCOSE: 280 MG/DL (ref 70–110)
POTASSIUM SERPL-SCNC: 4.2 MMOL/L (ref 3.5–5.1)
PROT SERPL-MCNC: 6.5 GM/DL (ref 5.8–7.6)
RBC # BLD AUTO: 3.49 X10(6)/MCL (ref 4.2–5.4)
SODIUM SERPL-SCNC: 142 MMOL/L (ref 136–145)
WBC # BLD AUTO: 8.9 X10(3)/MCL (ref 4.5–11.5)

## 2025-01-24 PROCEDURE — 85025 COMPLETE CBC W/AUTO DIFF WBC: CPT

## 2025-01-24 PROCEDURE — 36415 COLL VENOUS BLD VENIPUNCTURE: CPT

## 2025-01-24 PROCEDURE — 97535 SELF CARE MNGMENT TRAINING: CPT

## 2025-01-24 PROCEDURE — 25000003 PHARM REV CODE 250

## 2025-01-24 PROCEDURE — 80053 COMPREHEN METABOLIC PANEL: CPT

## 2025-01-24 PROCEDURE — 97166 OT EVAL MOD COMPLEX 45 MIN: CPT

## 2025-01-24 PROCEDURE — 11000001 HC ACUTE MED/SURG PRIVATE ROOM

## 2025-01-24 PROCEDURE — 63600175 PHARM REV CODE 636 W HCPCS

## 2025-01-24 PROCEDURE — 97162 PT EVAL MOD COMPLEX 30 MIN: CPT

## 2025-01-24 RX ADMIN — ENOXAPARIN SODIUM 40 MG: 40 INJECTION SUBCUTANEOUS at 05:01

## 2025-01-24 RX ADMIN — ACETAMINOPHEN 650 MG: 325 TABLET, FILM COATED ORAL at 03:01

## 2025-01-24 RX ADMIN — LEVOTHYROXINE SODIUM 100 MCG: 0.1 TABLET ORAL at 05:01

## 2025-01-24 RX ADMIN — INSULIN ASPART 2 UNITS: 100 INJECTION, SOLUTION INTRAVENOUS; SUBCUTANEOUS at 05:01

## 2025-01-24 RX ADMIN — ALUMINUM HYDROXIDE, MAGNESIUM HYDROXIDE, AND DIMETHICONE 30 ML: 200; 20; 200 SUSPENSION ORAL at 05:01

## 2025-01-24 RX ADMIN — MEMANTINE HYDROCHLORIDE 10 MG: 5 TABLET ORAL at 08:01

## 2025-01-24 RX ADMIN — INSULIN ASPART 1 UNITS: 100 INJECTION, SOLUTION INTRAVENOUS; SUBCUTANEOUS at 08:01

## 2025-01-24 RX ADMIN — ACETAMINOPHEN 650 MG: 325 TABLET, FILM COATED ORAL at 10:01

## 2025-01-24 RX ADMIN — ALUMINUM HYDROXIDE, MAGNESIUM HYDROXIDE, AND DIMETHICONE 30 ML: 200; 20; 200 SUSPENSION ORAL at 08:01

## 2025-01-24 RX ADMIN — LISINOPRIL 20 MG: 10 TABLET ORAL at 08:01

## 2025-01-24 RX ADMIN — ALUMINUM HYDROXIDE, MAGNESIUM HYDROXIDE, AND DIMETHICONE 30 ML: 200; 20; 200 SUSPENSION ORAL at 12:01

## 2025-01-24 RX ADMIN — ATORVASTATIN CALCIUM 20 MG: 20 TABLET, FILM COATED ORAL at 08:01

## 2025-01-24 RX ADMIN — INSULIN ASPART 3 UNITS: 100 INJECTION, SOLUTION INTRAVENOUS; SUBCUTANEOUS at 12:01

## 2025-01-24 RX ADMIN — INSULIN GLARGINE 10 UNITS: 100 INJECTION, SOLUTION SUBCUTANEOUS at 08:01

## 2025-01-24 RX ADMIN — KETOROLAC TROMETHAMINE 15 MG: 30 INJECTION, SOLUTION INTRAMUSCULAR; INTRAVENOUS at 12:01

## 2025-01-24 NOTE — PLAN OF CARE
Problem: Occupational Therapy  Goal: Occupational Therapy Goal  Description: Goals to be met by: d/c     Patient will increase functional independence with ADLs by performing:    UE Dressing with Stand-by Assistance .  LE Dressing with Modified [place].  Grooming while standing at sink with Modified [place].  Toileting from raised toilet with Modified [place] for hygiene and clothing management.   Toilet transfer to toilet with Modified [place].  Pt will ambulate short distances during self care routine >20 feet with LRAD and supervision.     Outcome: Progressing

## 2025-01-24 NOTE — PT/OT/SLP EVAL
Occupational Therapy   Evaluation    Name: Brittany Nunn  MRN: [id number]  Admitting Diagnosis:   Fall   Closed displaced comminuted fracture of shaft of right humerus, initial encounter (Primary)  Syncope, unspecified syncope type  Fall from standing, initial encounter  Orthostatic hypotension   Patient Active Problem List   Diagnosis    Orthostatic hypotension      Per chart  Pt sustained a ground level fall at home after trip and slip (states she tripped over a shoe; did not hit head, no LOC) and called EMS. Upon arrival EMS witnessed pt trip and fall a second time (again a trip over an obstacle while walking, no LOC). EMS assisted up to a chair when pt had sudden bradycardia and hypotension causing syncope (did not hit head)    Recent Surgery: * No surgery found *      Recommendations:     Discharge Recommendations: Moderate Intensity Therapy  Discharge Equipment Recommendations:  other (see comments) (assistive device for ambulation  TBD pending progress)  Barriers to discharge:  Decreased caregiver support    Assessment:     Brittany Nunn is a 89 y.o. female with a medical diagnosis of Orthostatic hypotension, syncope , closed displaced comminuted fx of shaft of right humerus   She presents with hx of recent fall due to syncope episode at home and no orthostatic episodes and no c/o of light headedness or dizziness during eval process. Pt with very limited assist at home and requiring assist with basic self care tasks and mobility at this time. Performance deficits affecting function: weakness, impaired endurance, impaired self care skills, impaired functional mobility, gait instability, impaired balance, decreased upper extremity function, pain, decreased ROM, impaired coordination, orthopedic precautions.      Rehab Prognosis: Good; patient would benefit from acute skilled OT services to address these deficits and reach maximum level of function.       Plan:     Patient to be seen 3 x/week to address the  "above listed problems via self-care/home management, therapeutic activities, therapeutic exercises  Plan of Care Expires:  (d/c)  Plan of Care Reviewed with: patient    Subjective     Chief Complaint: pain R humerus and lower back and weakness   Patient/Family Comments/goals: get stronger ; pt expressed that she has very limited assist at home     Occupational Profile:  Living Environment: Pt lives with 91 year old spouse who has medical issues and only able to provide very limited assistance and DIL and adult grandson who provide very limited assist in single story house with no steps and walk in shower with shower chair    Previous level of function: Pt was independent basic self care tasks and ambulated primarily without AD and used rollator "at times " when she felt weak and unsteady. Pt reported "all living in the household take care of their own chores"  Roles and Routines: Pt does not drive and granddaughter provides transport as needed ; pt reported not cooking and preparing "instant" meals or simple "meals" like smooth or protein bar.;   Equipment Used at Home: raised toilet, grab bar, shower chair, rollator  Assistance upon Discharge: very limited assist from family and spouse     Pain/Comfort:  Pain Rating 1: 5/10  Location - Side 1: Right  Location 1: other (see comments) (humerus)  Pain Addressed 1: Nurse notified, Reposition  Pain Rating Post-Intervention 1: 7/10  Pain Rating 2: 0/10  Location - Orientation 2: lower  Location 2: back  Pain Addressed 2: Nurse notified, Cessation of Activity  Pain Rating Post-Intervention 2: 6/10 (after prolonged standing)    Patients cultural, spiritual, Confucianism conflicts given the current situation: no    Objective:     Communicated with: Nurse Ahn prior to session.  Patient found HOB elevated with bed alarm, PureWick, peripheral IV upon OT entry to room. RUE sling in the bed but not "on the patient".     General Precautions: Standard, fall, hearing impaired " "(hearing aides)  Orthopedic Precautions: RUE non weight bearing  Braces: UE Sling  Respiratory Status: Room air  VITALS  Supine   Seated EOB  Standing   /70  126/62   123/66    112   125  O2 96%    Occupational Performance:    Bed Mobility:    Patient completed Supine to Sit with contact guard assistance    Functional Mobility/Transfers:  Patient completed Sit <> Stand Transfer with contact guard assistance  with  no assistive device   Patient completed Bed <> Chair Transfer using Step Transfer technique with contact guard assistance with no assistive device  Patient completed Toilet Transfer Step Transfer technique with contact guard assistance with  grab bars  Functional Mobility: Pt ambulated in room ~ 25 feet during self care routine without AD and with CGA and HHA; no LOB however due to increased back pain after increased standing and mobility, pt with increased trunk flexion and  began reaching for surroundings for stability. Pt c/o of back pain and "weakness"    Activities of Daily Living:  Grooming: stand by assistance standing at sink to brush teeth, wash face and hands ; flexed posture   Lower Body Dressing: Pt able to thread underwear over feet  and min assistance pulling over hips in standing   ; max A don socks while seated EOB due to  R UE in sling and limited use at this time   Toileting: min assistance standing phase clothing mgt and CGA  standing phase during hygiene after +BM and +void     Cognitive/Visual Perceptual:  Cognitive/Psychosocial Skills:     -       Oriented to: Person, Place, Time, and Situation   -       Follows Commands/attention:Follows multistep  commands  -       Safety awareness/insight to disability: Fair    -       Mood/Affect/Coping skills/emotional control: Cooperative and Pleasant    Physical Exam:  Balance: -       sitting static and dynamic mod independent ; standing balance static SBA and dynamic CG to occasional min A   Dominant hand: -       right; noted " extensive bruising R upper arm /humeral area   Upper Extremity Range of Motion:  -       Right Upper Extremity: R UE in sling due to closed displaced comminuted fx of shaft of right humerus; AROM forearm , wrist and hand WFL  -       Left Upper Extremity: WFL  Upper Extremity Strength: -       Right Upper Extremity: NT  -       Left Upper Extremity: WFL   Strength: -       Right Upper Extremity: F+  -       Left Upper Extremity: WFL  Fine Motor Coordination:  Pt able to oppose thumb to digits right and left hand     Treatment & Education:  Pt. educated on OT goals, POC, orientation to environment, use of call bell for assist with transfers OOB or for any other needs due to fall risk.     ADL retraining with modified techniques during toileting and LB dressing due to limited use R UE and in sling ;     Patient left up in chair with R UE sling in place and R UE supported on pillow  with all lines intact, call button in reach, chair alarm on, and nurse  notified    GOALS:   Multidisciplinary Problems       Occupational Therapy Goals          Problem: Occupational Therapy    Goal Priority Disciplines Outcome Interventions   Occupational Therapy Goal     OT, PT/OT Progressing    Description: Goals to be met by: d/c     Patient will increase functional independence with ADLs by performing:    UE Dressing with Stand-by Assistance .  LE Dressing with Modified [place].  Grooming while standing at sink with Modified [place].  Toileting from raised toilet with Modified [place] for hygiene and clothing management.   Toilet transfer to toilet with Modified [place].  Pt will ambulate short distances during self care routine >20 feet with LRAD and supervision.                          History:     History reviewed. No pertinent past medical history.    History reviewed. No pertinent surgical history.    Time Tracking:     OT Date of Treatment: 01/24/25  OT Start Time: 0956  OT Stop Time: 1052  OT Total Time  (min): 56 min    Billable Minutes:Evaluation 30 min   Self Care/Home Management 26 min     1/24/2025

## 2025-01-24 NOTE — PT/OT/SLP EVAL
Physical Therapy Evaluation    Patient Name:  Brittany Nunn   MRN:  [id number]    Recommendations:     Therapy Intensity Recommendations at Discharge: Moderate Intensity Therapy  Discharge Equipment Recommendations:  (AD for use with L UE only; TBD which AD pending progress)   Equipment to be obtained for discharge: TBD pending progress.  Barriers to discharge: fall risk and decreased caregiver support    Assessment:     Brittany Nunn is a 89 y.o. female admitted with a medical diagnosis of Orthostatic hypotension.  1. Closed displaced comminuted fracture of shaft of left humerus, initial encounter    2. Fall    3. Syncope, unspecified syncope type    4. Fall from standing, initial encounter    5. Orthostatic hypotension    6. Chest pain       Patient Active Problem List   Diagnosis    Orthostatic hypotension      She presents with the following impairments/functional limitations:  weakness, impaired endurance, impaired functional mobility, gait instability, impaired balance, decreased upper extremity function, pain, orthopedic precautions, edema.    Rehab Prognosis: Good.    Patient would benefit from continued skilled acute PT services to: address above listed impairments/functional limitations; receive patient/caregiver education; reduce fall risk; and maximize independency/safety with functional mobility.    Recent Surgery: * No surgery found *      Plan:     During this hospitalization, patient to be seen 5 x/week to address the identified impairments/functional limitations via gait training, therapeutic activities, therapeutic exercises, neuromuscular re-education and progress toward the established goals.    Plan of Care Expires:  02/23/25    Subjective     Communicated with patient's nurse  prior to session.    Patient agreeable to participate in evaluation.     Chief Complaint: pain  Patient/Family Comments/goals: to get stronger  Pain/Comfort:  Pain Rating 1: 5/10  Location - Side 1: Right  Location 1:  arm  Pain Addressed 1: Nurse notified, Reposition, Cessation of Activity  Pain Rating Post-Intervention 1: 9/10    Patients cultural, spiritual, Sikhism conflicts given the current situation: no    Social History  Living Environment: Patient lives with spouse in a single level home, with no steps, with walk-in shower with shower chair.  Functional Level: Prior to admission patient was independent in ADL's and ambulated with assistive device.  Equipment Used at Home: rollator  Equipment owned (not currently used): none.  Assistance Upon Discharge:  limited assistance from spouse and family .    Objective:     Patient found sitting in chair with chair check, PureWick, peripheral IV  upon PT entry to room.    General Precautions: Standard, fall   Orthopedic Precautions:RUE non weight bearing   Braces:  UE Sling  Respiratory Status: room air    Vitals   At Rest (pre-session)  BP  92/50   HR  89   O2 Sat %        With Activity (post-session)  BP  101/50   HR  130   O2 Sat %       Exams:  Orientation: Patient is oriented to person, place, time, situation  Commands: Patient follows commands consistently  BILAT UE ROM/strength - defer to OT - see OT note for details  RLE ROM: WFL  RLE Strength: WFL  LLE ROM: WFL  LLE Strength: WFL    Functional Mobility:    Bed Mobility:  Seated in chair  at start of session and left in chair at end of session    Transfers:  Sit to Stand: stand by assistance with no assistive device  Stand to Sit: stand by assistance with no assistive device  with cues for hand placement    Gait:  Patient ambulated 80ft with hand-held assist and minimum assistance.  Patient demonstrates :       occasional unsteady gait       decreased simone       decreased bilateral step length.    Other Mobility:  N/A    Balance:  Sit  Static: NORMAL: No deviations seen in posture held statically  Dynamic: GOOD: Maintains balance through MODERATE excursions of active trunk movement  Stand  Static: FAIR: Maintains  without assist but unable to take challenges  Dynamic: POOR+: Needs MIN (minimal ) assist during gait    Additional Treatment Session  N/A    Patient left sitting in chair with all lines intact, call button in reach, tray table at bedside, patient's nurse notified, and chair alarm on.      Education     Patient educated on the importance of early mobility to prevent functional decline during hospital stay.  Patient educated on and assisted with functional mobility as noted above.  Patient educated on PT Plan of Care and role of PT in acute care.  Patient was instructed to utilize staff assistance for mobility/transfers.  White board updated regarding patient's safest level of mobility with staff assistance    Goals     Multidisciplinary Problems       Physical Therapy Goals          Problem: Physical Therapy    Goal Priority Disciplines Outcome Interventions   Physical Therapy Goal     PT, PT/OT Progressing    Description: Goals to be met by: 2025     Patient will increase functional independence with mobility by performin. Supine to sit with Modified Gillespie  2. Sit to supine with Modified Gillespie  3. Sit to stand transfer with Modified Gillespie  4. Gait  x 260 feet with Supervision using LRAD.                        History:   History reviewed. No pertinent past medical history.  History reviewed. No pertinent surgical history.  Time Tracking:     PT Received On: 25  PT Start Time: 1130     PT Stop Time: 1153  PT Total Time (min): 23 min     Billable Minutes: Evaluation , moderate complexity    2025

## 2025-01-24 NOTE — PLAN OF CARE
Problem: Adult Inpatient Plan of Care  Goal: Plan of Care Review  Outcome: Progressing  Flowsheets (Taken 1/24/2025 1025)  Plan of Care Reviewed With: patient  Goal: Patient-Specific Goal (Individualized)  Outcome: Progressing  Goal: Absence of Hospital-Acquired Illness or Injury  Outcome: Progressing  Goal: Optimal Comfort and Wellbeing  Outcome: Progressing  Goal: Readiness for Transition of Care  Outcome: Progressing     Problem: Pain Acute  Goal: Optimal Pain Control and Function  Outcome: Progressing     Problem: Orthopaedic Fracture  Goal: Absence of Bleeding  Outcome: Progressing  Goal: Bowel Elimination  Outcome: Progressing  Goal: Absence of Embolism Signs and Symptoms  Outcome: Progressing  Goal: Fracture Stability  Outcome: Progressing  Goal: Optimal Functional Ability  Outcome: Progressing  Goal: Absence of Infection Signs and Symptoms  Outcome: Progressing  Goal: Effective Tissue Perfusion  Outcome: Progressing  Goal: Optimal Pain Control and Function  Outcome: Progressing  Goal: Effective Oxygenation and Ventilation  Outcome: Progressing     Problem: Fall Injury Risk  Goal: Absence of Fall and Fall-Related Injury  Outcome: Progressing

## 2025-01-24 NOTE — PLAN OF CARE
Problem: Adult Inpatient Plan of Care  Goal: Plan of Care Review  Outcome: Progressing  Goal: Optimal Comfort and Wellbeing  Outcome: Progressing     Problem: Pain Acute  Goal: Optimal Pain Control and Function  Outcome: Progressing

## 2025-01-24 NOTE — PROGRESS NOTES
[facility] History and Physical     Patient Name: Brittany Nunn  MRN: [id number]  Admission Date: 1/21/2025  Hospital Length of Stay: 3 days  Code Status: Full Code  Attending Provider: Neil Mendoza MD  Primary Care Provider: Ester, Primary Doctor     Chief Complaint:   Fall, Loss of Consciousness, and Shoulder Pain (Presents via EMS. Sustained a ground level fall at home after trip and slip (states she tripped over a shoe; did not hit head, no LOC) and called EMS. Upon arrival EMS witnessed pt trip and fall a second time (again a trip over an obstacle while walking, no LOC). EMS assisted up to a chair when pt had sudden bradycardia and hypotension causing syncope (did not hit head).)      Subjective:      Brief HPI:  Brittany Nunn is a 89 y.o. female with past medical history of CVA/TIA, Alzheimer's, hyperlipidemia, GERD, hypothyroidism, hypertension, ovarian cancer s/p resection (refused chemotherapy after 1 session following procedure) and diabetes on insulin.  She presented to [facility] on 1/21/2025  with a primary complaint of fall on right shoulder.  Patient reports that she was distracted and slipped over her shoe and fell on her right shoulder.  She reported pain in that shoulder prior to the fall that had been going on for weeks.  She was unable to get up off the ground until EMS arrived and helped her up.  Patient's history deviates from this point from EMS accounts, likely secondary to history of Alzheimer's.  EMS reported that when patient was picked up off the ground she became hypotensive and bradycardic, she felt nauseated and subsequently vomited 1 time.  Following this episode patient loss consciousness for a few moments.  Patient and EMS agree there was no trauma to her head.  Patient lives at home with 91-year-old  and AIDS and taking care of him.  She also lives with her daughter-in-law who has a complicated drug his in his unable to confidently provide her with  support necessary to take care of her following this trauma. Patient reports pain in her right shoulder.  Patient denies weakness, dizziness, and loss of consciousness.  Patient denies any tobacco, recreational drug use, or alcohol history.    ED course:  Patient presented afebrile, vital signs stable, saturating 97% on room air.  CBC significant for leukocytosis WBC 14.81, microcytic anemia H/H 9.8/31.6.  CMP significant for BUN/creatinine 23.9/0.91, otherwise unremarkable.  CT head without contrast showed Periventricular and subcortical white matter changes and cerebral atrophy most compatible with chronic small vessel ischemic disease.  X-ray and CT arm of the right shoulder shows impacted proximal humerus fracture.  ED physician consulted Orthopedic surgery, determined patient requires no surgical intervention at this time.  She will likely need to follow up 10 days following discharge with Orthopedic surgery.  Internal medicine was consulted for orthostatic hypotension and right shoulder fracture.    Interval history:  No acute events overnight.  Vital signs stable.  CBC/CMP stable.  Tolerated OT well this morning.  No complaints.  Pending SNF placement.    Patient family history is not on file.       Patient  has no past surgical history on file.    Patient is allergic to cefdinir.    Patient  reports that she has never smoked. She has never been exposed to tobacco smoke. She has never used smokeless tobacco. She reports that she does not drink alcohol and does not use drugs.     No current outpatient medications       Review of Systems:  The remainder of the 14 point ROS is noncontributory or negative unless mentioned/reviewed above.     Objective:     Vital Signs:  Vital Signs (Most Recent):  Temp: 97.7 °F (36.5 °C) (01/24/25 0713)  Pulse: 94 (01/24/25 0713)  Resp: 18 (01/24/25 0713)  BP: (!) 145/69 (01/24/25 0713)  SpO2: 96 % (01/24/25 0713)  Body mass index is 25.56 kg/m².  Weight: 70.8 kg (156 lb) Vital  Signs (24h Range):  Temp:  [97.2 °F (36.2 °C)-98.6 °F (37 °C)] 97.7 °F (36.5 °C)  Pulse:  [] 94  Resp:  [18-19] 18  SpO2:  [94 %-99 %] 96 %  BP: (116-145)/(62-83) 145/69       Input/output:     Intake/Output Summary (Last 24 hours) at 1/24/2025 1106  Last data filed at 1/24/2025 0800  Gross per 24 hour   Intake 237 ml   Output 1800 ml   Net -1563 ml       Physical Exam  Constitutional:       General: She is not in acute distress.     Appearance: Normal appearance. She is not ill-appearing.   HENT:      Head: Normocephalic and atraumatic.      Mouth/Throat:      Mouth: Mucous membranes are dry.      Pharynx: Oropharynx is clear.   Eyes:      Extraocular Movements: Extraocular movements intact.      Conjunctiva/sclera: Conjunctivae normal.   Cardiovascular:      Rate and Rhythm: Normal rate and regular rhythm.      Pulses: Normal pulses.      Heart sounds: Normal heart sounds. No murmur heard.     No friction rub. No gallop.   Pulmonary:      Effort: Pulmonary effort is normal. No respiratory distress.      Breath sounds: Normal breath sounds. No stridor. No wheezing, rhonchi or rales.   Chest:      Chest wall: No tenderness.   Abdominal:      General: Abdomen is flat. Bowel sounds are normal. There is no distension.      Palpations: Abdomen is soft. There is no mass.      Tenderness: There is no abdominal tenderness. There is no guarding or rebound.      Hernia: No hernia is present.   Musculoskeletal:         General: Signs of injury (Right shoulder) present. No swelling.      Cervical back: Normal range of motion.   Skin:     General: Skin is warm and dry.      Capillary Refill: Capillary refill takes less than 2 seconds.      Coloration: Skin is pale.   Neurological:      General: No focal deficit present.      Mental Status: She is alert and oriented to person, place, and time.   Psychiatric:         Mood and Affect: Mood normal.         Behavior: Behavior normal.          Lines/Drains/Airways        "None                    Laboratory:    Recent Labs   Lab 01/24/25  0351   WBC 8.90   HGB 8.7*   HCT 28.0*      MCV 80.2   RDW 15.8     No results for input(s): "TROPONINI", "CKTOTAL", "CKMB", "BNP" in the last 24 hours.  Recent Labs   Lab 01/22/25  1025   TROPONINI <0.010     No results for input(s): "CHOL", "HDL", "LDLCALC", "TRIG", "CHOLHDL" in the last 168 hours. Recent Labs   Lab 01/24/25  0351      K 4.2   CO2 26   BUN 21.1*   CREATININE 0.79   CALCIUM 9.5     Recent Labs   Lab 01/24/25  0351   ALBUMIN 3.2*   BILITOT 0.4   AST 17   ALKPHOS 62   ALT 10     Recent Labs   Lab 01/21/25  1546   IRON 26*   TIBC 345   FERRITIN 10.11     Recent Labs   Lab 01/21/25  1546   TSH 1.267          Other Results:  Estimated Creatinine Clearance: 50.1 mL/min (based on SCr of 0.79 mg/dL).    Current Medications:     Infusions:          Scheduled:   aluminum-magnesium hydroxide-simethicone  30 mL Oral QID (AC & HS)    atorvastatin  20 mg Oral Daily    enoxparin  40 mg Subcutaneous Daily    insulin glargine U-100  10 Units Subcutaneous QHS    levothyroxine  100 mcg Oral Before breakfast    lisinopriL  20 mg Oral Daily    memantine  10 mg Oral BID         PRN:   aluminum-magnesium hydroxide-simethicone 200-200-20 mg/5 mL suspension 30 mL    atorvastatin tablet 20 mg    enoxaparin injection 40 mg    insulin glargine U-100 (Lantus) injection 10 Units    levothyroxine tablet 100 mcg    lisinopriL tablet 20 mg    memantine tablet 10 mg        Microbiology Data:  Microbiology Results (last 7 days)       ** No results found for the last 168 hours. **             Antibiotics and Day Number of Therapy:  Antibiotics (From admission, onward)      None             Imaging:  CT Arm (Humerus) Without Contrast Right  Narrative: EXAMINATION:  CT ARM (HUMERUS) WITHOUT CONTRAST RIGHT    CLINICAL HISTORY:  arm fx;    TECHNIQUE:  Helically acquired imaging through the right shoulder were obtained without the IV administration of " contrast. Axial, sagittal and coronal reformations were created and interpreted.    Automated tube current modulation, weight-based exposure dosing, and/or iterative reconstruction technique utilized to reach lowest reasonably achievable exposure rate.    DLP: 332 mGy*cm    COMPARISON:  Plain radiograph right shoulder 01/21/2025    FINDINGS:  BONES/JOINTS: Comminuted, impacted fracture of the humeral head/neck and greater tuberosity.  There is a shoulder joint effusion with mild subluxation.  Degenerative change at the acromioclavicular joint.  Subtle cortical contour deformity at right 3rd and 4th ribs anterolaterally.    SOFT TISSUES: Soft tissue swelling.    OTHER: N/A  Impression: Impacted fracture of the proximal humerus    Electronically signed by: Yaneli Lemus  Date:    01/21/2025  Time:    15:19  CT Head Without Contrast  Narrative: EXAMINATION:  CT HEAD WITHOUT CONTRAST    CLINICAL HISTORY:  fall, old;    TECHNIQUE:  Low dose axial CT images obtained throughout the head without intravenous contrast.  Axial, sagittal and coronal reconstructions were performed and interpreted.    DLP: 862 mGycm    All CT scans at this location are performed using dose optimization techniques as appropriate to a performed exam including the following automated exposure control, adjustment of the mA and/or kV according to patient size and/or use of iterative reconstruction technique    COMPARISON:  No relevant prior available for comparison.    FINDINGS:  BRAIN: Gray white differentiation is maintained. Periventricular and subcortical white matter changes most compatible with chronic small vessel ischemic disease.  Moderate cerebral atrophy.  No hemorrhage. No edema. No mass effect or midline shift.  The posterior fossa and midline structures are unremarkable.    VENTRICLES: Ex vacuo dilatation of the ventricles.    EXTRA-AXIAL: No abnormal extra-axial collections.    BONES: Calvarium is intact.    SINUSES AND MASTOIDS:  "Mucoperiosteal thickening at the right maxillary sinus.  Impression: No appreciable acute intracranial abnormality.    Periventricular and subcortical white matter changes and cerebral atrophy most compatible with chronic small vessel ischemic disease.    Electronically signed by: Yaneli Lemus  Date:    01/21/2025  Time:    15:06  X-Ray Shoulder Complete 2 View Right  Narrative: EXAMINATION:  XR SHOULDER COMPLETE 2 OR MORE VIEWS RIGHT    CLINICAL HISTORY:  Unspecified fall, initial encounter    TECHNIQUE:  Three views of the right shoulder were performed.    COMPARISON  None    FINDINGS:  BONES: Impacted fracture at the humeral head/neck with involvement of the greater tuberosity.  Inferior subluxation of the humeral head.    SOFT TISSUES:  Regional soft tissues are normal.  Impression: Proximal humerus fracture.    Electronically signed by: Yaneli Lemus  Date:    01/21/2025  Time:    14:49        2D ECHO Results    No results found for this or any previous visit.        Pulmonary Functions Testing Results:    No results found for: "FEV1", "FVC", "RGH0BVR", "TLC", "DLCO"    Assessment & Plan:     Orthostatic hypotension vs Vasovagal syncope  Dehydration  -EMS reported drop in blood pressure and heart rate prior to loss of consciousness  -patient received 1 L LR in the ED in 1 L on the floor.  -troponin negative    Microcytic anemia  Iron-deficiency anemia  -patient has prior history of iron infusions in 2015, reports she only did this for a short period of time.  Reports she is unsure what caused her iron to be low at that time.  -iron studies show low iron 26, low iron saturation 8, normal total iron binding capacity, normal ferritin.  -repleted with Ferrlecit x2    Alzheimer's vs vascular dementia  -CT head without contrast showed Periventricular and subcortical white matter changes and cerebral atrophy most compatible with chronic small vessel ischemic disease.  -continue home dose memantine 10 mg " b.i.d.    Diabetes  -holding home meds  -started on Lantus 10 units q.h.s. and low-dose sliding scale    Right shoulder fracture  -X-ray and CT arm of the right shoulder shows impacted proximal humerus fracture  -E consult to Orthopedic surgery, recommended no surgical intervention at this time.  Follow up in 10 days following discharge.  -p.r.n. pain meds     Hypothyroidism  -continue home dose levothyroxine 100 mcg q.d.  -TSH and free T4 WNL    Hypertension  Hyperlipidemia  -continue home meds:  Atorvastatin 20 mg q.d., lisinopril 20 mg q.d.        CODE STATUS: Full Code   Access: PIV  Antibiotics: -  Diet: Diet diabetic 2000 Calories (up to 75 gm per meal)  DVT Prophylaxis: Lovenox   GI Prophylaxis: famotidine/ carafate  Fluids:  -      Disposition: Brittany Nunn is a 89 y.o. female who is admitted for orthostatic hypotension with right proximal humerus fracture.  Discharge pending SNF placement.      Shima Kumar DO  [facility] Internal Medicine, PGY-1  01/24/2025

## 2025-01-25 LAB
ALBUMIN SERPL-MCNC: 3.3 G/DL (ref 3.4–4.8)
ALBUMIN/GLOB SERPL: 1 RATIO (ref 1.1–2)
ALP SERPL-CCNC: 64 UNIT/L (ref 40–150)
ALT SERPL-CCNC: 10 UNIT/L (ref 0–55)
ANION GAP SERPL CALC-SCNC: 9 MEQ/L
AST SERPL-CCNC: 16 UNIT/L (ref 5–34)
BASOPHILS # BLD AUTO: 0.07 X10(3)/MCL
BASOPHILS NFR BLD AUTO: 0.7 %
BILIRUB SERPL-MCNC: 0.6 MG/DL
BUN SERPL-MCNC: 24.3 MG/DL (ref 9.8–20.1)
CALCIUM SERPL-MCNC: 9.6 MG/DL (ref 8.4–10.2)
CHLORIDE SERPL-SCNC: 103 MMOL/L (ref 98–107)
CO2 SERPL-SCNC: 26 MMOL/L (ref 23–31)
CREAT SERPL-MCNC: 0.75 MG/DL (ref 0.55–1.02)
CREAT/UREA NIT SERPL: 32
EOSINOPHIL # BLD AUTO: 0.31 X10(3)/MCL (ref 0–0.9)
EOSINOPHIL NFR BLD AUTO: 3.1 %
ERYTHROCYTE [DISTWIDTH] IN BLOOD BY AUTOMATED COUNT: 15.6 % (ref 11.5–17)
GFR SERPLBLD CREATININE-BSD FMLA CKD-EPI: >60 ML/MIN/1.73/M2
GLOBULIN SER-MCNC: 3.3 GM/DL (ref 2.4–3.5)
GLUCOSE SERPL-MCNC: 230 MG/DL (ref 82–115)
HCT VFR BLD AUTO: 28.7 % (ref 37–47)
HGB BLD-MCNC: 8.8 G/DL (ref 12–16)
HOLD SPECIMEN: NORMAL
IMM GRANULOCYTES # BLD AUTO: 0.05 X10(3)/MCL (ref 0–0.04)
IMM GRANULOCYTES NFR BLD AUTO: 0.5 %
LYMPHOCYTES # BLD AUTO: 2.61 X10(3)/MCL (ref 0.6–4.6)
LYMPHOCYTES NFR BLD AUTO: 26.4 %
MCH RBC QN AUTO: 24.4 PG (ref 27–31)
MCHC RBC AUTO-ENTMCNC: 30.7 G/DL (ref 33–36)
MCV RBC AUTO: 79.5 FL (ref 80–94)
MONOCYTES # BLD AUTO: 0.6 X10(3)/MCL (ref 0.1–1.3)
MONOCYTES NFR BLD AUTO: 6.1 %
NEUTROPHILS # BLD AUTO: 6.23 X10(3)/MCL (ref 2.1–9.2)
NEUTROPHILS NFR BLD AUTO: 63.2 %
NRBC BLD AUTO-RTO: 0 %
PLATELET # BLD AUTO: 245 X10(3)/MCL (ref 130–400)
PMV BLD AUTO: 9.6 FL (ref 7.4–10.4)
POCT GLUCOSE: 232 MG/DL (ref 70–110)
POCT GLUCOSE: 276 MG/DL (ref 70–110)
POCT GLUCOSE: 283 MG/DL (ref 70–110)
POCT GLUCOSE: 301 MG/DL (ref 70–110)
POTASSIUM SERPL-SCNC: 4.2 MMOL/L (ref 3.5–5.1)
PROT SERPL-MCNC: 6.6 GM/DL (ref 5.8–7.6)
RBC # BLD AUTO: 3.61 X10(6)/MCL (ref 4.2–5.4)
SODIUM SERPL-SCNC: 138 MMOL/L (ref 136–145)
WBC # BLD AUTO: 9.87 X10(3)/MCL (ref 4.5–11.5)

## 2025-01-25 PROCEDURE — 36415 COLL VENOUS BLD VENIPUNCTURE: CPT

## 2025-01-25 PROCEDURE — 36415 COLL VENOUS BLD VENIPUNCTURE: CPT | Performed by: INTERNAL MEDICINE

## 2025-01-25 PROCEDURE — 63600175 PHARM REV CODE 636 W HCPCS

## 2025-01-25 PROCEDURE — 97535 SELF CARE MNGMENT TRAINING: CPT

## 2025-01-25 PROCEDURE — 97530 THERAPEUTIC ACTIVITIES: CPT

## 2025-01-25 PROCEDURE — 80053 COMPREHEN METABOLIC PANEL: CPT

## 2025-01-25 PROCEDURE — 85025 COMPLETE CBC W/AUTO DIFF WBC: CPT

## 2025-01-25 PROCEDURE — 25000003 PHARM REV CODE 250

## 2025-01-25 PROCEDURE — 11000001 HC ACUTE MED/SURG PRIVATE ROOM

## 2025-01-25 RX ORDER — ONDANSETRON 4 MG/1
4 TABLET, ORALLY DISINTEGRATING ORAL
Status: COMPLETED | OUTPATIENT
Start: 2025-01-26 | End: 2025-01-26

## 2025-01-25 RX ORDER — HYDROCODONE BITARTRATE AND ACETAMINOPHEN 5; 325 MG/1; MG/1
1 TABLET ORAL EVERY 6 HOURS PRN
Status: DISCONTINUED | OUTPATIENT
Start: 2025-01-25 | End: 2025-01-27

## 2025-01-25 RX ORDER — INSULIN GLARGINE 100 [IU]/ML
14 INJECTION, SOLUTION SUBCUTANEOUS NIGHTLY
Status: DISCONTINUED | OUTPATIENT
Start: 2025-01-25 | End: 2025-01-25

## 2025-01-25 RX ORDER — INSULIN GLARGINE 100 [IU]/ML
13 INJECTION, SOLUTION SUBCUTANEOUS NIGHTLY
Status: DISCONTINUED | OUTPATIENT
Start: 2025-01-25 | End: 2025-01-26

## 2025-01-25 RX ORDER — KETOROLAC TROMETHAMINE 30 MG/ML
15 INJECTION, SOLUTION INTRAMUSCULAR; INTRAVENOUS EVERY 6 HOURS PRN
Status: DISCONTINUED | OUTPATIENT
Start: 2025-01-25 | End: 2025-01-27 | Stop reason: HOSPADM

## 2025-01-25 RX ADMIN — ALUMINUM HYDROXIDE, MAGNESIUM HYDROXIDE, AND DIMETHICONE 30 ML: 200; 20; 200 SUSPENSION ORAL at 06:01

## 2025-01-25 RX ADMIN — INSULIN ASPART 3 UNITS: 100 INJECTION, SOLUTION INTRAVENOUS; SUBCUTANEOUS at 04:01

## 2025-01-25 RX ADMIN — ALUMINUM HYDROXIDE, MAGNESIUM HYDROXIDE, AND DIMETHICONE 30 ML: 200; 20; 200 SUSPENSION ORAL at 08:01

## 2025-01-25 RX ADMIN — MEMANTINE HYDROCHLORIDE 10 MG: 5 TABLET ORAL at 08:01

## 2025-01-25 RX ADMIN — INSULIN ASPART 3 UNITS: 100 INJECTION, SOLUTION INTRAVENOUS; SUBCUTANEOUS at 12:01

## 2025-01-25 RX ADMIN — INSULIN ASPART 1 UNITS: 100 INJECTION, SOLUTION INTRAVENOUS; SUBCUTANEOUS at 08:01

## 2025-01-25 RX ADMIN — ENOXAPARIN SODIUM 40 MG: 40 INJECTION SUBCUTANEOUS at 04:01

## 2025-01-25 RX ADMIN — LISINOPRIL 20 MG: 10 TABLET ORAL at 08:01

## 2025-01-25 RX ADMIN — INSULIN GLARGINE 13 UNITS: 100 INJECTION, SOLUTION SUBCUTANEOUS at 08:01

## 2025-01-25 RX ADMIN — ALUMINUM HYDROXIDE, MAGNESIUM HYDROXIDE, AND DIMETHICONE 30 ML: 200; 20; 200 SUSPENSION ORAL at 04:01

## 2025-01-25 RX ADMIN — LEVOTHYROXINE SODIUM 100 MCG: 0.1 TABLET ORAL at 06:01

## 2025-01-25 RX ADMIN — ACETAMINOPHEN 650 MG: 325 TABLET, FILM COATED ORAL at 09:01

## 2025-01-25 RX ADMIN — HYDROCODONE BITARTRATE AND ACETAMINOPHEN 1 TABLET: 5; 325 TABLET ORAL at 04:01

## 2025-01-25 RX ADMIN — ALUMINUM HYDROXIDE, MAGNESIUM HYDROXIDE, AND DIMETHICONE 30 ML: 200; 20; 200 SUSPENSION ORAL at 12:01

## 2025-01-25 RX ADMIN — INSULIN ASPART 2 UNITS: 100 INJECTION, SOLUTION INTRAVENOUS; SUBCUTANEOUS at 08:01

## 2025-01-25 RX ADMIN — ATORVASTATIN CALCIUM 20 MG: 20 TABLET, FILM COATED ORAL at 08:01

## 2025-01-25 NOTE — PT/OT/SLP PROGRESS
Occupational Therapy   Treatment    Name: Brittany Nunn  MRN: [id number]  Admitting Diagnosis:    Orthostatic hypotension     Fall  Closed displaced comminuted fracture of shaft of right humerus, initial encounter (Primary)  Syncope, unspecified syncope type  Fall from standing, initial encounter  Recommendations:     Discharge Recommendations: Moderate Intensity Therapy  Discharge Equipment Recommendations:  other (see comments) (AD for mobility TBD pending progress; Pt unable to utilize rollator due to R UE in sling)  Barriers to discharge:  Decreased caregiver support    Assessment:     Brittany Nunn is a 89 y.o. female with a medical diagnosis of Orthostatic hypotension ,syncope , closed displaced comminuted fx of shaft of right humerus .   She presents with increased pain R UE this am and limited tolerance of EOB and OOB activity and increased assistance during mobility and self care tasks. Performance deficits affecting function are weakness, impaired endurance, impaired self care skills, impaired functional mobility, gait instability, impaired balance, decreased upper extremity function, pain, impaired coordination, edema, orthopedic precautions.     Rehab Prognosis:  Good; patient would benefit from acute skilled OT services to address these deficits and reach maximum level of function.       Plan:     Patient to be seen 3 x/week to address the above listed problems via self-care/home management, therapeutic activities, therapeutic exercises  Plan of Care Expires:  (d/c)  Plan of Care Reviewed with: patient    Subjective     Chief Complaint: pain R UE   Patient/Family Comments/goals: decrease pain R UE   Pain/Comfort:  Pain Rating 1: 6/10  Location - Side 1: Right  Location 1: arm  Pain Addressed 1: Nurse notified, Reposition, Cessation of Activity  Pain Rating Post-Intervention 1: 9/10    Objective:     Communicated with: Nurse Ahn prior to session.  Patient found HOB elevated with Toni,  peripheral IV upon OT entry to room.    General Precautions: Standard, fall, hearing impaired    Orthopedic Precautions:RUE non weight bearing  Braces: UE Sling  Respiratory Status: Room air    VITALS  Seated EOB   Seated in chair (OOB)  /72  122/68     Occupational Performance:     Bed Mobility:    Patient completed Supine to Sit with minimum assistance and impacted by pain R UE  Patient completed Sit to Supine with moderate assistance and impacted by pain R UE    Functional Mobility/Transfers:  Patient completed Sit <> Stand Transfer with minimum assistance  with  hand-held assist   Patient completed Bed <> Chair Transfer using Step Transfer technique with minimum assistance with hand-held assist  Functional Mobility: Pts basic transfers impacted by pain and guarding R UE ; Pt denied ambulation short distances in room for self care tasks ;     Activities of Daily Living:  Grooming: Pt denied oral care while seated EOB and or in chair ; Pt wiped face with washcloth while seate up in chair    Bathing: total assistance UB wipe off while seated EOB and impacted by R UE pain   Upper Body Dressing: maximal assistance doff and don hospital gown while seated EOB   Toileting: Pt with purewick and refused attempt to transfer to [facility] and or ambulate to toilet due to increased R UE pain .       Treatment & Education:  Pt sat EOB ~ 12  min with SBA during UB wipe off and UB dressing; Pt sat up in chair ~ 5 min while nurse tech changed linens; pt requested to go back to bed due to increased pain R UE despite attempt to reposition R UE in sling and support on pillow ; Pt educated on importance of OOB activity and encouraged to sit up for meals.    Patient left HOB elevated and R UE sling and humeral support on wedge to improve R humaeral positioning and decrease R  guarding due to pain and with all lines intact, call button in reach, bed alarm on, and nurse  notified    GOALS:   Multidisciplinary Problems       Occupational  Therapy Goals          Problem: Occupational Therapy    Goal Priority Disciplines Outcome Interventions   Occupational Therapy Goal     OT, PT/OT Progressing    Description: Goals to be met by: d/c     Patient will increase functional independence with ADLs by performing:    UE Dressing with Stand-by Assistance .  LE Dressing with Modified [place].  Grooming while standing at sink with Modified [place].  Toileting from raised toilet with Modified [place] for hygiene and clothing management.   Toilet transfer to toilet with Modified [place].  Pt will ambulate short distances during self care routine >20 feet with LRAD and supervision.                            Time Tracking:     OT Date of Treatment: 01/25/25  OT Start Time: 0912  OT Stop Time: 0950  OT Total Time (min): 38 min    Billable Minutes:Self Care/Home Management 28 min   Therapeutic Activity 10 min     OT/EVELYN: OT          1/25/2025

## 2025-01-25 NOTE — PROGRESS NOTES
[facility] History and Physical     Patient Name: Brittany Nunn  MRN: [id number]  Admission Date: 1/21/2025  Hospital Length of Stay: 4 days  Code Status: Full Code  Attending Provider: Neil Mendoza MD  Primary Care Provider: Ester, Primary Doctor     Chief Complaint:   Fall, Loss of Consciousness, and Shoulder Pain (Presents via EMS. Sustained a ground level fall at home after trip and slip (states she tripped over a shoe; did not hit head, no LOC) and called EMS. Upon arrival EMS witnessed pt trip and fall a second time (again a trip over an obstacle while walking, no LOC). EMS assisted up to a chair when pt had sudden bradycardia and hypotension causing syncope (did not hit head).)      Subjective:      Brief HPI:  Brittany Nunn is a 89 y.o. female with past medical history of CVA/TIA, Alzheimer's, hyperlipidemia, GERD, hypothyroidism, hypertension, ovarian cancer s/p resection (refused chemotherapy after 1 session following procedure) and diabetes on insulin.  She presented to [facility] on 1/21/2025  with a primary complaint of fall on right shoulder.  Patient reports that she was distracted and slipped over her shoe and fell on her right shoulder.  She reported pain in that shoulder prior to the fall that had been going on for weeks.  She was unable to get up off the ground until EMS arrived and helped her up.  Patient's history deviates from this point from EMS accounts, likely secondary to history of Alzheimer's.  EMS reported that when patient was picked up off the ground she became hypotensive and bradycardic, she felt nauseated and subsequently vomited 1 time.  Following this episode patient loss consciousness for a few moments.  Patient and EMS agree there was no trauma to her head.  Patient lives at home with 91-year-old  and AIDS and taking care of him.  She also lives with her daughter-in-law who has a complicated drug his in his unable to confidently provide her with  support necessary to take care of her following this trauma. Patient reports pain in her right shoulder.  Patient denies weakness, dizziness, and loss of consciousness.  Patient denies any tobacco, recreational drug use, or alcohol history.    ED course:  Patient presented afebrile, vital signs stable, saturating 97% on room air.  CBC significant for leukocytosis WBC 14.81, microcytic anemia H/H 9.8/31.6.  CMP significant for BUN/creatinine 23.9/0.91, otherwise unremarkable.  CT head without contrast showed Periventricular and subcortical white matter changes and cerebral atrophy most compatible with chronic small vessel ischemic disease.  X-ray and CT arm of the right shoulder shows impacted proximal humerus fracture.  ED physician consulted Orthopedic surgery, determined patient requires no surgical intervention at this time.  She will likely need to follow up 10 days following discharge with Orthopedic surgery.  Internal medicine was consulted for orthostatic hypotension and right shoulder fracture.    Interval history:  No acute events overnight.  Vital signs stable.  CBC/CMP stable.  Tolerated PT/OT well this yesterday recommended moderate intensity therapy.  No complaints.  Pending SNF placement.    Patient family history is not on file.       Patient  has no past surgical history on file.    Patient is allergic to cefdinir.    Patient  reports that she has never smoked. She has never been exposed to tobacco smoke. She has never used smokeless tobacco. She reports that she does not drink alcohol and does not use drugs.     No current outpatient medications       Review of Systems:  The remainder of the 14 point ROS is noncontributory or negative unless mentioned/reviewed above.     Objective:     Vital Signs:  Vital Signs (Most Recent):  Temp: 98.5 °F (36.9 °C) (01/25/25 0319)  Pulse: 95 (01/25/25 0319)  Resp: 18 (01/25/25 0319)  BP: 138/88 (01/25/25 0319)  SpO2: 96 % (01/25/25 0319)  Body mass index is 25.56  kg/m².  Weight: 70.8 kg (156 lb) Vital Signs (24h Range):  Temp:  [97.3 °F (36.3 °C)-98.8 °F (37.1 °C)] 98.5 °F (36.9 °C)  Pulse:  [] 95  Resp:  [18] 18  SpO2:  [95 %-98 %] 96 %  BP: ()/(52-88) 138/88       Input/output:     Intake/Output Summary (Last 24 hours) at 1/25/2025 0630  Last data filed at 1/25/2025 0348  Gross per 24 hour   Intake 669 ml   Output 900 ml   Net -231 ml       Physical Exam  Constitutional:       General: She is not in acute distress.     Appearance: Normal appearance. She is not ill-appearing.   HENT:      Head: Normocephalic and atraumatic.      Mouth/Throat:      Mouth: Mucous membranes are dry.      Pharynx: Oropharynx is clear.   Eyes:      Extraocular Movements: Extraocular movements intact.      Conjunctiva/sclera: Conjunctivae normal.   Cardiovascular:      Rate and Rhythm: Normal rate and regular rhythm.      Pulses: Normal pulses.      Heart sounds: Normal heart sounds. No murmur heard.     No friction rub. No gallop.   Pulmonary:      Effort: Pulmonary effort is normal. No respiratory distress.      Breath sounds: Normal breath sounds. No stridor. No wheezing, rhonchi or rales.   Chest:      Chest wall: No tenderness.   Abdominal:      General: Abdomen is flat. Bowel sounds are normal. There is no distension.      Palpations: Abdomen is soft. There is no mass.      Tenderness: There is no abdominal tenderness. There is no guarding or rebound.      Hernia: No hernia is present.   Musculoskeletal:         General: Signs of injury (Right shoulder) present. No swelling.      Cervical back: Normal range of motion.   Skin:     General: Skin is warm and dry.      Capillary Refill: Capillary refill takes less than 2 seconds.      Coloration: Skin is pale.   Neurological:      General: No focal deficit present.      Mental Status: She is alert and oriented to person, place, and time.   Psychiatric:         Mood and Affect: Mood normal.         Behavior: Behavior normal.       "    Lines/Drains/Airways       None                    Laboratory:    Recent Labs   Lab 01/25/25  0352   WBC 9.87   HGB 8.8*   HCT 28.7*      MCV 79.5*   RDW 15.6     No results for input(s): "TROPONINI", "CKTOTAL", "CKMB", "BNP" in the last 24 hours.  Recent Labs   Lab 01/22/25  1025   TROPONINI <0.010     No results for input(s): "CHOL", "HDL", "LDLCALC", "TRIG", "CHOLHDL" in the last 168 hours. Recent Labs   Lab 01/25/25  0352      K 4.2   CO2 26   BUN 24.3*   CREATININE 0.75   CALCIUM 9.6     Recent Labs   Lab 01/25/25  0352   ALBUMIN 3.3*   BILITOT 0.6   AST 16   ALKPHOS 64   ALT 10     Recent Labs   Lab 01/21/25  1546   IRON 26*   TIBC 345   FERRITIN 10.11     Recent Labs   Lab 01/21/25  1546   TSH 1.267          Other Results:  Estimated Creatinine Clearance: 52.7 mL/min (based on SCr of 0.75 mg/dL).    Current Medications:     Infusions:          Scheduled:   aluminum-magnesium hydroxide-simethicone  30 mL Oral QID (AC & HS)    atorvastatin  20 mg Oral Daily    enoxparin  40 mg Subcutaneous Daily    insulin glargine U-100  10 Units Subcutaneous QHS    levothyroxine  100 mcg Oral Before breakfast    lisinopriL  20 mg Oral Daily    memantine  10 mg Oral BID         PRN:   aluminum-magnesium hydroxide-simethicone 200-200-20 mg/5 mL suspension 30 mL    atorvastatin tablet 20 mg    enoxaparin injection 40 mg    insulin glargine U-100 (Lantus) injection 10 Units    levothyroxine tablet 100 mcg    lisinopriL tablet 20 mg    memantine tablet 10 mg        Microbiology Data:  Microbiology Results (last 7 days)       ** No results found for the last 168 hours. **             Antibiotics and Day Number of Therapy:  Antibiotics (From admission, onward)      None             Imaging:  CT Arm (Humerus) Without Contrast Right  Narrative: EXAMINATION:  CT ARM (HUMERUS) WITHOUT CONTRAST RIGHT    CLINICAL HISTORY:  arm fx;    TECHNIQUE:  Helically acquired imaging through the right shoulder were obtained without " the IV administration of contrast. Axial, sagittal and coronal reformations were created and interpreted.    Automated tube current modulation, weight-based exposure dosing, and/or iterative reconstruction technique utilized to reach lowest reasonably achievable exposure rate.    DLP: 332 mGy*cm    COMPARISON:  Plain radiograph right shoulder 01/21/2025    FINDINGS:  BONES/JOINTS: Comminuted, impacted fracture of the humeral head/neck and greater tuberosity.  There is a shoulder joint effusion with mild subluxation.  Degenerative change at the acromioclavicular joint.  Subtle cortical contour deformity at right 3rd and 4th ribs anterolaterally.    SOFT TISSUES: Soft tissue swelling.    OTHER: N/A  Impression: Impacted fracture of the proximal humerus    Electronically signed by: Yaneli Lemus  Date:    01/21/2025  Time:    15:19  CT Head Without Contrast  Narrative: EXAMINATION:  CT HEAD WITHOUT CONTRAST    CLINICAL HISTORY:  fall, old;    TECHNIQUE:  Low dose axial CT images obtained throughout the head without intravenous contrast.  Axial, sagittal and coronal reconstructions were performed and interpreted.    DLP: 862 mGycm    All CT scans at this location are performed using dose optimization techniques as appropriate to a performed exam including the following automated exposure control, adjustment of the mA and/or kV according to patient size and/or use of iterative reconstruction technique    COMPARISON:  No relevant prior available for comparison.    FINDINGS:  BRAIN: Gray white differentiation is maintained. Periventricular and subcortical white matter changes most compatible with chronic small vessel ischemic disease.  Moderate cerebral atrophy.  No hemorrhage. No edema. No mass effect or midline shift.  The posterior fossa and midline structures are unremarkable.    VENTRICLES: Ex vacuo dilatation of the ventricles.    EXTRA-AXIAL: No abnormal extra-axial collections.    BONES: Calvarium is  "intact.    SINUSES AND MASTOIDS: Mucoperiosteal thickening at the right maxillary sinus.  Impression: No appreciable acute intracranial abnormality.    Periventricular and subcortical white matter changes and cerebral atrophy most compatible with chronic small vessel ischemic disease.    Electronically signed by: Yaneli Lemus  Date:    01/21/2025  Time:    15:06  X-Ray Shoulder Complete 2 View Right  Narrative: EXAMINATION:  XR SHOULDER COMPLETE 2 OR MORE VIEWS RIGHT    CLINICAL HISTORY:  Unspecified fall, initial encounter    TECHNIQUE:  Three views of the right shoulder were performed.    COMPARISON  None    FINDINGS:  BONES: Impacted fracture at the humeral head/neck with involvement of the greater tuberosity.  Inferior subluxation of the humeral head.    SOFT TISSUES:  [place] soft tissues are normal.  Impression: Proximal humerus fracture.    Electronically signed by: Yaneli Lemus  Date:    01/21/2025  Time:    14:49        2D ECHO Results    No results found for this or any previous visit.        Pulmonary Functions Testing Results:    No results found for: "FEV1", "FVC", "AXP9JDR", "TLC", "DLCO"    Assessment & Plan:     Orthostatic hypotension vs Vasovagal syncope  Dehydration  -EMS reported drop in blood pressure and heart rate prior to loss of consciousness  -patient received 1 L LR in the ED in 1 L on the floor.  -troponin negative    Microcytic anemia  Iron-deficiency anemia  -patient has prior history of iron infusions in 2015, reports she only did this for a short period of time.  Reports she is unsure what caused her iron to be low at that time.  -iron studies show low iron 26, low iron saturation 8, normal total iron binding capacity, normal ferritin.  -repleted with Ferrlecit x2    Alzheimer's vs vascular dementia  -CT head without contrast showed Periventricular and subcortical white matter changes and cerebral atrophy most compatible with chronic small vessel ischemic disease.  -continue " home dose memantine 10 mg b.i.d.    Diabetes  -holding home meds  -increased to Lantus 13 units q.h.s. and low-dose sliding scale    Right shoulder fracture  -X-ray and CT arm of the right shoulder shows impacted proximal humerus fracture  -E consult to Orthopedic surgery, recommended no surgical intervention at this time.  Follow up in 10 days following discharge.  -p.r.n. pain meds     Hypothyroidism  -continue home dose levothyroxine 100 mcg q.d.  -TSH and free T4 WNL    Hypertension  Hyperlipidemia  -continue home meds:  Atorvastatin 20 mg q.d., lisinopril 20 mg q.d.        CODE STATUS: Full Code   Access: PIV  Antibiotics: -  Diet: Diet diabetic 2000 Calories (up to 75 gm per meal)  DVT Prophylaxis: Lovenox   GI Prophylaxis: famotidine/ carafate  Fluids:  -      Disposition: Brittany Nunn is a 89 y.o. female who is admitted for orthostatic hypotension with right proximal humerus fracture.  Discharge pending SNF placement.      Shima Kumar DO  [facility] Internal Medicine, PGY-1  01/25/2025

## 2025-01-26 LAB
ALBUMIN SERPL-MCNC: 3.1 G/DL (ref 3.4–4.8)
ALBUMIN/GLOB SERPL: 0.9 RATIO (ref 1.1–2)
ALP SERPL-CCNC: 65 UNIT/L (ref 40–150)
ALT SERPL-CCNC: 11 UNIT/L (ref 0–55)
ANION GAP SERPL CALC-SCNC: 6 MEQ/L
AST SERPL-CCNC: 17 UNIT/L (ref 5–34)
BASOPHILS # BLD AUTO: 0.06 X10(3)/MCL
BASOPHILS NFR BLD AUTO: 0.5 %
BILIRUB SERPL-MCNC: 0.6 MG/DL
BUN SERPL-MCNC: 23.5 MG/DL (ref 9.8–20.1)
CALCIUM SERPL-MCNC: 9.3 MG/DL (ref 8.4–10.2)
CHLORIDE SERPL-SCNC: 101 MMOL/L (ref 98–107)
CO2 SERPL-SCNC: 27 MMOL/L (ref 23–31)
CREAT SERPL-MCNC: 0.98 MG/DL (ref 0.55–1.02)
CREAT/UREA NIT SERPL: 24
EOSINOPHIL # BLD AUTO: 0.06 X10(3)/MCL (ref 0–0.9)
EOSINOPHIL NFR BLD AUTO: 0.5 %
ERYTHROCYTE [DISTWIDTH] IN BLOOD BY AUTOMATED COUNT: 16.2 % (ref 11.5–17)
GFR SERPLBLD CREATININE-BSD FMLA CKD-EPI: 56 ML/MIN/1.73/M2
GLOBULIN SER-MCNC: 3.4 GM/DL (ref 2.4–3.5)
GLUCOSE SERPL-MCNC: 289 MG/DL (ref 82–115)
HCT VFR BLD AUTO: 28.2 % (ref 37–47)
HGB BLD-MCNC: 8.5 G/DL (ref 12–16)
HOLD SPECIMEN: NORMAL
IMM GRANULOCYTES # BLD AUTO: 0.08 X10(3)/MCL (ref 0–0.04)
IMM GRANULOCYTES NFR BLD AUTO: 0.7 %
LYMPHOCYTES # BLD AUTO: 2 X10(3)/MCL (ref 0.6–4.6)
LYMPHOCYTES NFR BLD AUTO: 16.4 %
MCH RBC QN AUTO: 24.5 PG (ref 27–31)
MCHC RBC AUTO-ENTMCNC: 30.1 G/DL (ref 33–36)
MCV RBC AUTO: 81.3 FL (ref 80–94)
MONOCYTES # BLD AUTO: 0.86 X10(3)/MCL (ref 0.1–1.3)
MONOCYTES NFR BLD AUTO: 7.1 %
NEUTROPHILS # BLD AUTO: 9.11 X10(3)/MCL (ref 2.1–9.2)
NEUTROPHILS NFR BLD AUTO: 74.8 %
NRBC BLD AUTO-RTO: 0 %
PLATELET # BLD AUTO: 246 X10(3)/MCL (ref 130–400)
PMV BLD AUTO: 9.6 FL (ref 7.4–10.4)
POCT GLUCOSE: 286 MG/DL (ref 70–110)
POCT GLUCOSE: 289 MG/DL (ref 70–110)
POCT GLUCOSE: 318 MG/DL (ref 70–110)
POCT GLUCOSE: 327 MG/DL (ref 70–110)
POTASSIUM SERPL-SCNC: 4.5 MMOL/L (ref 3.5–5.1)
PROT SERPL-MCNC: 6.5 GM/DL (ref 5.8–7.6)
RBC # BLD AUTO: 3.47 X10(6)/MCL (ref 4.2–5.4)
SODIUM SERPL-SCNC: 134 MMOL/L (ref 136–145)
WBC # BLD AUTO: 12.17 X10(3)/MCL (ref 4.5–11.5)

## 2025-01-26 PROCEDURE — 63600175 PHARM REV CODE 636 W HCPCS

## 2025-01-26 PROCEDURE — 80053 COMPREHEN METABOLIC PANEL: CPT

## 2025-01-26 PROCEDURE — 85025 COMPLETE CBC W/AUTO DIFF WBC: CPT

## 2025-01-26 PROCEDURE — 25000003 PHARM REV CODE 250

## 2025-01-26 PROCEDURE — 11000001 HC ACUTE MED/SURG PRIVATE ROOM

## 2025-01-26 PROCEDURE — 36415 COLL VENOUS BLD VENIPUNCTURE: CPT

## 2025-01-26 PROCEDURE — 36415 COLL VENOUS BLD VENIPUNCTURE: CPT | Performed by: INTERNAL MEDICINE

## 2025-01-26 RX ORDER — INSULIN ASPART 100 [IU]/ML
3 INJECTION, SOLUTION INTRAVENOUS; SUBCUTANEOUS
Status: DISCONTINUED | OUTPATIENT
Start: 2025-01-26 | End: 2025-01-27

## 2025-01-26 RX ORDER — INSULIN GLARGINE 100 [IU]/ML
17 INJECTION, SOLUTION SUBCUTANEOUS NIGHTLY
Status: DISCONTINUED | OUTPATIENT
Start: 2025-01-26 | End: 2025-01-27 | Stop reason: HOSPADM

## 2025-01-26 RX ADMIN — ALUMINUM HYDROXIDE, MAGNESIUM HYDROXIDE, AND DIMETHICONE 30 ML: 200; 20; 200 SUSPENSION ORAL at 08:01

## 2025-01-26 RX ADMIN — INSULIN ASPART 3 UNITS: 100 INJECTION, SOLUTION INTRAVENOUS; SUBCUTANEOUS at 12:01

## 2025-01-26 RX ADMIN — HYDROCODONE BITARTRATE AND ACETAMINOPHEN 1 TABLET: 5; 325 TABLET ORAL at 04:01

## 2025-01-26 RX ADMIN — ENOXAPARIN SODIUM 40 MG: 40 INJECTION SUBCUTANEOUS at 04:01

## 2025-01-26 RX ADMIN — INSULIN ASPART 4 UNITS: 100 INJECTION, SOLUTION INTRAVENOUS; SUBCUTANEOUS at 12:01

## 2025-01-26 RX ADMIN — INSULIN ASPART 4 UNITS: 100 INJECTION, SOLUTION INTRAVENOUS; SUBCUTANEOUS at 04:01

## 2025-01-26 RX ADMIN — ATORVASTATIN CALCIUM 20 MG: 20 TABLET, FILM COATED ORAL at 08:01

## 2025-01-26 RX ADMIN — ALUMINUM HYDROXIDE, MAGNESIUM HYDROXIDE, AND DIMETHICONE 30 ML: 200; 20; 200 SUSPENSION ORAL at 05:01

## 2025-01-26 RX ADMIN — INSULIN ASPART 3 UNITS: 100 INJECTION, SOLUTION INTRAVENOUS; SUBCUTANEOUS at 08:01

## 2025-01-26 RX ADMIN — MEMANTINE HYDROCHLORIDE 10 MG: 5 TABLET ORAL at 08:01

## 2025-01-26 RX ADMIN — ALUMINUM HYDROXIDE, MAGNESIUM HYDROXIDE, AND DIMETHICONE 30 ML: 200; 20; 200 SUSPENSION ORAL at 12:01

## 2025-01-26 RX ADMIN — HYDROCODONE BITARTRATE AND ACETAMINOPHEN 1 TABLET: 5; 325 TABLET ORAL at 12:01

## 2025-01-26 RX ADMIN — LEVOTHYROXINE SODIUM 100 MCG: 0.1 TABLET ORAL at 05:01

## 2025-01-26 RX ADMIN — INSULIN GLARGINE 17 UNITS: 100 INJECTION, SOLUTION SUBCUTANEOUS at 08:01

## 2025-01-26 RX ADMIN — ALUMINUM HYDROXIDE, MAGNESIUM HYDROXIDE, AND DIMETHICONE 30 ML: 200; 20; 200 SUSPENSION ORAL at 04:01

## 2025-01-26 RX ADMIN — INSULIN ASPART 3 UNITS: 100 INJECTION, SOLUTION INTRAVENOUS; SUBCUTANEOUS at 04:01

## 2025-01-26 RX ADMIN — INSULIN ASPART 1 UNITS: 100 INJECTION, SOLUTION INTRAVENOUS; SUBCUTANEOUS at 08:01

## 2025-01-26 RX ADMIN — ONDANSETRON 4 MG: 4 TABLET, ORALLY DISINTEGRATING ORAL at 12:01

## 2025-01-26 RX ADMIN — HYDROCODONE BITARTRATE AND ACETAMINOPHEN 1 TABLET: 5; 325 TABLET ORAL at 10:01

## 2025-01-27 ENCOUNTER — HOSPITAL ENCOUNTER (INPATIENT)
Facility: HOSPITAL | Age: 89
LOS: 11 days | Discharge: NURSING, REHAB OR LONG-TERM CARE | DRG: 560 | End: 2025-02-07
Attending: INTERNAL MEDICINE | Admitting: INTERNAL MEDICINE
Payer: MEDICARE

## 2025-01-27 VITALS
RESPIRATION RATE: 16 BRPM | OXYGEN SATURATION: 95 % | DIASTOLIC BLOOD PRESSURE: 63 MMHG | SYSTOLIC BLOOD PRESSURE: 123 MMHG | HEART RATE: 100 BPM | HEIGHT: 66 IN | TEMPERATURE: 99 F | BODY MASS INDEX: 23.92 KG/M2 | WEIGHT: 148.81 LBS

## 2025-01-27 DIAGNOSIS — R53.81 PHYSICAL DECONDITIONING: ICD-10-CM

## 2025-01-27 DIAGNOSIS — S42.201A CLOSED FRACTURE OF PROXIMAL END OF RIGHT HUMERUS, UNSPECIFIED FRACTURE MORPHOLOGY, INITIAL ENCOUNTER: Primary | ICD-10-CM

## 2025-01-27 DIAGNOSIS — S42.352A CLOSED DISPLACED COMMINUTED FRACTURE OF SHAFT OF LEFT HUMERUS, INITIAL ENCOUNTER: ICD-10-CM

## 2025-01-27 PROBLEM — E44.0 MODERATE MALNUTRITION: Status: ACTIVE | Noted: 2025-01-27

## 2025-01-27 PROBLEM — Z79.4 TYPE 2 DIABETES MELLITUS, WITH LONG-TERM CURRENT USE OF INSULIN: Status: ACTIVE | Noted: 2025-01-27

## 2025-01-27 PROBLEM — E11.9 TYPE 2 DIABETES MELLITUS, WITH LONG-TERM CURRENT USE OF INSULIN: Status: ACTIVE | Noted: 2025-01-27

## 2025-01-27 LAB
ALBUMIN SERPL-MCNC: 3.1 G/DL (ref 3.4–4.8)
ALBUMIN/GLOB SERPL: 0.8 RATIO (ref 1.1–2)
ALP SERPL-CCNC: 69 UNIT/L (ref 40–150)
ALT SERPL-CCNC: 14 UNIT/L (ref 0–55)
ANION GAP SERPL CALC-SCNC: 6 MEQ/L
AST SERPL-CCNC: 17 UNIT/L (ref 5–34)
BASOPHILS # BLD AUTO: 0.06 X10(3)/MCL
BASOPHILS NFR BLD AUTO: 0.6 %
BILIRUB SERPL-MCNC: 0.7 MG/DL
BUN SERPL-MCNC: 22.9 MG/DL (ref 9.8–20.1)
CALCIUM SERPL-MCNC: 9.6 MG/DL (ref 8.4–10.2)
CHLORIDE SERPL-SCNC: 100 MMOL/L (ref 98–107)
CO2 SERPL-SCNC: 29 MMOL/L (ref 23–31)
CORTIS SERPL-SCNC: 21.7 UG/DL
CREAT SERPL-MCNC: 0.79 MG/DL (ref 0.55–1.02)
CREAT/UREA NIT SERPL: 29
EOSINOPHIL # BLD AUTO: 0.29 X10(3)/MCL (ref 0–0.9)
EOSINOPHIL NFR BLD AUTO: 2.9 %
ERYTHROCYTE [DISTWIDTH] IN BLOOD BY AUTOMATED COUNT: 17.1 % (ref 11.5–17)
GFR SERPLBLD CREATININE-BSD FMLA CKD-EPI: >60 ML/MIN/1.73/M2
GLOBULIN SER-MCNC: 3.8 GM/DL (ref 2.4–3.5)
GLUCOSE SERPL-MCNC: 239 MG/DL (ref 82–115)
HCT VFR BLD AUTO: 29.8 % (ref 37–47)
HGB BLD-MCNC: 9.2 G/DL (ref 12–16)
HOLD SPECIMEN: NORMAL
IMM GRANULOCYTES # BLD AUTO: 0.06 X10(3)/MCL (ref 0–0.04)
IMM GRANULOCYTES NFR BLD AUTO: 0.6 %
LYMPHOCYTES # BLD AUTO: 2.93 X10(3)/MCL (ref 0.6–4.6)
LYMPHOCYTES NFR BLD AUTO: 29.7 %
MCH RBC QN AUTO: 24.5 PG (ref 27–31)
MCHC RBC AUTO-ENTMCNC: 30.9 G/DL (ref 33–36)
MCV RBC AUTO: 79.3 FL (ref 80–94)
MONOCYTES # BLD AUTO: 0.85 X10(3)/MCL (ref 0.1–1.3)
MONOCYTES NFR BLD AUTO: 8.6 %
NEUTROPHILS # BLD AUTO: 5.67 X10(3)/MCL (ref 2.1–9.2)
NEUTROPHILS NFR BLD AUTO: 57.6 %
NRBC BLD AUTO-RTO: 0 %
PLATELET # BLD AUTO: 257 X10(3)/MCL (ref 130–400)
PMV BLD AUTO: 9.6 FL (ref 7.4–10.4)
POCT GLUCOSE: 209 MG/DL (ref 70–110)
POCT GLUCOSE: 291 MG/DL (ref 70–110)
POCT GLUCOSE: 334 MG/DL (ref 70–110)
POTASSIUM SERPL-SCNC: 4.2 MMOL/L (ref 3.5–5.1)
PROT SERPL-MCNC: 6.9 GM/DL (ref 5.8–7.6)
RBC # BLD AUTO: 3.76 X10(6)/MCL (ref 4.2–5.4)
SODIUM SERPL-SCNC: 135 MMOL/L (ref 136–145)
SODIUM UR-SCNC: 76 MMOL/L
WBC # BLD AUTO: 9.86 X10(3)/MCL (ref 4.5–11.5)

## 2025-01-27 PROCEDURE — 97110 THERAPEUTIC EXERCISES: CPT

## 2025-01-27 PROCEDURE — 63600175 PHARM REV CODE 636 W HCPCS

## 2025-01-27 PROCEDURE — 97530 THERAPEUTIC ACTIVITIES: CPT

## 2025-01-27 PROCEDURE — 36415 COLL VENOUS BLD VENIPUNCTURE: CPT

## 2025-01-27 PROCEDURE — 25000003 PHARM REV CODE 250: Performed by: INTERNAL MEDICINE

## 2025-01-27 PROCEDURE — 84300 ASSAY OF URINE SODIUM: CPT

## 2025-01-27 PROCEDURE — 25000003 PHARM REV CODE 250

## 2025-01-27 PROCEDURE — 63600175 PHARM REV CODE 636 W HCPCS: Performed by: INTERNAL MEDICINE

## 2025-01-27 PROCEDURE — 85025 COMPLETE CBC W/AUTO DIFF WBC: CPT

## 2025-01-27 PROCEDURE — 11000004 HC SNF PRIVATE

## 2025-01-27 PROCEDURE — 97535 SELF CARE MNGMENT TRAINING: CPT

## 2025-01-27 PROCEDURE — 80053 COMPREHEN METABOLIC PANEL: CPT

## 2025-01-27 PROCEDURE — 82533 TOTAL CORTISOL: CPT

## 2025-01-27 PROCEDURE — 36415 COLL VENOUS BLD VENIPUNCTURE: CPT | Performed by: INTERNAL MEDICINE

## 2025-01-27 RX ORDER — ATORVASTATIN CALCIUM 20 MG/1
20 TABLET, FILM COATED ORAL DAILY
Qty: 90 TABLET | Refills: 3 | Status: ON HOLD | OUTPATIENT
Start: 2025-01-28 | End: 2026-01-28

## 2025-01-27 RX ORDER — KETOROLAC TROMETHAMINE 30 MG/ML
15 INJECTION, SOLUTION INTRAMUSCULAR; INTRAVENOUS EVERY 6 HOURS PRN
Status: DISCONTINUED | OUTPATIENT
Start: 2025-01-27 | End: 2025-01-27

## 2025-01-27 RX ORDER — SODIUM CHLORIDE 0.9 % (FLUSH) 0.9 %
10 SYRINGE (ML) INJECTION EVERY 12 HOURS PRN
Status: DISCONTINUED | OUTPATIENT
Start: 2025-01-27 | End: 2025-02-07 | Stop reason: HOSPADM

## 2025-01-27 RX ORDER — MEMANTINE HYDROCHLORIDE 5 MG/1
10 TABLET ORAL 2 TIMES DAILY
Status: DISCONTINUED | OUTPATIENT
Start: 2025-01-27 | End: 2025-02-07 | Stop reason: HOSPADM

## 2025-01-27 RX ORDER — IBUPROFEN 200 MG
24 TABLET ORAL
Status: DISCONTINUED | OUTPATIENT
Start: 2025-01-27 | End: 2025-01-31

## 2025-01-27 RX ORDER — IBUPROFEN 200 MG
16 TABLET ORAL
Status: DISCONTINUED | OUTPATIENT
Start: 2025-01-27 | End: 2025-01-31

## 2025-01-27 RX ORDER — ACETAMINOPHEN 325 MG/1
650 TABLET ORAL EVERY 6 HOURS PRN
Status: DISCONTINUED | OUTPATIENT
Start: 2025-01-27 | End: 2025-01-28

## 2025-01-27 RX ORDER — LEVOTHYROXINE SODIUM 100 UG/1
100 TABLET ORAL
Qty: 30 TABLET | Refills: 11 | Status: ON HOLD | OUTPATIENT
Start: 2025-01-28 | End: 2026-01-28

## 2025-01-27 RX ORDER — INSULIN ASPART 100 [IU]/ML
5 INJECTION, SOLUTION INTRAVENOUS; SUBCUTANEOUS
Status: DISCONTINUED | OUTPATIENT
Start: 2025-01-27 | End: 2025-01-27 | Stop reason: HOSPADM

## 2025-01-27 RX ORDER — GLUCAGON 1 MG
1 KIT INJECTION
Status: DISCONTINUED | OUTPATIENT
Start: 2025-01-27 | End: 2025-01-31

## 2025-01-27 RX ORDER — ENOXAPARIN SODIUM 100 MG/ML
40 INJECTION SUBCUTANEOUS EVERY 24 HOURS
Status: DISCONTINUED | OUTPATIENT
Start: 2025-01-28 | End: 2025-02-07 | Stop reason: HOSPADM

## 2025-01-27 RX ORDER — HYDROCODONE BITARTRATE AND ACETAMINOPHEN 5; 325 MG/1; MG/1
1 TABLET ORAL ONCE
Status: COMPLETED | OUTPATIENT
Start: 2025-01-27 | End: 2025-01-27

## 2025-01-27 RX ORDER — INSULIN ASPART 100 [IU]/ML
5 INJECTION, SOLUTION INTRAVENOUS; SUBCUTANEOUS
Status: DISCONTINUED | OUTPATIENT
Start: 2025-01-28 | End: 2025-01-31

## 2025-01-27 RX ORDER — HYDROCODONE BITARTRATE AND ACETAMINOPHEN 5; 325 MG/1; MG/1
1 TABLET ORAL EVERY 6 HOURS
Qty: 28 TABLET | Refills: 0 | Status: ON HOLD | OUTPATIENT
Start: 2025-01-27 | End: 2025-02-03

## 2025-01-27 RX ORDER — ALUMINUM HYDROXIDE, MAGNESIUM HYDROXIDE, AND SIMETHICONE 1200; 120; 1200 MG/30ML; MG/30ML; MG/30ML
30 SUSPENSION ORAL
Status: DISCONTINUED | OUTPATIENT
Start: 2025-01-28 | End: 2025-02-07 | Stop reason: HOSPADM

## 2025-01-27 RX ORDER — MORPHINE SULFATE 2 MG/ML
1 INJECTION, SOLUTION INTRAMUSCULAR; INTRAVENOUS ONCE
Status: COMPLETED | OUTPATIENT
Start: 2025-01-27 | End: 2025-01-27

## 2025-01-27 RX ORDER — INSULIN GLARGINE 100 [IU]/ML
17 INJECTION, SOLUTION SUBCUTANEOUS NIGHTLY
Qty: 5.1 ML | Refills: 11 | Status: ON HOLD | OUTPATIENT
Start: 2025-01-27 | End: 2026-01-27

## 2025-01-27 RX ORDER — LEVOTHYROXINE SODIUM 100 UG/1
100 TABLET ORAL
Status: DISCONTINUED | OUTPATIENT
Start: 2025-01-28 | End: 2025-02-07 | Stop reason: HOSPADM

## 2025-01-27 RX ORDER — HYDROCODONE BITARTRATE AND ACETAMINOPHEN 5; 325 MG/1; MG/1
1 TABLET ORAL EVERY 6 HOURS
Status: DISCONTINUED | OUTPATIENT
Start: 2025-01-28 | End: 2025-01-29

## 2025-01-27 RX ORDER — NALOXONE HCL 0.4 MG/ML
0.02 VIAL (ML) INJECTION
Status: DISCONTINUED | OUTPATIENT
Start: 2025-01-27 | End: 2025-02-07 | Stop reason: HOSPADM

## 2025-01-27 RX ORDER — ATORVASTATIN CALCIUM 10 MG/1
20 TABLET, FILM COATED ORAL DAILY
Status: DISCONTINUED | OUTPATIENT
Start: 2025-01-28 | End: 2025-02-07 | Stop reason: HOSPADM

## 2025-01-27 RX ORDER — HYDROCODONE BITARTRATE AND ACETAMINOPHEN 5; 325 MG/1; MG/1
1 TABLET ORAL EVERY 6 HOURS
Status: DISCONTINUED | OUTPATIENT
Start: 2025-01-27 | End: 2025-01-27 | Stop reason: HOSPADM

## 2025-01-27 RX ORDER — MEMANTINE HYDROCHLORIDE 10 MG/1
10 TABLET ORAL 2 TIMES DAILY
Qty: 60 TABLET | Refills: 11 | Status: ON HOLD | OUTPATIENT
Start: 2025-01-27 | End: 2026-01-27

## 2025-01-27 RX ORDER — INSULIN ASPART 100 [IU]/ML
0-5 INJECTION, SOLUTION INTRAVENOUS; SUBCUTANEOUS
Status: DISCONTINUED | OUTPATIENT
Start: 2025-01-27 | End: 2025-01-28

## 2025-01-27 RX ORDER — INSULIN GLARGINE 100 [IU]/ML
17 INJECTION, SOLUTION SUBCUTANEOUS NIGHTLY
Status: DISCONTINUED | OUTPATIENT
Start: 2025-01-27 | End: 2025-01-29

## 2025-01-27 RX ORDER — LISINOPRIL 5 MG/1
5 TABLET ORAL DAILY
Qty: 90 TABLET | Refills: 3 | Status: ON HOLD | OUTPATIENT
Start: 2025-01-28 | End: 2026-01-28

## 2025-01-27 RX ORDER — LISINOPRIL 2.5 MG/1
5 TABLET ORAL DAILY
Status: DISCONTINUED | OUTPATIENT
Start: 2025-01-28 | End: 2025-01-27 | Stop reason: HOSPADM

## 2025-01-27 RX ORDER — INSULIN ASPART 100 [IU]/ML
5 INJECTION, SOLUTION INTRAVENOUS; SUBCUTANEOUS
Qty: 4.5 ML | Refills: 11 | Status: ON HOLD | OUTPATIENT
Start: 2025-01-28 | End: 2026-01-28

## 2025-01-27 RX ORDER — LISINOPRIL 5 MG/1
5 TABLET ORAL DAILY
Status: DISCONTINUED | OUTPATIENT
Start: 2025-01-28 | End: 2025-02-07 | Stop reason: HOSPADM

## 2025-01-27 RX ADMIN — INSULIN ASPART 2 UNITS: 100 INJECTION, SOLUTION INTRAVENOUS; SUBCUTANEOUS at 08:01

## 2025-01-27 RX ADMIN — HYDROCODONE BITARTRATE AND ACETAMINOPHEN 1 TABLET: 5; 325 TABLET ORAL at 05:01

## 2025-01-27 RX ADMIN — ACETAMINOPHEN 650 MG: 325 TABLET, FILM COATED ORAL at 09:01

## 2025-01-27 RX ADMIN — INSULIN ASPART 5 UNITS: 100 INJECTION, SOLUTION INTRAVENOUS; SUBCUTANEOUS at 12:01

## 2025-01-27 RX ADMIN — LEVOTHYROXINE SODIUM 100 MCG: 0.1 TABLET ORAL at 05:01

## 2025-01-27 RX ADMIN — LISINOPRIL 20 MG: 10 TABLET ORAL at 08:01

## 2025-01-27 RX ADMIN — ENOXAPARIN SODIUM 40 MG: 40 INJECTION SUBCUTANEOUS at 05:01

## 2025-01-27 RX ADMIN — MORPHINE SULFATE 1 MG: 2 INJECTION, SOLUTION INTRAMUSCULAR; INTRAVENOUS at 01:01

## 2025-01-27 RX ADMIN — ALUMINUM HYDROXIDE, MAGNESIUM HYDROXIDE, AND DIMETHICONE 30 ML: 200; 20; 200 SUSPENSION ORAL at 05:01

## 2025-01-27 RX ADMIN — INSULIN ASPART 4 UNITS: 100 INJECTION, SOLUTION INTRAVENOUS; SUBCUTANEOUS at 12:01

## 2025-01-27 RX ADMIN — MEMANTINE 10 MG: 5 TABLET ORAL at 11:01

## 2025-01-27 RX ADMIN — INSULIN ASPART 5 UNITS: 100 INJECTION, SOLUTION INTRAVENOUS; SUBCUTANEOUS at 05:01

## 2025-01-27 RX ADMIN — INSULIN ASPART 3 UNITS: 100 INJECTION, SOLUTION INTRAVENOUS; SUBCUTANEOUS at 05:01

## 2025-01-27 RX ADMIN — ACETAMINOPHEN 650 MG: 325 TABLET, FILM COATED ORAL at 11:01

## 2025-01-27 RX ADMIN — ATORVASTATIN CALCIUM 20 MG: 20 TABLET, FILM COATED ORAL at 08:01

## 2025-01-27 RX ADMIN — INSULIN GLARGINE 17 UNITS: 100 INJECTION, SOLUTION SUBCUTANEOUS at 11:01

## 2025-01-27 RX ADMIN — INSULIN ASPART 5 UNITS: 100 INJECTION, SOLUTION INTRAVENOUS; SUBCUTANEOUS at 08:01

## 2025-01-27 RX ADMIN — ALUMINUM HYDROXIDE, MAGNESIUM HYDROXIDE, AND DIMETHICONE 30 ML: 200; 20; 200 SUSPENSION ORAL at 11:01

## 2025-01-27 RX ADMIN — HYDROCODONE BITARTRATE AND ACETAMINOPHEN 1 TABLET: 5; 325 TABLET ORAL at 11:01

## 2025-01-27 RX ADMIN — MEMANTINE HYDROCHLORIDE 10 MG: 5 TABLET ORAL at 08:01

## 2025-01-27 NOTE — PT/OT/SLP PROGRESS
"Occupational Therapy   Treatment    Name: Brittany Nunn  MRN: [id number]  Admitting Diagnosis:    Orthostatic hypotension     Fall  Closed displaced comminuted fracture of shaft of right humerus, initial encounter (Primary)  Syncope, unspecified syncope type  Fall from standing, initial encounter  Recommendations:     Discharge Recommendations: Moderate Intensity Therapy  Discharge Equipment Recommendations:  other (see comments) (AD for mobility TBD pending progress; Pt unable to utilize rollator due to R UE in sling)  Barriers to discharge:  Decreased caregiver support, Other (Comment) (level of skilled assist, decreased endurance , pain)    Assessment:     Brittany Nunn is a 89 y.o. female with a medical diagnosis of  fall and Closed displaced comminuted fracture of shaft of right humerus, initial encounter (Primary), Orthostatic hypotension.  She presents with no c/o of light headedness and dizziness during session. Pt affect flat and increased lethargy and  ongoing c/o of generalized weakness and pain R arm which increased with mobility and thus pt requiring max encouragement to get OOB and ambulate to toilet. Due to increased pain R arm, pt requesting increased assist for basic self care tasks and reporting " I just can't brush my hair and teeth- I hurt too much".  Pt denying recommendation to sit up in chair due to increased pain R UE. Pt also c/o of sling " hurting her neck and back" when seated EOB and with OOB mobility. Overall decline since initial eval 1/24/25. Performance deficits affecting function are weakness, impaired endurance, impaired self care skills, impaired functional mobility, gait instability, impaired balance, decreased upper extremity function, pain, decreased ROM, impaired coordination, edema, orthopedic precautions.     Rehab Prognosis:  Fair; patient would benefit from acute skilled OT services to address these deficits and reach maximum level of function.       Plan:     Patient to " be seen 3 x/week to address the above listed problems via self-care/home management, therapeutic activities, therapeutic exercises  Plan of Care Expires:  (d/c)  Plan of Care Reviewed with: patient    Subjective     Chief Complaint: generalized weakness and pain R arm   Patient/Family Comments/goals: decrease pain so she can get stronger  Pain/Comfort:  Pain Rating 1: 6/10  Location - Side 1: Right  Location 1: arm  Pain Addressed 1: Nurse notified, Reposition, Cessation of Activity  Pain Rating Post-Intervention 1: 9/10    Objective:     Communicated with: Nurse Ahn prior to session.  Patient found supine with PureWick, bed alarm and UE sling upon OT entry to room.    General Precautions: Standard, fall, hearing impaired (pt has hearing aides)    Orthopedic Precautions:RUE non weight bearing  Braces: UE Sling  Respiratory Status: Room air     Occupational Performance:     Bed Mobility:  assist level impacted by pain R UE   Patient completed Rolling/Turning to Left with  moderate assistance  Patient completed Supine to Sit with moderate assistance  Patient completed Sit to Supine with moderate assistance     Functional Mobility/Transfers:  Patient completed Sit <> Stand Transfer with minimum assistance  with  hand-held assist   Patient completed Toilet Transfer Step Transfer technique with minimum assistance with  hand-held assist and grab bars  Functional Mobility: Pt ambulated with hand held assist and min A balance  ~ 10 feet bed>toilet and 10 feet toilet >bed; short step length and unsteady gait and c/o of increased R UE pain (R UE in UE sling)    Activities of Daily Living:  Grooming: while seated EOB (pt denied grooming standing at sink) and with max encouragement and after s/u with tooth brush with toothpaste, pt brushed teeth , brushed hair and wiped face with washcloth.    Lower Body Dressing: maximal assistance threading brief over feet and total assist pulling over hips  and max encouragement and poor  attempt  ; total assist don socks while seated EOB   Toileting: total assistance brief management in standing ; Purewick and soiled brief removed-pt requested toilet but did not void after sitting ~ 5 min .     Treatment & Education:  Pt. educated on OT goals, POC, orientation to environment, use of call bell for assist with transfers OOB or for any other needs due to fall risk.     While seated EOB and with RUE in sling, Pt peformed 2 x 10 reps R UE forearm supination , wrist extension and composite grasp with minimal AROM achieved ; issued resistive ball and pt able to grasp ball and apply min resistance 2 x 5 reps. Resistive ball left at bedside for pt use 1 x 5-10 reps multiple times per day.     Patient left left sidelying with R UE sling and humerus in neutral position  and  call button in reach, bed alarm on, and nurse  notified    GOALS:   Multidisciplinary Problems       Occupational Therapy Goals          Problem: Occupational Therapy    Goal Priority Disciplines Outcome Interventions   Occupational Therapy Goal     OT, PT/OT Not Progressing    Description: Goals to be met by: d/c     Patient will increase functional independence with ADLs by performing:    UE Dressing with Stand-by Assistance .  LE Dressing with Modified [place].  Grooming while standing at sink with Modified [place].  Toileting from raised toilet with Modified [place] for hygiene and clothing management.   Toilet transfer to toilet with Modified [place].  Pt will ambulate short distances during self care routine >20 feet with LRAD and supervision.                          Time Tracking:     OT Date of Treatment: 01/27/25  OT Start Time: 1009  OT Stop Time: 1038  OT Total Time (min): 29 min    Billable Minutes:Self Care/Home Management 29 min     OT/EVELYN: OT          1/27/2025

## 2025-01-27 NOTE — PLAN OF CARE
Problem: Adult Inpatient Plan of Care  Goal: Plan of Care Review  Outcome: Progressing  Goal: Optimal Comfort and Wellbeing  Outcome: Progressing     Problem: Pain Acute  Goal: Optimal Pain Control and Function  Outcome: Progressing     Problem: Orthopaedic Fracture  Goal: Bowel Elimination  Outcome: Progressing  Goal: Fracture Stability  Outcome: Progressing     Problem: Fall Injury Risk  Goal: Absence of Fall and Fall-Related Injury  Outcome: Progressing

## 2025-01-27 NOTE — PROGRESS NOTES
[facility] Medicine Wards   Progress Note     Resident Team: [facility] Medicine List 3  Attending Physician: Neil Mendoza MD  Resident: Kirit Renteria DO   Intern: DO Ramiro   Date of Admit: 1/21/2025    SUBJECTIVE      Brief HPI:  Brittany Nunn is a 89 y.o. female with past medical history of CVA/TIA, Alzheimer's, hyperlipidemia, GERD, hypothyroidism, hypertension, ovarian cancer s/p resection (refused chemotherapy after 1 session following procedure) and diabetes on insulin.  She presented to [facility] on 1/21/2025  with a primary complaint of fall on right shoulder.  Patient reports that she was distracted and slipped over her shoe and fell on her right shoulder.  She reported pain in that shoulder prior to the fall that had been going on for weeks.  She was unable to get up off the ground until EMS arrived and helped her up.  Patient's history deviates from this point from EMS accounts, likely secondary to history of Alzheimer's.  EMS reported that when patient was picked up off the ground she became hypotensive and bradycardic, she felt nauseated and subsequently vomited 1 time.  Following this episode patient loss consciousness for a few moments.  Patient and EMS agree there was no trauma to her head.  Patient lives at home with 91-year-old  and AIDS and taking care of him.  She also lives with her daughter-in-law who has a complicated drug his in his unable to confidently provide her with support necessary to take care of her following this trauma. Patient reports pain in her right shoulder.  Patient denies weakness, dizziness, and loss of consciousness.  Patient denies any tobacco, recreational drug use, or alcohol history.     ED course:  Patient presented afebrile, vital signs stable, saturating 97% on room air.  CBC significant for leukocytosis WBC 14.81, microcytic anemia H/H 9.8/31.6.  CMP significant for BUN/creatinine 23.9/0.91, otherwise unremarkable.  CT head without contrast showed Periventricular  and subcortical white matter changes and cerebral atrophy most compatible with chronic small vessel ischemic disease.  X-ray and CT arm of the right shoulder shows impacted proximal humerus fracture.  ED physician consulted Orthopedic surgery, determined patient requires no surgical intervention at this time.  She will likely need to follow up 10 days following discharge with Orthopedic surgery.  Internal medicine was consulted for orthostatic hypotension and right shoulder fracture.    Interval History:  No acute events overnight.  Swelling and erythema around the wrists appears to be improved relative to demarcation day prior.  Tolerating PT OT well.  Sniff placement pending orthopedic consult.    Review of Systems:  12 point review of symptoms negative unless otherwise stated above        ASSESSMENT & PLAN     Orthostatic hypotension vs Vasovagal syncope  Dehydration  -EMS reported drop in blood pressure and heart rate prior to loss of consciousness  -patient received 1 L LR in the ED in 1 L on the floor.  -troponin negative     Microcytic anemia  Iron-deficiency anemia  -patient has prior history of iron infusions in 2015, reports she only did this for a short period of time.  Reports she is unsure what caused her iron to be low at that time.  -iron studies show low iron 26, low iron saturation 8, normal total iron binding capacity, normal ferritin.  -repleted with Ferrlecit x2     Alzheimer's vs vascular dementia  -CT head without contrast showed Periventricular and subcortical white matter changes and cerebral atrophy most compatible with chronic small vessel ischemic disease.  -continue home dose memantine 10 mg b.i.d.     Diabetes  -holding home meds  -increased to Lantus 18 units q.h.s. and low-dose sliding scale     Right shoulder fracture  -X-ray and CT arm of the right shoulder shows impacted proximal humerus fracture  -E consult to Orthopedic surgery, recommended no surgical intervention at this time.   Follow up in 10 days following discharge.  -[facility] seeing patient today and formalizing recommendation; assistance appreciated.   -p.r.n. pain meds      Hypothyroidism  -continue home dose levothyroxine 100 mcg q.d.  -TSH and free T4 WNL     Hypertension  Hyperlipidemia  -continue home meds:  Atorvastatin 20 mg q.d., lisinopril 20 mg q.d.     Swelling of left wrist  Elevated WBC - improved   -demarcated with a marker  -if continues to spread or worsen consider adding antibiotics for cellulitis coverage  -advise heating pack      CODE STATUS:  Full  Access:  PIV  Antibiotics:  None  Diet:  DM diet  DVT Prophylaxis:  LVX   GI Prophylaxis:  Famotidine/Carafate   Fluids:  None    Disposition:  Brittany Nunn is a 89 y.o. female who is admitted for orthostatic hypotension with right proximal humerus fracture.  Discharge pending SNF placement.     OBJECTIVE          Vital Signs (Most Recent):  Temp: 97.7 °F (36.5 °C) (01/27/25 1150)  Pulse: 92 (01/27/25 1150)  Resp: 18 (01/27/25 1324)  BP: 95/61 (01/27/25 1150)  SpO2: (!) 94 % (01/27/25 1150) Vital Signs (24h Range):  Temp:  [97.7 °F (36.5 °C)-99 °F (37.2 °C)] 97.7 °F (36.5 °C)  Pulse:  [] 92  Resp:  [16-18] 18  SpO2:  [94 %-99 %] 94 %  BP: ()/(58-66) 95/61      Physical Exam:  GEN: A&Ox4. No acute distress   HEENT: normocephalic, atraumatic. EMOI bilaterally. Sclera, conjunctiva clear. Nares patents. Oropharyngeal mucosa moist, non-erythematous, non-edematous. Trachea midline  CARDIAC: S1 S2, no MRG. Radial pulses full, no LE edema   RESPI: Chest wall rise symmetric, atraumatic. Lungs CTAB, no wheezing or rhonchi   ABDOMEN: soft, nontender, BS present in all 4 quadrants. No herniation, masses, HSM  MSK: R shoulder ecchymoses; arm in sling; left wrist swelling, not worsening   NEURO: Motor and sensation grossly intact. CN grossly intact. No cerebellar deficits noted on cursory exam  PSYCH: Memory and thought process appear intact. Mood and affect  appropriate       Laboratory    CBC  Recent Labs   Lab 01/25/25  0352 01/26/25  0408 01/27/25  0503   WBC 9.87 12.17* 9.86   RBC 3.61* 3.47* 3.76*   HGB 8.8* 8.5* 9.2*   HCT 28.7* 28.2* 29.8*   MCV 79.5* 81.3 79.3*   MCH 24.4* 24.5* 24.5*   MCHC 30.7* 30.1* 30.9*   RDW 15.6 16.2 17.1*    246 257   MPV 9.6 9.6 9.6       CMP   Recent Labs   Lab 01/21/25  1541 01/22/25  0333 01/25/25  0352 01/26/25  0408 01/27/25  0503      < > 138 134* 135*   K 4.2   < > 4.2 4.5 4.2   CO2 23   < > 26 27 29   BUN 23.9*   < > 24.3* 23.5* 22.9*   CREATININE 0.91   < > 0.75 0.98 0.79   CALCIUM 9.9   < > 9.6 9.3 9.6   MG 1.90  --   --   --   --    ALBUMIN 3.7   < > 3.3* 3.1* 3.1*   ALKPHOS 58   < > 64 65 69   ALT 12   < > 10 11 14   AST 19   < > 16 17 17   BILITOT 0.4   < > 0.6 0.6 0.7    < > = values in this interval not displayed.        Microbiology:  Microbiology Results (last 7 days)       ** No results found for the last 168 hours. **            Cardiac Data:  No echocardiogram results found for the past 14 days.    Results for orders placed or performed during the hospital encounter of 01/21/25   EKG 12-lead    Collection Time: 01/21/25  3:26 PM   Result Value Ref Range    QRS Duration 122 ms    OHS QTC Calculation 526 ms    Narrative    Test Reason : W19.XXXA,    Vent. Rate : 100 BPM     Atrial Rate : 100 BPM     P-R Int : 172 ms          QRS Dur : 122 ms      QT Int : 408 ms       P-R-T Axes :  66 -50  23 degrees    QTcB Int : 526 ms    Sinus rhythm with Premature atrial complexes  Right bundle branch block  Left anterior fascicular block   Bifascicular block   Abnormal ECG    Confirmed by Edavettal, Donte ([id number]) on 1/24/2025 11:18:13 AM    Referred By: LAZREFERRAL SELF           Confirmed By: Donte Garcia        Radiology:  Imaging Results              CT Arm (Humerus) Without Contrast Right (Final result)  Result time 01/21/25 15:19:29      Final result by Yaneli Lemus MD (01/21/25 15:19:29)                    Impression:      Impacted fracture of the proximal humerus      Electronically signed by: Yaneli Lemus  Date:    01/21/2025  Time:    15:19               Narrative:    EXAMINATION:  CT ARM (HUMERUS) WITHOUT CONTRAST RIGHT    CLINICAL HISTORY:  arm fx;    TECHNIQUE:  Helically acquired imaging through the right shoulder were obtained without the IV administration of contrast. Axial, sagittal and coronal reformations were created and interpreted.    Automated tube current modulation, weight-based exposure dosing, and/or iterative reconstruction technique utilized to reach lowest reasonably achievable exposure rate.    DLP: 332 mGy*cm    COMPARISON:  Plain radiograph right shoulder 01/21/2025    FINDINGS:  BONES/JOINTS: Comminuted, impacted fracture of the humeral head/neck and greater tuberosity.  There is a shoulder joint effusion with mild subluxation.  Degenerative change at the acromioclavicular joint.  Subtle cortical contour deformity at right 3rd and 4th ribs anterolaterally.    SOFT TISSUES: Soft tissue swelling.    OTHER: N/A                                       CT Head Without Contrast (Final result)  Result time 01/21/25 15:06:40      Final result by Yaneli Lemus MD (01/21/25 15:06:40)                   Impression:      No appreciable acute intracranial abnormality.    Periventricular and subcortical white matter changes and cerebral atrophy most compatible with chronic small vessel ischemic disease.      Electronically signed by: Yaneli Lemus  Date:    01/21/2025  Time:    15:06               Narrative:    EXAMINATION:  CT HEAD WITHOUT CONTRAST    CLINICAL HISTORY:  fall, old;    TECHNIQUE:  Low dose axial CT images obtained throughout the head without intravenous contrast.  Axial, sagittal and coronal reconstructions were performed and interpreted.    DLP: 862 mGycm    All CT scans at this location are performed using dose optimization techniques as appropriate to a performed exam  including the following automated exposure control, adjustment of the mA and/or kV according to patient size and/or use of iterative reconstruction technique    COMPARISON:  No relevant prior available for comparison.    FINDINGS:  BRAIN: Gray white differentiation is maintained. Periventricular and subcortical white matter changes most compatible with chronic small vessel ischemic disease.  Moderate cerebral atrophy.  No hemorrhage. No edema. No mass effect or midline shift.  The posterior fossa and midline structures are unremarkable.    VENTRICLES: Ex vacuo dilatation of the ventricles.    EXTRA-AXIAL: No abnormal extra-axial collections.    BONES: Calvarium is intact.    SINUSES AND MASTOIDS: Mucoperiosteal thickening at the right maxillary sinus.                                       X-Ray Shoulder Complete 2 View Right (Final result)  Result time 01/21/25 14:49:41      Final result by Yaneli Lemus MD (01/21/25 14:49:41)                   Impression:      Proximal humerus fracture.      Electronically signed by: Yaneli Lemus  Date:    01/21/2025  Time:    14:49               Narrative:    EXAMINATION:  XR SHOULDER COMPLETE 2 OR MORE VIEWS RIGHT    CLINICAL HISTORY:  Unspecified fall, initial encounter    TECHNIQUE:  Three views of the right shoulder were performed.    COMPARISON  None    FINDINGS:  BONES: Impacted fracture at the humeral head/neck with involvement of the greater tuberosity.  Inferior subluxation of the humeral head.    SOFT TISSUES:  Regional soft tissues are normal.                                      Current Medications:     Infusions:       Scheduled:   aluminum-magnesium hydroxide-simethicone  30 mL Oral QID (AC & HS)    atorvastatin  20 mg Oral Daily    enoxparin  40 mg Subcutaneous Daily    HYDROcodone-acetaminophen  1 tablet Oral Q6H    insulin aspart U-100  5 Units Subcutaneous TIDWM    insulin glargine U-100  17 Units Subcutaneous QHS    levothyroxine  100 mcg Oral Before  breakfast    [START ON 1/28/2025] lisinopriL  5 mg Oral Daily    memantine  10 mg Oral BID        PRN:    Current Facility-Administered Medications:     acetaminophen, 650 mg, Oral, Q6H PRN    dextrose 50%, 12.5 g, Intravenous, PRN    dextrose 50%, 25 g, Intravenous, PRN    glucagon (human recombinant), 1 mg, Intramuscular, PRN    glucose, 16 g, Oral, PRN    glucose, 24 g, Oral, PRN    insulin aspart U-100, 0-5 Units, Subcutaneous, QID (AC + HS) PRN    ketorolac, 15 mg, Intravenous, Q6H PRN    naloxone, 0.02 mg, Intravenous, PRN    sodium chloride 0.9%, 10 mL, Intravenous, Q12H PRN        Intake/Output Summary (Last 24 hours) at 1/27/2025 1502  Last data filed at 1/27/2025 0943  Gross per 24 hour   Intake 714 ml   Output 1200 ml   Net -486 ml       Lines/Drains/Airways       Peripheral Intravenous Line  Duration                  Peripheral IV - Single Lumen 01/27/25 1315 22 G Left;Posterior Hand <1 day                      Kirit Renteria DO  Internal Medicine - PGY-2

## 2025-01-27 NOTE — PROGRESS NOTES
Inpatient Nutrition Assessment    Admit Date: 1/21/2025   Total duration of encounter: 6 days   Patient Age: 89 y.o.    Nutrition Recommendation/Prescription     Carb consistent (75 gm/meal) diet, meal assistance as needed  Boost Glucose Control (provides 190 kcal, 16 g protein per serving) TID  Suggest Daily MVI with Fe  Monitor Weight BiWeekly     Communication of Recommendations: reviewed with nurse and reviewed with patient    Nutrition Assessment     Malnutrition Assessment/Nutrition-Focused Physical Exam    Malnutrition Context: acute illness or injury (01/27/25 1249)  Malnutrition Level: moderate (01/27/25 1249)  Energy Intake (Malnutrition): less than or equal to 50% for greater than or equal to 5 days (01/27/25 1249)  Weight Loss (Malnutrition): other (see comments) (Unable to assess) (01/27/25 1249)  Subcutaneous Fat (Malnutrition): mild depletion (01/27/25 1249)  Orbital Region (Subcutaneous Fat Loss): mild depletion        Muscle Mass (Malnutrition): mild depletion (01/27/25 1249)  [place] Region (Muscle Loss): mild depletion                       Fluid Accumulation (Malnutrition): other (see comments) (Not present) (01/27/25 1249)     Hand  Strength, Right (Malnutrition): Unable to assess (01/27/25 1249)  A minimum of two characteristics is recommended for diagnosis of either severe or non-severe malnutrition.    Chart Review    Reason Seen: length of stay    Malnutrition Screening Tool Results   Have you recently lost weight without trying?: No  Have you been eating poorly because of a decreased appetite?: No   MST Score: 0   Diagnosis:  Orthostatic hypotension vs vasovagal syncope, Dehydration, Microcytic anemia, RENEE, DM, Alzheimer's vs vascular dementia, DM, Right shoulder fracture, Hypothyroidism, HTN, HLD, Swelling of left writsh    Relevant Medical History: CVA/TIA, Alzheimer's, HLD, GERD, Hypothyroidism, HTN, Ovarian cancer, DM    Scheduled Medications:  aluminum-magnesium  hydroxide-simethicone, 30 mL, QID (AC & HS)  atorvastatin, 20 mg, Daily  enoxparin, 40 mg, Daily  HYDROcodone-acetaminophen, 1 tablet, Q6H  insulin aspart U-100, 5 Units, TIDWM  insulin glargine U-100, 17 Units, QHS  levothyroxine, 100 mcg, Before breakfast  [START ON 1/28/2025] lisinopriL, 5 mg, Daily  memantine, 10 mg, BID  morphine, 1 mg, Once    Continuous Infusions:   PRN Medications:  acetaminophen, 650 mg, Q6H PRN  dextrose 50%, 12.5 g, PRN  dextrose 50%, 25 g, PRN  glucagon (human recombinant), 1 mg, PRN  glucose, 16 g, PRN  glucose, 24 g, PRN  insulin aspart U-100, 0-5 Units, QID (AC + HS) PRN  ketorolac, 15 mg, Q6H PRN  naloxone, 0.02 mg, PRN  sodium chloride 0.9%, 10 mL, Q12H PRN    Calorie Containing IV Medications: no significant kcals from medications at this time    Recent Labs   Lab 01/21/25  1425 01/21/25  1541 01/22/25  0333 01/23/25  0313 01/24/25  0351 01/25/25  0352 01/26/25  0408 01/27/25  0503   NA  --  138 138 142 142 138 134* 135*   K  --  4.2 3.7 3.9 4.2 4.2 4.5 4.2   CALCIUM  --  9.9 9.4 9.3 9.5 9.6 9.3 9.6   MG  --  1.90  --   --   --   --   --   --    CL  --  105 104 106 106 103 101 100   CO2  --  23 26 27 26 26 27 29   BUN  --  23.9* 18.3 16.7 21.1* 24.3* 23.5* 22.9*   CREATININE  --  0.91 0.72 0.74 0.79 0.75 0.98 0.79   EGFRNORACEVR  --  >60 >60 >60 >60 >60 56 >60   GLUCOSE  --  255* 107 105 170* 230* 289* 239*   BILITOT  --  0.4 0.7 0.4 0.4 0.6 0.6 0.7   ALKPHOS  --  58 53 59 62 64 65 69   ALT  --  12 11 10 10 10 11 14   AST  --  19 18 17 17 16 17 17   ALBUMIN  --  3.7 3.4 3.2* 3.2* 3.3* 3.1* 3.1*   WBC 14.81*  --  10.87 8.32 8.90 9.87 12.17* 9.86   HGB 9.8*  --  8.8* 8.7* 8.7* 8.8* 8.5* 9.2*   HCT 31.6*  --  28.5* 28.7* 28.0* 28.7* 28.2* 29.8*     Nutrition Orders:  Diet diabetic 2000 Calories (up to 75 gm per meal)  Dietary nutrition supplements TID; Boost Glucose Control - Any flavor    Appetite/Oral Intake: poor/25-50% of meals  Factors Affecting Nutritional Intake: decreased  "appetite  Social Needs Impacting Access to Food: none identified  Food/Shinto/Cultural Preferences: none reported  Food Allergies: no known food allergies  Last Bowel Movement: 25  Wound(s):  skin intact    Comments    25 -- Pt with decreased po intake since admit; report only able to eat light foods requesting soup for lunch, drinking Boost as ordered; nursing assisting pt with cutting of foods/meats as needed; denies n/v; H/H (L)  --  suggest MVI with Fe; Glu (H) - h/o DM, continue carb consistent diet as ordered; SNF pending for discharge; new weight obtained vis bed scale indicates wt loss since admit, however pt reports UBW as 148 lb, ? Accuracy as pt's UBW appears to be ~ 155 lb per EMR weight history however history limited, will continue to monitor    Anthropometrics    Height: 5' 5.5" (166.4 cm), Height Method: Stated  Last Weight: 67.5 kg (148 lb 13 oz) (25 1246), Weight Method: Bed Scale  BMI (Calculated): 24.4  BMI Classification: normal (BMI 18.5-24.9)     Ideal Body Weight (IBW), Female: 127.5 lb     % Ideal Body Weight, Female (lb): 122.35 %                    Usual Body Weight (UBW), k.27 kg  % Usual Body Weight: 100.55  % Weight Change From Usual Weight: 0.34 %  Usual Weight Provided By: patient and EMR weight history    Wt Readings from Last 5 Encounters:   25 67.5 kg (148 lb 13 oz)   19 70.5 kg (155 lb 6.8 oz)     Weight Change(s) Since Admission: 8 lb weight loss indicated, will continue to monitor for accuracy as pt reports UBW as 148 lb  Wt Readings from Last 1 Encounters:   25 1246 67.5 kg (148 lb 13 oz)   25 1403 70.8 kg (156 lb)   Admit Weight: 70.8 kg (156 lb) (25 1403), Weight Method: Bed Scale    Estimated Needs    Weight Used For Calorie Calculations: 67.5 kg (148 lb 13 oz)  Energy Calorie Requirements (kcal): 6095-3194 kcal (28 - 30 kcal/kg)  Energy Need Method: Kcal/kg  Weight Used For Protein Calculations: 67.5 kg (148 lb 13 " oz)  Protein Requirements: 68-81 gm (1 - 1.2 gm/kg)  Fluid Requirements (mL): 3069-7912 ml (1ml/kcal)  CHO Requirement: 200 gm daily     Enteral Nutrition     Patient not receiving enteral nutrition at this time.    Parenteral Nutrition     Patient not receiving parenteral nutrition support at this time.    Evaluation of Received Nutrient Intake    Calories: not meeting estimated needs  Protein: not meeting estimated needs    Patient Education     Not applicable.    Nutrition Diagnosis     PES: Inadequate oral intake related to acute illness as evidenced by <50% nutrition intake x 5 days. (new)     PES: Moderate acute disease or injury related malnutrition Related to acute illness As Evidenced by:  - weight loss: Unable to assess - energy intake: <= 50% for 5 days (meets criteria for <= 50% >= 5 days (severe - acute)) - muscle mass depletion: 1 area of mild muscle loss (Temporalis) - loss of subcutaneous fat: 1 area of mild fat loss (Infraorbital) new    Nutrition Interventions     Intervention(s): Care coordination or referral;Oral diet/nutrient modifications;Oral nutritional supplement    Goal: Meet greater than 80% of nutritional needs by follow-up. (new)  Goal: Maintain weight throughout hospitalization. (new)    Nutrition Goals & Monitoring     Dietitian will monitor: food and beverage intake, weight, electrolyte/renal panel, and glucose/endocrine profile  Discharge planning: continue carb consistent diet with boost glucose control oral supplements  Nutrition Risk/Follow-Up: moderate (follow-up in 3-5 days)   Please consult if re-assessment needed sooner.

## 2025-01-27 NOTE — PLAN OF CARE
01/27/25 1410   Medicare Message   Important Message from Medicare regarding Discharge Appeal Rights Given to patient/caregiver;Explained to patient/caregiver;Signed/date by patient/caregiver   Date IMM was signed 01/27/25   Time IMM was signed [id number]

## 2025-01-27 NOTE — PLAN OF CARE
Received message from Lynette with [facility] stating that patient has been accepted, they have received insurance authorization, and are able to accept patient today under the care of Dr. Jordon Noble. Dr. Renteria has been updated.     Spoke to patient's daughter, Shabnam Patel, P: [phone], stating that she, patient, and patient's  are in agreement with patient going to [facility]. Shabnam expressed concern about orthopedic follow up. Dr. Haines has been updated. There is not a note from orthopedics in patient's chart at this time.    Dr. Renteria has spoken to orthopedic clinic for an orthopedic referral prior to DC. Will hold DC until this is done.     Nurse for report is Thor, charge nurse, P: [phone].    Patient is in will call with [facility], P: [phone].    Patient and daughter, Shabnam, have been updated that DC is on hold until after orthopedics sees patient.    All information has been provided to patient's nurse, Anabelle.     Lynette with [facility] has been updated, also. Ok to admit patient tomorrow if necessary.

## 2025-01-27 NOTE — DISCHARGE SUMMARY
[facility] Discharge Summary    Admitting Physician: Neil Mendoza MD  Attending Physician: Neil Mendoza MD  Date of Admit: 1/21/2025  Date of Discharge: 1/27/2025    Condition: Stable  Outcome: Condition has improved and patient is now ready for discharge.  DISPOSITION: Skilled Nursing Facility    Discharge Diagnoses     Patient Active Problem List   Diagnosis    Orthostatic hypotension    Moderate malnutrition       Principal Problem:  Orthostatic hypotension    Consultants and Procedures     Consultants:  Consults (From admission, onward)          Status Ordering Provider     Inpatient consult to Social Work/Case Management  Once        Provider:  (Not yet assigned)    Completed RIMMA KUMAR     Inpatient consult to Internal Medicine  Once        Provider:  Rimma Kumar DO Acknowledged LONG, DAVID H             Procedures:   * No surgery found *     Brief History of Present Illness      Brittany Nunn is a 89 y.o. female with past medical history of CVA/TIA, Alzheimer's, hyperlipidemia, GERD, hypothyroidism, hypertension, ovarian cancer s/p resection (refused chemotherapy after 1 session following procedure) and diabetes on insulin.  She presented to [facility] on 1/21/2025  with a primary complaint of fall on right shoulder.  Patient reports that she was distracted and slipped over her shoe and fell on her right shoulder.  She reported pain in that shoulder prior to the fall that had been going on for weeks.  She was unable to get up off the ground until EMS arrived and helped her up.  Patient's history deviates from this point from EMS accounts, likely secondary to history of Alzheimer's.  EMS reported that when patient was picked up off the ground she became hypotensive and bradycardic, she felt nauseated and subsequently vomited 1 time.  Following this episode patient loss consciousness for a few moments.  Patient and EMS agree there was no trauma to her head.  Patient lives at home  with 91-year-old  and AIDS and taking care of him.  She also lives with her daughter-in-law who has a complicated drug his in his unable to confidently provide her with support necessary to take care of her following this trauma. Patient reports pain in her right shoulder.  Patient denies weakness, dizziness, and loss of consciousness.  Patient denies any tobacco, recreational drug use, or alcohol history.      Hospital Course with Pertinent Findings     ED course:  Patient presented afebrile, vital signs stable, saturating 97% on room air.  CBC significant for leukocytosis WBC 14.81, microcytic anemia H/H 9.8/31.6.  CMP significant for BUN/creatinine 23.9/0.91, otherwise unremarkable.  CT head without contrast showed Periventricular and subcortical white matter changes and cerebral atrophy most compatible with chronic small vessel ischemic disease.  X-ray and CT arm of the right shoulder shows impacted proximal humerus fracture.  ED physician consulted Orthopedic surgery, determined patient requires no surgical intervention at this time.  She will likely need to follow up 10 days following discharge with Orthopedic surgery.     Patient determined to be experiencing vasovagal syncope secondary to dehydration. She received ferrlecit for RENEE. Lantus was increased to 18 units qhs, with 5 units of aspart TID WM added as well.  R shoulder fxr was evaluated by ortho; no acute surgical intervention at this time, follow up in clinic. Referral sent. Patient will be sent out with 7 days of Norco 5 mg q6hr for pain. Patient currently stable for discharge to SNF.        Lab Results   Component Value Date     (L) 01/27/2025    K 4.2 01/27/2025     01/27/2025    CO2 29 01/27/2025     (H) 12/12/2023    BUN 22.9 (H) 01/27/2025    CREATININE 0.79 01/27/2025    CALCIUM 9.6 01/27/2025    BILIDIR 0.10 05/13/2019    IBILI 0.30 05/13/2019    ALKPHOS 69 01/27/2025    AST 17 01/27/2025    ALT 14 01/27/2025    MG  1.90 01/21/2025        Lab Results   Component Value Date    WBC 9.86 01/27/2025    RBC 3.76 (L) 01/27/2025    HGB 9.2 (L) 01/27/2025    HCT 29.8 (L) 01/27/2025    MCV 79.3 (L) 01/27/2025    MCH 24.5 (L) 01/27/2025    MCHC 30.9 (L) 01/27/2025    RDW 17.1 (H) 01/27/2025     01/27/2025    MPV 9.6 01/27/2025    EOS 0.3 06/19/2024    BASO 0.1 06/19/2024    EOSINOPHIL 3 06/19/2024         Microbiology Data:  Microbiology Results (last 7 days)       ** No results found for the last 168 hours. **            Cardiac Data:  No echocardiogram results found for the past 14 days.    Results for orders placed or performed during the hospital encounter of 01/21/25   EKG 12-lead    Collection Time: 01/21/25  3:26 PM   Result Value Ref Range    QRS Duration 122 ms    OHS QTC Calculation 526 ms    Narrative    Test Reason : W19.XXXA,    Vent. Rate : 100 BPM     Atrial Rate : 100 BPM     P-R Int : 172 ms          QRS Dur : 122 ms      QT Int : 408 ms       P-R-T Axes :  66 -50  23 degrees    QTcB Int : 526 ms    Sinus rhythm with Premature atrial complexes  Right bundle branch block  Left anterior fascicular block   Bifascicular block   Abnormal ECG    Confirmed by Donte Garcia ([id number]) on 1/24/2025 11:18:13 AM    Referred By: LAZREFERRAL SELF           Confirmed By: Donte Garcia        Radiology:  Imaging Results              CT Arm (Humerus) Without Contrast Right (Final result)  Result time 01/21/25 15:19:29      Final result by Yaneli Lemus MD (01/21/25 15:19:29)                   Impression:      Impacted fracture of the proximal humerus      Electronically signed by: Yaneli Lemus  Date:    01/21/2025  Time:    15:19               Narrative:    EXAMINATION:  CT ARM (HUMERUS) WITHOUT CONTRAST RIGHT    CLINICAL HISTORY:  arm fx;    TECHNIQUE:  Helically acquired imaging through the right shoulder were obtained without the IV administration of contrast. Axial, sagittal and coronal reformations were created  and interpreted.    Automated tube current modulation, weight-based exposure dosing, and/or iterative reconstruction technique utilized to reach lowest reasonably achievable exposure rate.    DLP: 332 mGy*cm    COMPARISON:  Plain radiograph right shoulder 01/21/2025    FINDINGS:  BONES/JOINTS: Comminuted, impacted fracture of the humeral head/neck and greater tuberosity.  There is a shoulder joint effusion with mild subluxation.  Degenerative change at the acromioclavicular joint.  Subtle cortical contour deformity at right 3rd and 4th ribs anterolaterally.    SOFT TISSUES: Soft tissue swelling.    OTHER: N/A                                       CT Head Without Contrast (Final result)  Result time 01/21/25 15:06:40      Final result by Yaneli Lemus MD (01/21/25 15:06:40)                   Impression:      No appreciable acute intracranial abnormality.    Periventricular and subcortical white matter changes and cerebral atrophy most compatible with chronic small vessel ischemic disease.      Electronically signed by: Yaneli Lemus  Date:    01/21/2025  Time:    15:06               Narrative:    EXAMINATION:  CT HEAD WITHOUT CONTRAST    CLINICAL HISTORY:  fall, old;    TECHNIQUE:  Low dose axial CT images obtained throughout the head without intravenous contrast.  Axial, sagittal and coronal reconstructions were performed and interpreted.    DLP: 862 mGycm    All CT scans at this location are performed using dose optimization techniques as appropriate to a performed exam including the following automated exposure control, adjustment of the mA and/or kV according to patient size and/or use of iterative reconstruction technique    COMPARISON:  No relevant prior available for comparison.    FINDINGS:  BRAIN: Gray white differentiation is maintained. Periventricular and subcortical white matter changes most compatible with chronic small vessel ischemic disease.  Moderate cerebral atrophy.  No hemorrhage. No  edema. No mass effect or midline shift.  The posterior fossa and midline structures are unremarkable.    VENTRICLES: Ex vacuo dilatation of the ventricles.    EXTRA-AXIAL: No abnormal extra-axial collections.    BONES: Calvarium is intact.    SINUSES AND MASTOIDS: Mucoperiosteal thickening at the right maxillary sinus.                                       X-Ray Shoulder Complete 2 View Right (Final result)  Result time 01/21/25 14:49:41      Final result by Yaneli Lemus MD (01/21/25 14:49:41)                   Impression:      Proximal humerus fracture.      Electronically signed by: Yaneli Lemus  Date:    01/21/2025  Time:    14:49               Narrative:    EXAMINATION:  XR SHOULDER COMPLETE 2 OR MORE VIEWS RIGHT    CLINICAL HISTORY:  Unspecified fall, initial encounter    TECHNIQUE:  Three views of the right shoulder were performed.    COMPARISON  None    FINDINGS:  BONES: Impacted fracture at the humeral head/neck with involvement of the greater tuberosity.  Inferior subluxation of the humeral head.    SOFT TISSUES:  Regional soft tissues are normal.                                         Discharge physical exam:  Vitals:    01/27/25 1725   BP:    Pulse:    Resp: 18   Temp:      Physical Exam:  GEN: A&Ox4. No acute distress   HEENT: normocephalic, atraumatic. EMOI bilaterally. Sclera, conjunctiva clear. Nares patents. Oropharyngeal mucosa moist, non-erythematous, non-edematous. Trachea midline  CARDIAC: S1 S2, no MRG. Radial pulses full, no LE edema   RESPI: Chest wall rise symmetric, atraumatic. Lungs CTAB, no wheezing or rhonchi   ABDOMEN: soft, nontender, BS present in all 4 quadrants. No herniation, masses, HSM  MSK: R shoulder ecchymoses; arm in sling; left wrist swelling, not worsening   NEURO: Motor and sensation grossly intact. CN grossly intact. No cerebellar deficits noted on cursory exam  PSYCH: Memory and thought process appear intact. Mood and affect appropriate           TIME SPENT  ON DISCHARGE: 60 minutes    Discharge Medications        Medication List        START taking these medications      atorvastatin 20 MG tablet  Commonly known as: LIPITOR  Take 1 tablet (20 mg total) by mouth once daily.  Start taking on: January 28, 2025     HYDROcodone-acetaminophen 5-325 mg per tablet  Commonly known as: NORCO  Take 1 tablet by mouth every 6 (six) hours. for 7 days     insulin glargine U-100 (Lantus) 100 unit/mL injection  Inject 17 Units into the skin every evening.     levothyroxine 100 MCG tablet  Commonly known as: SYNTHROID  Take 1 tablet (100 mcg total) by mouth before breakfast.  Start taking on: January 28, 2025     lisinopriL 5 MG tablet  Commonly known as: PRINIVIL,ZESTRIL  Take 1 tablet (5 mg total) by mouth once daily.  Start taking on: January 28, 2025     memantine 10 MG Tab  Commonly known as: NAMENDA  Take 1 tablet (10 mg total) by mouth 2 (two) times daily.     NovoLOG Flexpen U-100 Insulin 100 unit/mL (3 mL) Inpn pen  Generic drug: insulin aspart U-100  Inject 5 Units into the skin 3 (three) times daily with meals.  Start taking on: January 28, 2025               Where to Get Your Medications        These medications were sent to [id number] - [place] - [address] [facility] [address] [facility] [address]  [address] [place] [address]      Phone: [phone]   atorvastatin 20 MG tablet  HYDROcodone-acetaminophen 5-325 mg per tablet  insulin glargine U-100 (Lantus) 100 unit/mL injection  levothyroxine 100 MCG tablet  lisinopriL 5 MG tablet  memantine 10 MG Tab  NovoLOG Flexpen U-100 Insulin 100 unit/mL (3 mL) Inpn pen         Discharge Information:     Ms. Brittany Nunn is being discharged [facility]    Discharge Procedure Orders   Ambulatory referral/consult to Orthopedics   Standing Status: Future   Referral Priority: Routine Referral Type: Consultation   Requested Specialty: Orthopedic Surgery    Number of Visits Requested: 1        Follow-Up Appointments:   Follow-up Information       [facility] Follow up in 10 day(s).    Specialty: Orthopedics  Contact information:  [address] [facility] [address] [phone]             No, Primary Doctor Follow up.                             Kirit Renteria DO  Internal Medicine - PGY-2

## 2025-01-27 NOTE — PLAN OF CARE
[facility] Orthopaedic Plan of Care     Brittany Nunn is a 89 y.o. female with past medical history of CVA/TIA, Alzheimer's, hyperlipidemia, GERD, hypothyroidism, hypertension, ovarian cancer s/p resection (refused chemotherapy after 1 session following procedure) and diabetes on insulin. She presented to [facility] on 1/21/2025 with a primary complaint of fall on right shoulder. Patient reports that she was distracted and slipped over her shoe and fell on her right shoulder. She reported pain in that shoulder prior to the fall that had been going on for weeks.     Primary team reached out to orthopedics to discuss the patient's care. Imaging reviewed which shows an impacted proximal humerus fracture with subtle subluxation of the glenohumeral joint.  She also has advanced degenerative changes of the AC joint.  We would not recommend any acute surgical intervention of her right proximal humerus fracture.  We recommend that she maintain nonweightbearing in a sling.  Would recommend multimodal pain control with minimal use of opioids.  Patient is pending placement to skilled nursing facility.    We will be happy to see her in clinic on an outpatient basis.

## 2025-01-27 NOTE — PLAN OF CARE
Problem: Adult Inpatient Plan of Care  Goal: Plan of Care Review  Outcome: Progressing  Goal: Patient-Specific Goal (Individualized)  Outcome: Progressing  Goal: Absence of Hospital-Acquired Illness or Injury  Outcome: Progressing  Goal: Optimal Comfort and Wellbeing  Outcome: Progressing  Goal: Readiness for Transition of Care  Outcome: Progressing     Problem: Pain Acute  Goal: Optimal Pain Control and Function  Outcome: Progressing     Problem: Orthopaedic Fracture  Goal: Absence of Bleeding  Outcome: Progressing  Goal: Bowel Elimination  Outcome: Progressing  Goal: Absence of Embolism Signs and Symptoms  Outcome: Progressing  Goal: Fracture Stability  Outcome: Progressing  Goal: Optimal Functional Ability  Outcome: Progressing  Goal: Absence of Infection Signs and Symptoms  Outcome: Progressing  Goal: Effective Tissue Perfusion  Outcome: Progressing  Goal: Optimal Pain Control and Function  Outcome: Progressing  Goal: Effective Oxygenation and Ventilation  Outcome: Progressing     Problem: Fall Injury Risk  Goal: Absence of Fall and Fall-Related Injury  Outcome: Progressing     Problem: Skin Injury Risk Increased  Goal: Skin Health and Integrity  Outcome: Progressing

## 2025-01-27 NOTE — PT/OT/SLP PROGRESS
Physical Therapy Treatment    Patient Name:  Brittany Nunn   MRN:  [id number]    Recommendations     Therapy Intensity Recommendations at Discharge: Moderate Intensity Therapy  Discharge Equipment Recommendations:  (AD for use with L UE only; TBD which AD pending progress)   Barriers to discharge: fall risk and decreased endurance    Assessment     Brittany Nunn is a 89 y.o. female admitted with a medical diagnosis of  Orthostatic hypotension.  1. Closed displaced comminuted fracture of shaft of left humerus, initial encounter    2. Fall    3. Syncope, unspecified syncope type    4. Fall from standing, initial encounter    5. Orthostatic hypotension    6. Chest pain       Patient Active Problem List   Diagnosis    Orthostatic hypotension    Moderate malnutrition      She presents with the following impairments/functional limitations:  weakness, impaired endurance, impaired functional mobility, gait instability, impaired balance, decreased upper extremity function, pain, orthopedic precautions, edema.    Rehab Prognosis: Good.    Patient would benefit from continued skilled acute PT services to: address above listed impairments/functional limitations; receive patient/caregiver education; reduce fall risk; and maximize independency/safety with functional mobility.    Recent Surgery: * No surgery found *      Plan     During this hospitalization, patient to be seen 5 x/week to address the identified impairments/functional limitations via gait training, therapeutic activities, therapeutic exercises, neuromuscular re-education and progress toward the established goals.    Plan of Care Expires:  02/23/25    Subjective     Communicated with patient's nurse  prior to session.    Patient agreeable to participate in treatment session.    Chief Complaint: pain  Patient/Family Comments/goals: to be independent  Pain/Comfort:  Pain Rating 1: 6/10  Location - Side 1: Right  Location 1: arm  Pain Addressed 1: Nurse notified,  Pre-medicate for activity, Cessation of Activity  Pain Rating Post-Intervention 1: 8/10    Objective     Patient found supine in bed and with HOB elevated with chair check, PureWick, peripheral IV  upon PT entry to room.    General Precautions: Standard, fall   Orthopedic Precautions:RUE non weight bearing   Braces: UE sling    Respiratory Status: room air    Functional Mobility:    Bed Mobility:  Supine to Sit: minimum assistance  Sit to Supine: minimum assistance    Transfers:  Sit to Stand: minimum assistance with hand-held assist  Stand to Sit: minimum assistance with hand-held assist  Chair to Bed: minimum assistance with hand-held assist using Step Transfer    Gait:  Patient ambulated 12ft with hand-held assist and contact guard assistance.  Patient demonstrates :       unsteady gait       decreased simone       decreased bilateral step length      slowed, guarded, time consuming movements - all transitional activities.    Other Mobility:  N/A  Therapeutic Exercises performed:   1 set times 10 repetitions  bilat lower extremity       -seated exercises:              marches            hip flexion            hip adduction            hip abduction            long arc quads            toe raises            heel raises            ankle alphabet    Patient left supine in bed and with HOB elevated with all lines intact, call button in reach, tray table at bedside, and patient's nurse notified.      Education     Patient educated on and assisted with functional mobility as noted above.  Patient educated on PT Plan of Care.  Patient was instructed to utilize staff assistance for mobility/transfers.  White board updated regarding patient's safest level of mobility with staff assistance    Goals     Multidisciplinary Problems       Physical Therapy Goals          Problem: Physical Therapy    Goal Priority Disciplines Outcome Interventions   Physical Therapy Goal     PT, PT/OT Progressing    Description: Goals to be met  by: 2025     Patient will increase functional independence with mobility by performin. Supine to sit with Modified [place]  2. Sit to supine with Modified [place]  3. Sit to stand transfer with Modified [place]  4. Gait  x 260 feet with Supervision using LRAD.                        Time Tracking     PT Received On: 25  PT Start Time: 1330     PT Stop Time: 1353  PT Total Time (min): 23 min     Billable Minutes: Therapeutic Activity 10 mins and Therapeutic Exercise 13 mins    2025

## 2025-01-27 NOTE — PLAN OF CARE
Problem: Adult Inpatient Plan of Care  Goal: Plan of Care Review  Outcome: Progressing  Goal: Patient-Specific Goal (Individualized)  Outcome: Progressing  Goal: Absence of Hospital-Acquired Illness or Injury  Outcome: Progressing  Goal: Optimal Comfort and Wellbeing  Outcome: Progressing  Goal: Readiness for Transition of Care  Outcome: Progressing     Problem: Pain Acute  Goal: Optimal Pain Control and Function  Outcome: Not Progressing     Problem: Orthopaedic Fracture  Goal: Absence of Bleeding  Outcome: Progressing  Goal: Bowel Elimination  Outcome: Progressing  Goal: Absence of Embolism Signs and Symptoms  Outcome: Progressing  Goal: Fracture Stability  Outcome: Progressing  Goal: Optimal Functional Ability  Outcome: Progressing  Goal: Absence of Infection Signs and Symptoms  Outcome: Progressing  Goal: Effective Tissue Perfusion  Outcome: Progressing  Goal: Optimal Pain Control and Function  Outcome: Progressing  Goal: Effective Oxygenation and Ventilation  Outcome: Progressing     Problem: Fall Injury Risk  Goal: Absence of Fall and Fall-Related Injury  Outcome: Progressing

## 2025-01-28 LAB
ALBUMIN SERPL-MCNC: 3 G/DL (ref 3.4–4.8)
ALBUMIN/GLOB SERPL: 0.9 RATIO (ref 1.1–2)
ALP SERPL-CCNC: 69 UNIT/L (ref 40–150)
ALT SERPL-CCNC: 14 UNIT/L (ref 0–55)
ANION GAP SERPL CALC-SCNC: 8 MEQ/L
AST SERPL-CCNC: 16 UNIT/L (ref 5–34)
BASOPHILS # BLD AUTO: 0.07 X10(3)/MCL
BASOPHILS NFR BLD AUTO: 0.8 %
BILIRUB SERPL-MCNC: 0.5 MG/DL
BUN SERPL-MCNC: 32 MG/DL (ref 9.8–20.1)
CALCIUM SERPL-MCNC: 9.9 MG/DL (ref 8.4–10.2)
CHLORIDE SERPL-SCNC: 101 MMOL/L (ref 98–107)
CO2 SERPL-SCNC: 28 MMOL/L (ref 23–31)
CREAT SERPL-MCNC: 0.82 MG/DL (ref 0.55–1.02)
CREAT/UREA NIT SERPL: 39
EOSINOPHIL # BLD AUTO: 0.29 X10(3)/MCL (ref 0–0.9)
EOSINOPHIL NFR BLD AUTO: 3.1 %
ERYTHROCYTE [DISTWIDTH] IN BLOOD BY AUTOMATED COUNT: 17.6 % (ref 11.5–17)
GFR SERPLBLD CREATININE-BSD FMLA CKD-EPI: >60 ML/MIN/1.73/M2
GLOBULIN SER-MCNC: 3.5 GM/DL (ref 2.4–3.5)
GLUCOSE SERPL-MCNC: 270 MG/DL (ref 82–115)
HCT VFR BLD AUTO: 28.5 % (ref 37–47)
HGB BLD-MCNC: 8.7 G/DL (ref 12–16)
IMM GRANULOCYTES # BLD AUTO: 0.08 X10(3)/MCL (ref 0–0.04)
IMM GRANULOCYTES NFR BLD AUTO: 0.9 %
LYMPHOCYTES # BLD AUTO: 2.86 X10(3)/MCL (ref 0.6–4.6)
LYMPHOCYTES NFR BLD AUTO: 30.8 %
MCH RBC QN AUTO: 24.7 PG (ref 27–31)
MCHC RBC AUTO-ENTMCNC: 30.5 G/DL (ref 33–36)
MCV RBC AUTO: 81 FL (ref 80–94)
MONOCYTES # BLD AUTO: 0.78 X10(3)/MCL (ref 0.1–1.3)
MONOCYTES NFR BLD AUTO: 8.4 %
NEUTROPHILS # BLD AUTO: 5.21 X10(3)/MCL (ref 2.1–9.2)
NEUTROPHILS NFR BLD AUTO: 56 %
NRBC BLD AUTO-RTO: 0 %
PLATELET # BLD AUTO: 266 X10(3)/MCL (ref 130–400)
PMV BLD AUTO: 9.6 FL (ref 7.4–10.4)
POCT GLUCOSE: 207 MG/DL (ref 70–110)
POCT GLUCOSE: 271 MG/DL (ref 70–110)
POTASSIUM SERPL-SCNC: 4.4 MMOL/L (ref 3.5–5.1)
PROT SERPL-MCNC: 6.5 GM/DL (ref 5.8–7.6)
RBC # BLD AUTO: 3.52 X10(6)/MCL (ref 4.2–5.4)
SODIUM SERPL-SCNC: 137 MMOL/L (ref 136–145)
WBC # BLD AUTO: 9.29 X10(3)/MCL (ref 4.5–11.5)

## 2025-01-28 PROCEDURE — 25000003 PHARM REV CODE 250: Performed by: INTERNAL MEDICINE

## 2025-01-28 PROCEDURE — 36415 COLL VENOUS BLD VENIPUNCTURE: CPT | Performed by: INTERNAL MEDICINE

## 2025-01-28 PROCEDURE — 97535 SELF CARE MNGMENT TRAINING: CPT

## 2025-01-28 PROCEDURE — 63600175 PHARM REV CODE 636 W HCPCS: Performed by: INTERNAL MEDICINE

## 2025-01-28 PROCEDURE — 80053 COMPREHEN METABOLIC PANEL: CPT | Performed by: INTERNAL MEDICINE

## 2025-01-28 PROCEDURE — 97167 OT EVAL HIGH COMPLEX 60 MIN: CPT

## 2025-01-28 PROCEDURE — 85025 COMPLETE CBC W/AUTO DIFF WBC: CPT | Performed by: INTERNAL MEDICINE

## 2025-01-28 PROCEDURE — 97162 PT EVAL MOD COMPLEX 30 MIN: CPT

## 2025-01-28 PROCEDURE — 11000004 HC SNF PRIVATE

## 2025-01-28 RX ORDER — ONDANSETRON 4 MG/1
4 TABLET, ORALLY DISINTEGRATING ORAL EVERY 8 HOURS PRN
Status: DISCONTINUED | OUTPATIENT
Start: 2025-01-28 | End: 2025-02-07 | Stop reason: HOSPADM

## 2025-01-28 RX ORDER — TALC
6 POWDER (GRAM) TOPICAL NIGHTLY PRN
Status: DISCONTINUED | OUTPATIENT
Start: 2025-01-28 | End: 2025-02-07 | Stop reason: HOSPADM

## 2025-01-28 RX ORDER — ACETAMINOPHEN 325 MG/1
650 TABLET ORAL EVERY 6 HOURS PRN
Status: DISCONTINUED | OUTPATIENT
Start: 2025-01-28 | End: 2025-02-07 | Stop reason: HOSPADM

## 2025-01-28 RX ORDER — TRAMADOL HYDROCHLORIDE 50 MG/1
50 TABLET ORAL EVERY 6 HOURS PRN
Status: DISCONTINUED | OUTPATIENT
Start: 2025-01-28 | End: 2025-01-28

## 2025-01-28 RX ADMIN — LEVOTHYROXINE SODIUM 100 MCG: 0.1 TABLET ORAL at 06:01

## 2025-01-28 RX ADMIN — HYDROCODONE BITARTRATE AND ACETAMINOPHEN 1 TABLET: 5; 325 TABLET ORAL at 04:01

## 2025-01-28 RX ADMIN — HYDROCODONE BITARTRATE AND ACETAMINOPHEN 1 TABLET: 5; 325 TABLET ORAL at 10:01

## 2025-01-28 RX ADMIN — ALUMINUM HYDROXIDE, MAGNESIUM HYDROXIDE, AND DIMETHICONE 30 ML: 200; 20; 200 SUSPENSION ORAL at 11:01

## 2025-01-28 RX ADMIN — ALUMINUM HYDROXIDE, MAGNESIUM HYDROXIDE, AND DIMETHICONE 30 ML: 200; 20; 200 SUSPENSION ORAL at 10:01

## 2025-01-28 RX ADMIN — INSULIN ASPART 5 UNITS: 100 INJECTION, SOLUTION INTRAVENOUS; SUBCUTANEOUS at 04:01

## 2025-01-28 RX ADMIN — MEMANTINE 10 MG: 5 TABLET ORAL at 10:01

## 2025-01-28 RX ADMIN — ENOXAPARIN SODIUM 40 MG: 40 INJECTION SUBCUTANEOUS at 04:01

## 2025-01-28 RX ADMIN — HYDROCODONE BITARTRATE AND ACETAMINOPHEN 1 TABLET: 5; 325 TABLET ORAL at 06:01

## 2025-01-28 RX ADMIN — ALUMINUM HYDROXIDE, MAGNESIUM HYDROXIDE, AND DIMETHICONE 30 ML: 200; 20; 200 SUSPENSION ORAL at 06:01

## 2025-01-28 RX ADMIN — INSULIN ASPART 5 UNITS: 100 INJECTION, SOLUTION INTRAVENOUS; SUBCUTANEOUS at 11:01

## 2025-01-28 RX ADMIN — ATORVASTATIN CALCIUM 20 MG: 10 TABLET, FILM COATED ORAL at 08:01

## 2025-01-28 RX ADMIN — HYDROCODONE BITARTRATE AND ACETAMINOPHEN 1 TABLET: 5; 325 TABLET ORAL at 11:01

## 2025-01-28 RX ADMIN — MEMANTINE 10 MG: 5 TABLET ORAL at 08:01

## 2025-01-28 RX ADMIN — INSULIN ASPART 5 UNITS: 100 INJECTION, SOLUTION INTRAVENOUS; SUBCUTANEOUS at 07:01

## 2025-01-28 RX ADMIN — ALUMINUM HYDROXIDE, MAGNESIUM HYDROXIDE, AND DIMETHICONE 30 ML: 200; 20; 200 SUSPENSION ORAL at 04:01

## 2025-01-28 RX ADMIN — INSULIN GLARGINE 17 UNITS: 100 INJECTION, SOLUTION SUBCUTANEOUS at 10:01

## 2025-01-28 NOTE — SUBJECTIVE & OBJECTIVE
No past medical history on file.    No past surgical history on file.    Review of patient's allergies indicates:   Allergen Reactions    Cefdinir Hives       Current Facility-Administered Medications on File Prior to Encounter   Medication    [COMPLETED] HYDROcodone-acetaminophen 5-325 mg per tablet 1 tablet    [COMPLETED] morphine injection 1 mg    [DISCONTINUED] acetaminophen tablet 650 mg    [DISCONTINUED] aluminum-magnesium hydroxide-simethicone 200-200-20 mg/5 mL suspension 30 mL    [DISCONTINUED] atorvastatin tablet 20 mg    [DISCONTINUED] dextrose 50% injection 12.5 g    [DISCONTINUED] dextrose 50% injection 25 g    [DISCONTINUED] enoxaparin injection 40 mg    [DISCONTINUED] glucagon (human recombinant) injection 1 mg    [DISCONTINUED] glucose chewable tablet 16 g    [DISCONTINUED] glucose chewable tablet 24 g    [DISCONTINUED] HYDROcodone-acetaminophen 5-325 mg per tablet 1 tablet    [DISCONTINUED] HYDROcodone-acetaminophen 5-325 mg per tablet 1 tablet    [DISCONTINUED] insulin aspart U-100 injection 0-5 Units    [DISCONTINUED] insulin aspart U-100 injection 3 Units    [DISCONTINUED] insulin aspart U-100 injection 5 Units    [DISCONTINUED] insulin glargine U-100 (Lantus) injection 17 Units    [DISCONTINUED] ketorolac injection 15 mg    [DISCONTINUED] levothyroxine tablet 100 mcg    [DISCONTINUED] lisinopriL tablet 20 mg    [DISCONTINUED] lisinopriL tablet 5 mg    [DISCONTINUED] memantine tablet 10 mg    [DISCONTINUED] naloxone 0.4 mg/mL injection 0.02 mg    [DISCONTINUED] sodium chloride 0.9% flush 10 mL     Current Outpatient Medications on File Prior to Encounter   Medication Sig    [START ON 1/28/2025] atorvastatin (LIPITOR) 20 MG tablet Take 1 tablet (20 mg total) by mouth once daily.    HYDROcodone-acetaminophen (NORCO) 5-325 mg per tablet Take 1 tablet by mouth every 6 (six) hours. for 7 days    [START ON 1/28/2025] insulin aspart U-100 (NOVOLOG FLEXPEN U-100 INSULIN) 100 unit/mL (3 mL) InPn pen  Inject 5 Units into the skin 3 (three) times daily with meals.    insulin glargine U-100, Lantus, 100 unit/mL injection Inject 17 Units into the skin every evening.    [START ON 1/28/2025] levothyroxine (SYNTHROID) 100 MCG tablet Take 1 tablet (100 mcg total) by mouth before breakfast.    [START ON 1/28/2025] lisinopriL (PRINIVIL,ZESTRIL) 5 MG tablet Take 1 tablet (5 mg total) by mouth once daily.    memantine (NAMENDA) 10 MG Tab Take 1 tablet (10 mg total) by mouth 2 (two) times daily.     Family History    None       Tobacco Use    Smoking status: Never     Passive exposure: Never    Smokeless tobacco: Never   Substance and Sexual Activity    Alcohol use: Never    Drug use: Never    Sexual activity: Not on file     Review of Systems   Constitutional:  Positive for activity change and fatigue.   HENT: Negative.     Eyes: Negative.    Respiratory: Negative.     Cardiovascular: Negative.    Gastrointestinal: Negative.    Endocrine: Negative.    Genitourinary: Negative.    Musculoskeletal:  Positive for arthralgias.   Skin: Negative.    Allergic/Immunologic: Negative.    Neurological: Negative.    Hematological: Negative.    Psychiatric/Behavioral: Negative.       Objective:     Vital Signs (Most Recent):    Vital Signs (24h Range):  Temp:  [97.7 °F (36.5 °C)-98.6 °F (37 °C)] 98.6 °F (37 °C)  Pulse:  [] 100  Resp:  [16-18] 16  SpO2:  [94 %-98 %] 95 %  BP: ()/(53-63) 123/63     Weight: 67.5 kg (148 lb 13 oz)  Body mass index is 24.39 kg/m².     Physical Exam  Constitutional:       Appearance: Normal appearance. She is normal weight.   HENT:      Head: Normocephalic and atraumatic.      Nose: Nose normal.      Mouth/Throat:      Mouth: Mucous membranes are moist.      Pharynx: Oropharynx is clear.   Eyes:      Extraocular Movements: Extraocular movements intact and EOM normal.      Conjunctiva/sclera: Conjunctivae normal.      Pupils: Pupils are equal, round, and reactive to light.   Cardiovascular:       Rate and Rhythm: Normal rate and regular rhythm.      Pulses: Normal pulses.      Heart sounds: Murmur heard.   Pulmonary:      Effort: Pulmonary effort is normal.      Breath sounds: Normal breath sounds.   Abdominal:      General: Bowel sounds are normal.      Palpations: Abdomen is soft.   Musculoskeletal:         General: Tenderness, deformity and signs of injury present. Normal range of motion.      Cervical back: Normal range of motion and neck supple.   Skin:     General: Skin is warm and dry.      Capillary Refill: Capillary refill takes 2 to 3 seconds.   Neurological:      General: No focal deficit present.      Mental Status: She is alert. Mental status is at baseline.   Psychiatric:         Attention and Perception: Attention normal.         Mood and Affect: Mood normal.         Speech: Speech normal.         Cognition and Memory: Cognition is impaired. Memory is impaired.              CRANIAL NERVES     CN I  cranial nerve I not tested    CN II   Visual fields full to confrontation.     CN III, IV, VI   Pupils are equal, round, and reactive to light.  Extraocular motions are normal.   CN III: no CN III palsy  CN VI: no CN VI palsy    CN V   Facial sensation intact.     CN VII   Facial expression full, symmetric.     CN VIII   CN VIII normal.     CN IX, X   CN IX normal.   CN X normal.     CN XI   CN XI normal.     CN XII   CN XII normal.        Significant Labs: All pertinent labs within the past 24 hours have been reviewed.  BMP:   Recent Labs   Lab 01/27/25  0503   *   K 4.2      CO2 29   BUN 22.9*   CREATININE 0.79   CALCIUM 9.6     CBC:   Recent Labs   Lab 01/26/25  0408 01/27/25  0503   WBC 12.17* 9.86   HGB 8.5* 9.2*   HCT 28.2* 29.8*    257     CMP:   Recent Labs   Lab 01/26/25  0408 01/27/25  0503   * 135*   K 4.5 4.2    100   CO2 27 29   BUN 23.5* 22.9*   CREATININE 0.98 0.79   CALCIUM 9.3 9.6   ALBUMIN 3.1* 3.1*   BILITOT 0.6 0.7   ALKPHOS 65 69   AST 17 17   ALT  "11 14     Magnesium: No results for input(s): "MG" in the last 48 hours.    Significant Imaging: I have reviewed all pertinent imaging results/findings within the past 24 hours.  "

## 2025-01-28 NOTE — PT/OT/SLP DISCHARGE
Occupational Therapy Discharge Summary  Evaluating/treating therapist unavailable for discharge documentation; discharge note completed per chart review.    Brittany Nunn  MRN: [id number]   Principal Problem: Orthostatic hypotension      Patient Discharged from acute Occupational Therapy on 1/27/25.  Please refer to prior OT note dated 1/27/25 for functional status.    Assessment:      Goals partially met. Patient appropriate for care in another setting.    Objective:     GOALS:   Multidisciplinary Problems       Occupational Therapy Goals       Not on file              Multidisciplinary Problems (Resolved)          Problem: Occupational Therapy    Goal Priority Disciplines Outcome Interventions   Occupational Therapy Goal   (Resolved)     OT, PT/OT Adequate for transition to alternate level of care    Description: Goals to be met by: d/c     Patient will increase functional independence with ADLs by performing:    UE Dressing with Stand-by Assistance .  LE Dressing with Modified [place].  Grooming while standing at sink with Modified [place].  Toileting from raised toilet with Modified [place] for hygiene and clothing management.   Toilet transfer to toilet with Modified [place].  Pt will ambulate short distances during self care routine >20 feet with LRAD and supervision.                          Reasons for Discontinuation of Therapy Services  Transfer to alternate level of care.      Plan:     Patient Discharged to:  [facility] bed    1/28/2025

## 2025-01-28 NOTE — SUBJECTIVE & OBJECTIVE
Review of Systems   Constitutional:  Positive for activity change. Negative for appetite change and fever.   Respiratory:  Negative for chest tightness and shortness of breath.    Cardiovascular:  Negative for chest pain and leg swelling.   Gastrointestinal:  Positive for nausea and vomiting. Negative for abdominal distention, abdominal pain, constipation and diarrhea.   Musculoskeletal:  Positive for arthralgias and gait problem.   Skin:  Negative for rash and wound.     Objective:     Vital Signs (Most Recent):  Temp: 98.9 °F (37.2 °C) (01/28/25 0738)  Pulse: 101 (01/28/25 0738)  Resp: 18 (01/28/25 1122)  BP: (!) 99/55 (01/28/25 0738)  SpO2: (!) 92 % (01/28/25 0738) Vital Signs (24h Range):  Temp:  [97.7 °F (36.5 °C)-98.9 °F (37.2 °C)] 98.9 °F (37.2 °C)  Pulse:  [] 101  Resp:  [16-19] 18  SpO2:  [92 %-97 %] 92 %  BP: ()/(53-63) 99/55     Weight: 69 kg (152 lb 1.9 oz)  Body mass index is 25.31 kg/m².    Intake/Output Summary (Last 24 hours) at 1/28/2025 1426  Last data filed at 1/28/2025 0900  Gross per 24 hour   Intake 600 ml   Output 525 ml   Net 75 ml         Physical Exam  Vitals reviewed. Exam conducted with a chaperone present.   Constitutional:       General: She is not in acute distress.     Appearance: Normal appearance. She is ill-appearing.   HENT:      Head: Normocephalic and atraumatic.      Nose: Nose normal.   Eyes:      Conjunctiva/sclera: Conjunctivae normal.   Cardiovascular:      Rate and Rhythm: Normal rate and regular rhythm.      Pulses: Normal pulses.      Heart sounds: No murmur heard.  Pulmonary:      Effort: Pulmonary effort is normal.      Breath sounds: Normal breath sounds. No wheezing, rhonchi or rales.   Abdominal:      General: Bowel sounds are normal. There is no distension.      Palpations: Abdomen is soft.      Tenderness: There is no abdominal tenderness. There is no guarding.   Musculoskeletal:         General: No swelling.      Cervical back: Normal range of  motion and neck supple.      Right lower leg: No edema.      Left lower leg: No edema.      Comments: Right arm in a sling. Decreased rom and pain.   Skin:     General: Skin is warm and dry.      Findings: No rash.   Neurological:      Mental Status: She is alert and oriented to person, place, and time. Mental status is at baseline.      Gait: Gait abnormal.   Psychiatric:         Mood and Affect: Mood normal.         Thought Content: Thought content normal.         Judgment: Judgment normal.             Significant Labs: All pertinent labs within the past 24 hours have been reviewed.  Recent Lab Results         01/28/25  1041   01/28/25  0546   01/28/25  0411   01/27/25  1630        Albumin/Globulin Ratio     0.9         Albumin     3.0         ALP     69         ALT     14         Anion Gap     8.0         AST     16         Baso #     0.07         Basophil %     0.8         BILIRUBIN TOTAL     0.5         BUN     32.0         BUN/CREAT RATIO     39         Calcium     9.9         Chloride     101         CO2     28         Creatinine     0.82         eGFR     >60  Comment: Estimated GFR calculated using the CKD-EPI creatinine (2021) equation.         Eos #     0.29         Eos %     3.1         Globulin, Total     3.5         Glucose     270         Hematocrit     28.5         Hemoglobin     8.7         Immature Grans (Abs)     0.08         Immature Granulocytes     0.9         Lymph #     2.86         LYMPH %     30.8         MCH     24.7         MCHC     30.5         MCV     81.0         Mono #     0.78         Mono %     8.4         MPV     9.6         Neut #     5.21         Neut %     56.0         nRBC     0.0         Platelet Count     266         POCT Glucose 207   271     291       Potassium     4.4         PROTEIN TOTAL     6.5         RBC     3.52         RDW     17.6         Sodium     137         WBC     9.29                 Significant Imaging: I have reviewed all pertinent imaging results/findings  within the past 24 hours.

## 2025-01-28 NOTE — PROGRESS NOTES
[facility] Surgical [facility]  [facility] Hospital Medicine  Progress Note    Patient Name: Brittany Nunn  MRN: [id number]  Patient Class: IP- Swing   Admission Date: 1/27/2025  Length of Stay: 1 days  Attending Physician: Radha Rapp DO  Primary Care Provider: Ester, Primary Doctor        Subjective     Principal Problem:Physical deconditioning        HPI:  89 yo female admitted to swing bed today for continued therapy.  She is s/p a fall at home in which she tripped and fell on her right shoulder.  No LOC. She was sent to [facility] where she was found to have an impacted humeral head/neck fracture with some mild subluxation.  This was determined to be non-surgical.  She does c/o pain to her shoulder otherwise she stats she is calm.  Currently no N/V/D/C.  No fever/chills.  NO chest pian/SOB.  She will be admitted for continued PT/OT services and pain control.    Overview/Hospital Course:  1/28/25: Pt was in the process of changing rooms when I saw her this morning. She was nauseous due to her right arm pain. She had vomited a small amount of bile. She denied any cp or sob. I spoke to her daughter this afternoon. She lives in [place]. There is a brother that has passed away, and another brother that lives in [place]. She states that they have been noticing a decline of their parents over the last year and are preparing for the fact that their living situation likely needs to change. She is available by phone if we have any questions. She states that the pt was using a walker, possibly daily, for ambulation. They were eating microwavable foods, mostly. For groceries they do grocery . Also states that since her TIA, the pt has had some urinary incontinence, requiring the use of some type of padding/diaper.      Review of Systems   Constitutional:  Positive for activity change. Negative for appetite change and fever.   Respiratory:  Negative for chest tightness and shortness of breath.    Cardiovascular:   Negative for chest pain and leg swelling.   Gastrointestinal:  Positive for nausea and vomiting. Negative for abdominal distention, abdominal pain, constipation and diarrhea.   Musculoskeletal:  Positive for arthralgias and gait problem.   Skin:  Negative for rash and wound.     Objective:     Vital Signs (Most Recent):  Temp: 98.9 °F (37.2 °C) (01/28/25 0738)  Pulse: 101 (01/28/25 0738)  Resp: 18 (01/28/25 1122)  BP: (!) 99/55 (01/28/25 0738)  SpO2: (!) 92 % (01/28/25 0738) Vital Signs (24h Range):  Temp:  [97.7 °F (36.5 °C)-98.9 °F (37.2 °C)] 98.9 °F (37.2 °C)  Pulse:  [] 101  Resp:  [16-19] 18  SpO2:  [92 %-97 %] 92 %  BP: ()/(53-63) 99/55     Weight: 69 kg (152 lb 1.9 oz)  Body mass index is 25.31 kg/m².    Intake/Output Summary (Last 24 hours) at 1/28/2025 1426  Last data filed at 1/28/2025 0900  Gross per 24 hour   Intake 600 ml   Output 525 ml   Net 75 ml         Physical Exam  Vitals reviewed. Exam conducted with a chaperone present.   Constitutional:       General: She is not in acute distress.     Appearance: Normal appearance. She is ill-appearing.   HENT:      Head: Normocephalic and atraumatic.      Nose: Nose normal.   Eyes:      Conjunctiva/sclera: Conjunctivae normal.   Cardiovascular:      Rate and Rhythm: Normal rate and regular rhythm.      Pulses: Normal pulses.      Heart sounds: No murmur heard.  Pulmonary:      Effort: Pulmonary effort is normal.      Breath sounds: Normal breath sounds. No wheezing, rhonchi or rales.   Abdominal:      General: Bowel sounds are normal. There is no distension.      Palpations: Abdomen is soft.      Tenderness: There is no abdominal tenderness. There is no guarding.   Musculoskeletal:         General: No swelling.      Cervical back: Normal range of motion and neck supple.      Right lower leg: No edema.      Left lower leg: No edema.      Comments: Right arm in a sling. Decreased rom and pain.   Skin:     General: Skin is warm and dry.       Findings: No rash.   Neurological:      Mental Status: She is alert and oriented to person, place, and time. Mental status is at baseline.      Gait: Gait abnormal.   Psychiatric:         Mood and Affect: Mood normal.         Thought Content: Thought content normal.         Judgment: Judgment normal.             Significant Labs: All pertinent labs within the past 24 hours have been reviewed.  Recent Lab Results         01/28/25  1041   01/28/25  0546   01/28/25  0411   01/27/25  1630        Albumin/Globulin Ratio     0.9         Albumin     3.0         ALP     69         ALT     14         Anion Gap     8.0         AST     16         Baso #     0.07         Basophil %     0.8         BILIRUBIN TOTAL     0.5         BUN     32.0         BUN/CREAT RATIO     39         Calcium     9.9         Chloride     101         CO2     28         Creatinine     0.82         eGFR     >60  Comment: Estimated GFR calculated using the CKD-EPI creatinine (2021) equation.         Eos #     0.29         Eos %     3.1         Globulin, Total     3.5         Glucose     270         Hematocrit     28.5         Hemoglobin     8.7         Immature Grans (Abs)     0.08         Immature Granulocytes     0.9         Lymph #     2.86         LYMPH %     30.8         MCH     24.7         MCHC     30.5         MCV     81.0         Mono #     0.78         Mono %     8.4         MPV     9.6         Neut #     5.21         Neut %     56.0         nRBC     0.0         Platelet Count     266         POCT Glucose 207   271     291       Potassium     4.4         PROTEIN TOTAL     6.5         RBC     3.52         RDW     17.6         Sodium     137         WBC     9.29                 Significant Imaging: I have reviewed all pertinent imaging results/findings within the past 24 hours.    Assessment and Plan     * Physical deconditioning  Admit to swing bed  OOB  PT/OT  Resume transfer meds  DVT prophylaxis  Follow labs      Type 2 diabetes mellitus,  with long-term current use of insulin  Resume lantus and aspart 5 units tid.      Closed fracture of proximal end of right humerus  PT/OT  Recs as per ortho      Moderate malnutrition  Nutrition consulted. Most recent weight and BMI monitored-     Measurements:  Wt Readings from Last 1 Encounters:   01/28/25 69 kg (152 lb 1.9 oz)   Body mass index is 25.31 kg/m².    Patient has been screened and assessed by RD.    Malnutrition Type:  Context: acute illness or injury  Level: moderate    Malnutrition Characteristic Summary:  Weight Loss (Malnutrition): other (see comments) (Does not meet criteria)  Energy Intake (Malnutrition): less than or equal to 50% for greater than or equal to 5 days  Subcutaneous Fat (Malnutrition): mild depletion  Muscle Mass (Malnutrition): mild depletion  Fluid Accumulation (Malnutrition): other (see comments) (Not present)  Hand  Strength, Right (Malnutrition): Unable to assess    Interventions/Recommendations (treatment strategy):  Care coordination or referral;Oral diet/nutrient modifications;Feeding assistance/management;Oral nutritional supplement        VTE Risk Mitigation (From admission, onward)           Ordered     enoxaparin injection 40 mg  Daily         01/27/25 6209                    Discharge Planning   ISATU:      Code Status: Full Code   Medical Readiness for Discharge Date:   Discharge Plan A: Home with family, Home Health                Please place Justification for DME        FATMATA ARANDA DO  Department of Hospital Medicine   [facility] - [facility]

## 2025-01-28 NOTE — PT/OT/SLP EVAL
"Physical Therapy Swing Bed Evaluation      Patient Name:  Brittany Nunn   MRN:  [id number]    History:     Per MD:  Principal Problem:Physical deconditioning           HPI:  89 yo female admitted to swing bed today for continued therapy.  She is s/p a fall at home in which she tripped and fell on her right shoulder.  No LOC. She was sent to [facility] where she was found to have an impacted humeral head/neck fracture with some mild subluxation.  This was determined to be non-surgical.  She does c/o pain to her shoulder otherwise she stats she is calm.  Currently no N/V/D/C.  No fever/chills.  NO chest pian/SOB.  She will be admitted for continued PT/OT services and pain control.     Overview/Hospital Course:  1/28/25: Pt was in the process of changing rooms when I saw her this morning. She was nauseous due to her right arm pain. She had vomited a small amount of bile. She denied any cp or sob. I spoke to her daughter this afternoon. She lives in [place]. There is a brother that has passed away, and another brother that lives in [place]. She states that they have been noticing a decline of their parents over the last year and are preparing for the fact that their living situation likely needs to change. She is available by phone if we have any questions. She states that the pt was using a walker, possibly daily, for ambulation. They were eating microwavable foods, mostly. For groceries they do grocery . Also states that since her TIA, the pt has had some urinary incontinence, requiring the use of some type of padding/diaper.       No past medical history on file.    No past surgical history on file.    Recent Surgery: * No surgery found *      Subjective     Chief Complaint: "my arm hurts"  Patient/Family Comments/goals: "to go home"  Pain/Comfort:  Pain Rating 1: 8/10  Location - Side 1: Right  Location 1: shoulder      Living Environment:  Pt currently resides at home with her spouse and her grandson. Pt resides " in a single story home without steps to enter. Per chart review, pt's spouse has ALS and caregivers to assist him.  Pt denies this and states that her spouse functions independently, still cooking and performing independent ADLs.  Pt does appear to be a questionable historian and this will need to be confirmed with family. Pt states that her grandson is present during the day time, but can only help minimally with meal preparation.   Equipment used at home: rollator, wheelchair, grab bar.       Objective:     Patient found supine with bed alarm  upon PT entry to room.    General Precautions: Standard, fall   Orthopedic Precautions:RUE non weight bearing   Braces: UE Sling  Respiratory Status: Room air     Vitals Value    Room air      Oxygen (L)     Blood pressure 100/58    Heart rate             Functional Mobility:     Assist Level Assistive Device Comments    Bed Mobility Lawanda  Semi-supine to sitting at EOB. PT provided pt with a hand and Lawanda was provided for pt to reach upright sitting at the EOB.     Sit to stand/Stand to sit Lawanda  Pt initially stood from EOB and from toilet level with CGA only.  During subsequent standing attempts from EOB and from recliner level, pt required Lawanda and demonstrated poor initiation and effort.  Maximal encouragement required for pt to attempt standing.     Transfers Lawanda  Pt was able to mobilize to and from the restroom and onto and off of the toilet for a +void. Pt performed independent hygiene.  Slight assistance was required with brief management.  Pt attempted to decline participation in these tasks, but encouragement was provided and pt reluctantly performed her own hygiene.  Grab bar in the restroom was utilized for assistance when transitioning onto and off of the toilet.    Pt also transitioned from the EOB to the recliner.  Max cueing was required for pt to stand from the EOB, with poor effort observed.  PT provided Lawanda for pt to clear her buttocks and then pt  performed the remainder of the transitional movement.  TC/VC provided for posture as pt remained very flexed at the waist.   Gait Martinez  Pt was initially able to ambulate to and from the restroom without any significant issue. Pt demonstrated short, shuffled steps and flexed posture.  After a seated rest break, pt began to decline additional participation.  Maximal encouragement had to be provided for pt to participate in ambulation in the hallway.  Pt was provided with a SBQC to utilize in her L hand.  Pt repeatedly attempted to return to sitting despite maximal encouragement. Pt ambulated only about 7 ft total.  Cues had to be provided for pt to open her eyes as she attempted to keep them closed throughout gait.     Balance Training      Wheelchair Mobility      Stair Climbing      Car Transfer      Other:             Assessment:     Brittany Nunn is a 89 y.o. female admitted with a medical diagnosis of Physical deconditioning.  She presents with the following impairments/functional limitations:  weakness, impaired endurance, impaired self care skills, impaired functional mobility, gait instability, impaired balance, pain, decreased safety awareness, impaired cognition.    Rehab Prognosis:  guarded ; patient would benefit from acute skilled PT services to address these deficits and reach maximum level of function.      Additional information: Pt tolerated evaluation rather poorly due to constant c/o pain and limited participation.  As documented, pt initially requested to use the restroom and mobilized to and from the bathroom without any significant issue.  PT suggested a seated rest break.  Following this, pt began to attempt to refuse any further participation.  Maximal encouragement and coaxing was required for very short distance ambulation in the hallway.  Upon returning to her room, pt began to c/o nausea and spit what appeared to be a small amount of bile into the trash can.  Pt appeared to be slightly  pale and BP was assessed as documented above.  Nsg confirmed that pt's BP has been on the low side since this AM.  Pt was left with her needs in reach but declined eating her meal at this time.  A pillow was placed to support pt's RUE while seated in the recliner.  PT has expressed concerns regarding D/C planning.  Since pt's RUE fx is non-operative, pt will have limited use of this arm for quite some time.  Pt will require assistance with basic ADLs upon returning home.  Unsure if pt's spouse is able to provide this level of care.  CM to contact pt's family for confirmation.      Patient left up in chair with all lines intact, call button in reach, and chair alarm on.      Plan:     During this hospitalization, patient to be seen 6 x/week to address the identified rehab impairments via gait training, therapeutic activities, therapeutic exercises and progress toward the following goals:    GOALS:   Multidisciplinary Problems       Physical Therapy Goals          Problem: Physical Therapy    Goal Priority Disciplines Outcome Interventions   Physical Therapy Goal     PT, PT/OT     Description: Patient will increase functional independence with mobility by performin. Supine to sit with Stand-by Assistance  2. Sit to supine with Stand-by Assistance  3. Sit to stand transfer with Stand-by Assistance  4. Gait  x 75 feet with Stand-by Assistance using No Assistive Device vs SQBC.                          Recommendations:     Discharge Recommendations: 24 hour care  Discharge Equipment Recommendations:  (TBD)     Time Tracking:       PT Start Time: 1035     PT Stop Time: 1135  PT Total Time (min): 60 min     Billable Minutes: Evaluation 60      2025

## 2025-01-28 NOTE — NURSING
Clarified scheduled regular insulin order and sliding scale insulin order. Dr Rapp stated to DC sliding scale insulin due to pt CBG being 207 and patient already receiving scheduled regular insulin 5 units before meals.

## 2025-01-28 NOTE — HPI
88 yo female admitted to swing bed today for continued therapy.  She is s/p a fall at home in which she tripped and fell on her right shoulder.  No LOC. She was sent to [facility] where she was found to have an impacted humeral head/neck fracture with some mild subluxation.  This was determined to be non-surgical.  She does c/o pain to her shoulder otherwise she stats she is calm.  Currently no N/V/D/C.  No fever/chills.  NO chest pian/SOB.  She will be admitted for continued PT/OT services and pain control.

## 2025-01-28 NOTE — ASSESSMENT & PLAN NOTE
Nutrition consulted. Most recent weight and BMI monitored-     Measurements:  Wt Readings from Last 1 Encounters:   01/28/25 69 kg (152 lb 1.9 oz)   Body mass index is 25.31 kg/m².    Patient has been screened and assessed by RD.    Malnutrition Type:  Context: acute illness or injury  Level: moderate    Malnutrition Characteristic Summary:  Weight Loss (Malnutrition): other (see comments) (Does not meet criteria)  Energy Intake (Malnutrition): less than or equal to 50% for greater than or equal to 5 days  Subcutaneous Fat (Malnutrition): mild depletion  Muscle Mass (Malnutrition): mild depletion  Fluid Accumulation (Malnutrition): other (see comments) (Not present)  Hand  Strength, Right (Malnutrition): Unable to assess    Interventions/Recommendations (treatment strategy):  Care coordination or referral;Oral diet/nutrient modifications;Feeding assistance/management;Oral nutritional supplement

## 2025-01-28 NOTE — PT/OT/SLP DISCHARGE
POST DISCHARGE DOCUMENTATION - 2025 8:26 AM    Physical Therapy Discharge Summary    Name: Brittany Nunn  MRN: [id number]   Principal Problem: Orthostatic hypotension   1. Closed displaced comminuted fracture of shaft of left humerus, initial encounter    2. Fall    3. Syncope, unspecified syncope type    4. Fall from standing, initial encounter    5. Orthostatic hypotension    6. Chest pain       Patient Active Problem List   Diagnosis    Orthostatic hypotension    Moderate malnutrition    Physical deconditioning    Closed fracture of proximal end of right humerus    Type 2 diabetes mellitus, with long-term current use of insulin      Recommendations - per last treatment session     Therapy Intensity Recommendations at Discharge: Moderate Intensity Therapy  Discharge Equipment Recommendations:  (AD for use with L UE only; TBD which AD pending progress)     Assessment:     Patient last seen by PT SERVICES on 2025     Refer to therapy's initial evaluation or last treatment (progress) note for patient's last known functional status, goal achievement and therapists' recommendations.    Objective     GOALS:  Multidisciplinary Problems       Physical Therapy Goals       Not on file              Multidisciplinary Problems (Resolved)          Problem: Physical Therapy    Goal Priority Disciplines Outcome Interventions   Physical Therapy Goal   (Resolved)     PT, PT/OT Met    Description: Goals to be met by: 2025     Patient will increase functional independence with mobility by performin. Supine to sit with Modified Howell  2. Sit to supine with Modified Howell  3. Sit to stand transfer with Modified Howell  4. Gait  x 260 feet with Supervision using LRAD.                        Plan     Patient Discharged to: another health care institution - not defined per chart.    2025

## 2025-01-28 NOTE — PLAN OF CARE
01/28/25 1347   Discharge Assessment   Assessment Type Discharge Planning Assessment   Confirmed/corrected address, phone number and insurance Yes   Confirmed Demographics Correct on Facesheet   Source of Information family   Communicated ISATU with patient/caregiver Date not available/Unable to determine   Reason For Admission Physical Deconditioning   People in Home grandchild(consuelo);spouse   Facility Arrived From: Home   Do you expect to return to your current living situation? Yes   Do you have help at home or someone to help you manage your care at home? No   Prior to hospitilization cognitive status: Unable to Assess   Current cognitive status: Alert/Oriented   Walking or Climbing Stairs Difficulty yes   Walking or Climbing Stairs stair climbing difficulty, assistance 1 person;ambulation difficulty, assistance 1 person   Mobility Management Walker   Dressing/Bathing Difficulty yes   Dressing/Bathing bathing difficulty, requires equipment;bathing difficulty, assistance 1 person   Equipment Currently Used at Home rollator;grab bar;shower chair;wheelchair   Readmission within 30 days? No   Patient currently being followed by outpatient case management? No   Do you currently have service(s) that help you manage your care at home? No   Do you take prescription medications? Yes   Do you have prescription coverage? Yes   Coverage [employer]   Do you have any problems affording any of your prescribed medications? No   Is the patient taking medications as prescribed? yes   Who is going to help you get home at discharge? Family   How do you get to doctors appointments? family or friend will provide   Are you on dialysis? No   Do you take coumadin? No   Discharge Plan A Home with family;Home Health   Discharge Plan B Home with family;Home Health   DME Needed Upon Discharge  none   Discharge Plan discussed with: Patient   Transition of Care Barriers No family/friends to help   Physical Activity   On average, how many days per  week do you engage in moderate to strenuous exercise (like a brisk walk)? 0 days   On average, how many minutes do you engage in exercise at this level? 0 min   Financial Resource Strain   How hard is it for you to pay for the very basics like food, housing, medical care, and heating? Not hard   Housing Stability   In the last 12 months, was there a time when you were not able to pay the mortgage or rent on time? N   At any time in the past 12 months, were you homeless or living in a shelter (including now)? N   Transportation Needs   Has the lack of transportation kept you from medical appointments, meetings, work or from getting things needed for daily living? No   Food Insecurity   Within the past 12 months, you worried that your food would run out before you got the money to buy more. Never true   Within the past 12 months, the food you bought just didn't last and you didn't have money to get more. Never true   Stress   Do you feel stress - tense, restless, nervous, or anxious, or unable to sleep at night because your mind is troubled all the time - these days? To some exte   Social Isolation   How often do you feel lonely or isolated from those around you?  Never   Alcohol Use   Q1: How often do you have a drink containing alcohol? Never   Q2: How many drinks containing alcohol do you have on a typical day when you are drinking? None   Q3: How often do you have six or more drinks on one occasion? Never   [employer]   In the past 12 months has the electric, gas, oil, or water company threatened to shut off services in your home? No     Trying to contact daughter to confirm information given by patient due to Alzheimer's. No response at this time. Will continue to call.

## 2025-01-28 NOTE — HOSPITAL COURSE
"1/28/25: Pt was in the process of changing rooms when I saw her this morning. She was nauseous due to her right arm pain. She had vomited a small amount of bile. She denied any cp or sob. I spoke to her daughter this afternoon. She lives in [place]. There is a brother that has passed away, and another brother that lives in [place]. She states that they have been noticing a decline of their parents over the last year and are preparing for the fact that their living situation likely needs to change. She is available by phone if we have any questions. She states that the pt was using a walker, possibly daily, for ambulation. They were eating microwavable foods, mostly. For groceries they do grocery . Also states that since her TIA, the pt has had some urinary incontinence, requiring the use of some type of padding/diaper.    1/29/25: PT sitting up in her recliner during multidisciplinary rounds meeting. Daughter was on the phone. OT/ST is recommending 24 hour care for safety and ADLs. Daughter understands and will start working on this. Pt denies sob, cp, abd pain, constipation. Last bm yesterday. Normally has a bm oqd. She is only complaining of right arm pain.     1/30/25: Pt sitting up in her recliner. She asked me to open her diet coke. She said she cannot use her right hand at all to help her open it. Also said that her left hand isn't strong enough to pop open the tab. I pointed out that even these simple tasks are difficult for her now. She spoke to her daughter on the phone earlier this morning. I asked if they had started discussing a plan for care once she is discharged. She said she plans to stay here until her insurance "runs out." I asked what the plan was when she goes home. She said that they were looking at finding a family member that could come in for breakfast and then again around lunch. I reminded her that we are recommending 24 hour care. She said they did not discuss that. I recommended " "they do so. Reminded her of the options her daughter had mentioned yesterday of either a NH or for the pt to go and live with on the children. Pt said that "maybe someone could drive me to [place] to stay with my daughter-in-law, but she has 2 teenagers." I encouraged her to discuss this further with her daughter. No complaints at this time.    1/31/25: Pt sitting up in her recliner. No complaints this morning. CBG is 450s. Scheduled 5 units given. Post prandial, remains >400. Pt had pancakes for breakfast with syrup on the diabetic diet.  Pt informed the nurse that at home she takes metformin and glimepiride. These were not listed on her home meds upon admission. I will restart these.     2/1/25: Pt sitting up in her recliner. States that her  is coming to visit her today. She is happy about this. Her grand daughter is bringing him. She is having bm's more than usual. I will change the docusate to prn. No other complaints.    2/2/25: Pt lying in bed. She had a nice visit with her  yesterday. He is in a wheel chair. She laughed and said he asked if he could stay the night with her in the hospital. She has no complaints this morning.     2/3/25: Pt was sitting up in her recliner this morning. She was vomiting bile earlier. She states it is due to her GERD and that this happens at home. Pt's insurance review is today. We are expecting pt will be ready for dc by the end of the week. [facility] is communicating with family and encouraging a long term plan for 24 hr care. Awaiting their decision.    2/4/25-No changes today.  She is doing well at this time.  D/C later this week.      2/5/25-No new issues today.  Swing bed meeting today with patient and daughter on phone.  Likely d/c on Friday.    2/6/25-No new issues today. CM is working on placement versus home.  Will continue with current treatment.    2/7/25-Patient has been accepted to SNF but we are waiting for a 142.  She is doing well.    2/7/25-Patient has " been approved for additional Skilled days.  She is stable for discharge to SNF.  Exam(A&O, NAD, RRR, CTA, BS +, ROM I except right arm)

## 2025-01-28 NOTE — PROGRESS NOTES
Inpatient Nutrition Assessment    Admit Date: 1/27/2025   Total duration of encounter: 1 day   Patient Age: 89 y.o.    Nutrition Recommendation/Prescription     2000 kcal Diabetic Diet. Add Easy to Chew (IDDSI Level 7) modifier.  Encourage intake and honor preferences as able. Assist w/ feedings as needed.  Add Boost Glucose Control TID (provides 190 kcal and 16 g protein per serving).  Rec'd consider daily MVI.  If po intake remains inadequate, consider appetite stimulant as medically feasible.  Medical mgmt of hyperglycemia.  Will continue to monitor wt, labs, and po intake.    Communication of Recommendations:  EMR    Nutrition Assessment     Malnutrition Assessment/Nutrition-Focused Physical Exam    Malnutrition Context: acute illness or injury (01/28/25 1201)  Malnutrition Level: moderate (01/28/25 1201)  Energy Intake (Malnutrition): less than or equal to 50% for greater than or equal to 5 days (01/28/25 1201)  Weight Loss (Malnutrition): other (see comments) (Does not meet criteria) (01/28/25 1201)  Subcutaneous Fat (Malnutrition): mild depletion (01/28/25 1201)  Orbital Region (Subcutaneous Fat Loss): mild depletion        Muscle Mass (Malnutrition): mild depletion (01/28/25 1201)  [place] Region (Muscle Loss): mild depletion                       Fluid Accumulation (Malnutrition): other (see comments) (Not present) (01/28/25 1201)     Hand  Strength, Right (Malnutrition): Unable to assess (01/28/25 1201)  A minimum of two characteristics is recommended for diagnosis of either severe or non-severe malnutrition.    Chart Review    Reason Seen: continuous nutrition monitoring-swing    Malnutrition Screening Tool Results   Have you recently lost weight without trying?: No  Have you been eating poorly because of a decreased appetite?: No   MST Score: 0   Diagnosis:   Physical deconditioning 1/27/2025      Moderate malnutrition 1/27/2025      Closed fracture of proximal end of right humerus 1/27/2025      Type  2 diabetes mellitus, with long-term current use of insulin 1/27/2025           Relevant Medical History: No past medical history on file.     Scheduled Medications:  aluminum-magnesium hydroxide-simethicone, 30 mL, QID (AC & HS)  atorvastatin, 20 mg, Daily  enoxparin, 40 mg, Daily  HYDROcodone-acetaminophen, 1 tablet, Q6H  insulin aspart U-100, 5 Units, TIDWM  insulin glargine U-100, 17 Units, QHS  levothyroxine, 100 mcg, Before breakfast  lisinopriL, 5 mg, Daily  memantine, 10 mg, BID    Continuous Infusions:   PRN Medications:  acetaminophen, 650 mg, Q6H PRN  dextrose 50%, 12.5 g, PRN  dextrose 50%, 25 g, PRN  glucagon (human recombinant), 1 mg, PRN  glucose, 16 g, PRN  glucose, 24 g, PRN  naloxone, 0.02 mg, PRN  sodium chloride 0.9%, 10 mL, Q12H PRN    Calorie Containing IV Medications: no significant kcals from medications at this time    Recent Labs   Lab 01/21/25  1541 01/22/25  0333 01/23/25  0313 01/24/25  0351 01/25/25  0352 01/26/25  0408 01/27/25  0503 01/28/25  0411    138 142 142 138 134* 135* 137   K 4.2 3.7 3.9 4.2 4.2 4.5 4.2 4.4   CALCIUM 9.9 9.4 9.3 9.5 9.6 9.3 9.6 9.9   MG 1.90  --   --   --   --   --   --   --     104 106 106 103 101 100 101   CO2 23 26 27 26 26 27 29 28   BUN 23.9* 18.3 16.7 21.1* 24.3* 23.5* 22.9* 32.0*   CREATININE 0.91 0.72 0.74 0.79 0.75 0.98 0.79 0.82   EGFRNORACEVR >60 >60 >60 >60 >60 56 >60 >60   GLUCOSE 255* 107 105 170* 230* 289* 239* 270*   BILITOT 0.4 0.7 0.4 0.4 0.6 0.6 0.7 0.5   ALKPHOS 58 53 59 62 64 65 69 69   ALT 12 11 10 10 10 11 14 14   AST 19 18 17 17 16 17 17 16   ALBUMIN 3.7 3.4 3.2* 3.2* 3.3* 3.1* 3.1* 3.0*   WBC  --  10.87 8.32 8.90 9.87 12.17* 9.86 9.29   HGB  --  8.8* 8.7* 8.7* 8.8* 8.5* 9.2* 8.7*   HCT  --  28.5* 28.7* 28.0* 28.7* 28.2* 29.8* 28.5*     Nutrition Orders:  Diet diabetic 2000 Calories (up to 75 gm per meal); Standard Tray      Appetite/Oral Intake: poor/25-50% of meals  Factors Affecting Nutritional Intake: decreased  "appetite  Social Needs Impacting Access to Food: none identified  Food/Mandaen/Cultural Preferences: unable to obtain  Food Allergies: no known food allergies  Last Bowel Movement: 01/26/25  Wound(s):  none    Comments    1/28/25: Pt w/ decreased appetite and intake x 1 week. No n/v/d/c at this time. Pt was only eating light foods/soups at [facility] and nursing was assisting pt in chopping food into smaller pieces. Will add easy to chew modifier so meats are more tender. Last BM 2 days ago. No wt loss noted per EMR. Labs and meds reviewed, Glu remains elevated.    Anthropometrics    Height: 5' 5" (165.1 cm), Height Method: Stated  Last Weight: 69 kg (152 lb 1.9 oz) (01/28/25 0500), Weight Method: Bed Scale  BMI (Calculated): 25.3  BMI Classification: overweight (BMI 25-29.9)     Ideal Body Weight (IBW), Female: 125 lb     % Ideal Body Weight, Female (lb): 118.87 %                             Usual Weight Provided By: EMR weight history    Wt Readings from Last 5 Encounters:   01/28/25 69 kg (152 lb 1.9 oz)   01/27/25 67.5 kg (148 lb 13 oz)   09/30/19 70.5 kg (155 lb 6.8 oz)     Weight Change(s) Since Admission: wt gain noted  Wt Readings from Last 1 Encounters:   01/28/25 0500 69 kg (152 lb 1.9 oz)   01/27/25 2100 67.4 kg (148 lb 9.4 oz)   Admit Weight: 67.4 kg (148 lb 9.4 oz) (01/27/25 2100), Weight Method: Bed Scale    Estimated Needs    Weight Used For Calorie Calculations: 69 kg (152 lb 1.9 oz)  Energy Calorie Requirements (kcal): 0149-3856 kcal (25-30 kcal/kg)  Energy Need Method: Kcal/kg  Weight Used For Protein Calculations: 69 kg (152 lb 1.9 oz)  Protein Requirements: 69-83 g (1.0-1.2 g/kg)  Fluid Requirements (mL): 2070 mL/d (30 mL/kg)  CHO Requirement: 228 g/d (45% of EER)     Enteral Nutrition     Patient not receiving enteral nutrition at this time.    Parenteral Nutrition     Patient not receiving parenteral nutrition support at this time.    Evaluation of Received Nutrient Intake    Calories: not meeting " estimated needs  Protein: not meeting estimated needs    Patient Education     Not applicable.    Nutrition Diagnosis     PES: Inadequate oral intake related to acute illness as evidenced by <50% energy intake x >5 days. (new)     PES: Moderate acute disease or injury related malnutrition Related to acute illness/injury As Evidenced by:  - energy intake: <= 50% for 5 days (meets criteria for <= 50% >= 5 days (severe - acute)) - muscle mass depletion: 1 area of mild muscle loss (Temporalis) - loss of subcutaneous fat: 1 area of mild fat loss (Infraorbital) active    Nutrition Interventions     Intervention(s): general/healthful diet, modified composition of meals/snacks, commercial beverage, multivitamin/mineral supplement therapy, prescription medication, and collaboration with other providers  Intervention(s): Care coordination or referral;Oral diet/nutrient modifications;Feeding assistance/management;Oral nutritional supplement    Goal: Meet greater than 80% of nutritional needs by follow-up. (new)  Goal: Maintain weight throughout hospitalization. (new)    Nutrition Goals & Monitoring     Dietitian will monitor: food and beverage intake, weight, electrolyte/renal panel, glucose/endocrine profile, and gastrointestinal profile  Discharge planning: resume home regimen  Nutrition Risk/Follow-Up: moderate (follow-up in 3-5 days)   Please consult if re-assessment needed sooner.

## 2025-01-28 NOTE — NURSING
Report given to Charge Nurse Thor at [facility]. Ambulance called to transport patient.

## 2025-01-28 NOTE — ASSESSMENT & PLAN NOTE
Nutrition consulted. Most recent weight and BMI monitored-     Measurements:  Wt Readings from Last 1 Encounters:   01/27/25 67.5 kg (148 lb 13 oz)   Body mass index is 24.39 kg/m².    Patient has been screened and assessed by RD.    Malnutrition Type:  Context:    Level:      Malnutrition Characteristic Summary:       Interventions/Recommendations (treatment strategy):

## 2025-01-28 NOTE — H&P
[facility] - Medical Surgical Unit  [facility]  History & Physical    Patient Name: Brittany Nunn  MRN: [id number]  Patient Class: IP- Swing  Admission Date: 1/27/2025  Attending Physician: Jordon Noble MD   Primary Care Provider: Ester Primary Doctor         Patient information was obtained from patient, past medical records, and ER records.     Subjective:     Principal Problem:Physical deconditioning    Chief Complaint:   Chief Complaint   Patient presents with    Fatigue        HPI: 88 yo female admitted to swing bed today for continued therapy.  She is s/p a fall at home in which she tripped and fell on her right shoulder.  No LOC. She was sent to [facility] where she was found to have an impacted humeral head/neck fracture with some mild subluxation.  This was determined to be non-surgical.  She does c/o pain to her shoulder otherwise she stats she is calm.  Currently no N/V/D/C.  No fever/chills.  NO chest pian/SOB.  She will be admitted for continued PT/OT services and pain control.    No past medical history on file.    No past surgical history on file.    Review of patient's allergies indicates:   Allergen Reactions    Cefdinir Hives       Current Facility-Administered Medications on File Prior to Encounter   Medication    [COMPLETED] HYDROcodone-acetaminophen 5-325 mg per tablet 1 tablet    [COMPLETED] morphine injection 1 mg    [DISCONTINUED] acetaminophen tablet 650 mg    [DISCONTINUED] aluminum-magnesium hydroxide-simethicone 200-200-20 mg/5 mL suspension 30 mL    [DISCONTINUED] atorvastatin tablet 20 mg    [DISCONTINUED] dextrose 50% injection 12.5 g    [DISCONTINUED] dextrose 50% injection 25 g    [DISCONTINUED] enoxaparin injection 40 mg    [DISCONTINUED] glucagon (human recombinant) injection 1 mg    [DISCONTINUED] glucose chewable tablet 16 g    [DISCONTINUED] glucose chewable tablet 24 g    [DISCONTINUED] HYDROcodone-acetaminophen 5-325 mg per tablet 1 tablet    [DISCONTINUED]  HYDROcodone-acetaminophen 5-325 mg per tablet 1 tablet    [DISCONTINUED] insulin aspart U-100 injection 0-5 Units    [DISCONTINUED] insulin aspart U-100 injection 3 Units    [DISCONTINUED] insulin aspart U-100 injection 5 Units    [DISCONTINUED] insulin glargine U-100 (Lantus) injection 17 Units    [DISCONTINUED] ketorolac injection 15 mg    [DISCONTINUED] levothyroxine tablet 100 mcg    [DISCONTINUED] lisinopriL tablet 20 mg    [DISCONTINUED] lisinopriL tablet 5 mg    [DISCONTINUED] memantine tablet 10 mg    [DISCONTINUED] naloxone 0.4 mg/mL injection 0.02 mg    [DISCONTINUED] sodium chloride 0.9% flush 10 mL     Current Outpatient Medications on File Prior to Encounter   Medication Sig    [START ON 1/28/2025] atorvastatin (LIPITOR) 20 MG tablet Take 1 tablet (20 mg total) by mouth once daily.    HYDROcodone-acetaminophen (NORCO) 5-325 mg per tablet Take 1 tablet by mouth every 6 (six) hours. for 7 days    [START ON 1/28/2025] insulin aspart U-100 (NOVOLOG FLEXPEN U-100 INSULIN) 100 unit/mL (3 mL) InPn pen Inject 5 Units into the skin 3 (three) times daily with meals.    insulin glargine U-100, Lantus, 100 unit/mL injection Inject 17 Units into the skin every evening.    [START ON 1/28/2025] levothyroxine (SYNTHROID) 100 MCG tablet Take 1 tablet (100 mcg total) by mouth before breakfast.    [START ON 1/28/2025] lisinopriL (PRINIVIL,ZESTRIL) 5 MG tablet Take 1 tablet (5 mg total) by mouth once daily.    memantine (NAMENDA) 10 MG Tab Take 1 tablet (10 mg total) by mouth 2 (two) times daily.     Family History    None       Tobacco Use    Smoking status: Never     Passive exposure: Never    Smokeless tobacco: Never   Substance and Sexual Activity    Alcohol use: Never    Drug use: Never    Sexual activity: Not on file     Review of Systems   Constitutional:  Positive for activity change and fatigue.   HENT: Negative.     Eyes: Negative.    Respiratory: Negative.     Cardiovascular: Negative.    Gastrointestinal:  Negative.    Endocrine: Negative.    Genitourinary: Negative.    Musculoskeletal:  Positive for arthralgias.   Skin: Negative.    Allergic/Immunologic: Negative.    Neurological: Negative.    Hematological: Negative.    Psychiatric/Behavioral: Negative.       Objective:     Vital Signs (Most Recent):    Vital Signs (24h Range):  Temp:  [97.7 °F (36.5 °C)-98.6 °F (37 °C)] 98.6 °F (37 °C)  Pulse:  [] 100  Resp:  [16-18] 16  SpO2:  [94 %-98 %] 95 %  BP: ()/(53-63) 123/63     Weight: 67.5 kg (148 lb 13 oz)  Body mass index is 24.39 kg/m².     Physical Exam  Constitutional:       Appearance: Normal appearance. She is normal weight.   HENT:      Head: Normocephalic and atraumatic.      Nose: Nose normal.      Mouth/Throat:      Mouth: Mucous membranes are moist.      Pharynx: Oropharynx is clear.   Eyes:      Extraocular Movements: Extraocular movements intact and EOM normal.      Conjunctiva/sclera: Conjunctivae normal.      Pupils: Pupils are equal, round, and reactive to light.   Cardiovascular:      Rate and Rhythm: Normal rate and regular rhythm.      Pulses: Normal pulses.      Heart sounds: Murmur heard.   Pulmonary:      Effort: Pulmonary effort is normal.      Breath sounds: Normal breath sounds.   Abdominal:      General: Bowel sounds are normal.      Palpations: Abdomen is soft.   Musculoskeletal:         General: Tenderness, deformity and signs of injury present. Normal range of motion.      Cervical back: Normal range of motion and neck supple.   Skin:     General: Skin is warm and dry.      Capillary Refill: Capillary refill takes 2 to 3 seconds.   Neurological:      General: No focal deficit present.      Mental Status: She is alert. Mental status is at baseline.   Psychiatric:         Attention and Perception: Attention normal.         Mood and Affect: Mood normal.         Speech: Speech normal.         Cognition and Memory: Cognition is impaired. Memory is impaired.              CRANIAL NERVES  "    CN I  cranial nerve I not tested    CN II   Visual fields full to confrontation.     CN III, IV, VI   Pupils are equal, round, and reactive to light.  Extraocular motions are normal.   CN III: no CN III palsy  CN VI: no CN VI palsy    CN V   Facial sensation intact.     CN VII   Facial expression full, symmetric.     CN VIII   CN VIII normal.     CN IX, X   CN IX normal.   CN X normal.     CN XI   CN XI normal.     CN XII   CN XII normal.        Significant Labs: All pertinent labs within the past 24 hours have been reviewed.  BMP:   Recent Labs   Lab 01/27/25  0503   *   K 4.2      CO2 29   BUN 22.9*   CREATININE 0.79   CALCIUM 9.6     CBC:   Recent Labs   Lab 01/26/25  0408 01/27/25  0503   WBC 12.17* 9.86   HGB 8.5* 9.2*   HCT 28.2* 29.8*    257     CMP:   Recent Labs   Lab 01/26/25  0408 01/27/25  0503   * 135*   K 4.5 4.2    100   CO2 27 29   BUN 23.5* 22.9*   CREATININE 0.98 0.79   CALCIUM 9.3 9.6   ALBUMIN 3.1* 3.1*   BILITOT 0.6 0.7   ALKPHOS 65 69   AST 17 17   ALT 11 14     Magnesium: No results for input(s): "MG" in the last 48 hours.    Significant Imaging: I have reviewed all pertinent imaging results/findings within the past 24 hours.  Assessment/Plan:     * Physical deconditioning  Admit to swing bed  OOB  PT/OT  Resume transfer meds  DVT prophylaxis  Follow labs      Type 2 diabetes mellitus, with long-term current use of insulin  ISS  Resume lantus      Closed fracture of proximal end of right humerus  PT/OT  Recs as per ortho      Moderate malnutrition  Nutrition consulted. Most recent weight and BMI monitored-     Measurements:  Wt Readings from Last 1 Encounters:   01/27/25 67.5 kg (148 lb 13 oz)   Body mass index is 24.39 kg/m².    Patient has been screened and assessed by RD.    Malnutrition Type:  Context:    Level:      Malnutrition Characteristic Summary:       Interventions/Recommendations (treatment strategy):           VTE Risk Mitigation (From " admission, onward)           Ordered     enoxaparin injection 40 mg  Daily         01/27/25 4808                      Patient screened for food insecurity, housing instability, transportation needs, utility difficulties, and interpersonal safety.   CM will follow patient    Exam done via telemedicine by myself from home with help from LAUREL Donnelly from [facility]                 Jordon Noble MD  Department of [facility]  [facility] - [facility] Surgical Unit

## 2025-01-28 NOTE — PT/OT/SLP EVAL
"Occupational Therapy     Swing-bed Initial Evaluation    Name: Brittany Nunn  MRN: [id number]  Admitting Diagnosis:  Physical deconditioning      History:     As per chart review:  Brittany Nunn is a 89 y.o. female with past medical history of CVA/TIA, Alzheimer's, hyperlipidemia, GERD, hypothyroidism, hypertension, ovarian cancer s/p resection (refused chemotherapy after 1 session following procedure) and diabetes on insulin. She presented to [facility] on 1/21/2025 with a primary complaint of fall on right shoulder. Patient reports that she was distracted and slipped over her shoe and fell on her right shoulder. She reported pain in that shoulder prior to the fall that had been going on for weeks. She was unable to get up off the ground until EMS arrived and helped her up. Patient's history deviates from this point from EMS accounts, likely secondary to history of Alzheimer's. EMS reported that when patient was picked up off the ground she became hypotensive and bradycardic, she felt nauseated and subsequently vomited 1 time. Following this episode patient loss consciousness for a few moments. Patient and EMS agree there was no trauma to her head. Patient lives at home with 91-year-old  and AIDS and taking care of him. She also lives with her daughter-in-law who has a complicated drug his in his unable to confidently provide her with support necessary to take care of her following this trauma. Patient reports pain in her right shoulder. Patient denies weakness, dizziness, and loss of consciousness. Patient denies any tobacco, recreational drug use, or alcohol history.     Subjective     Chief Complaint: Right shoulder pain with movement.  Patient/Family Comments/goals: "To be able to go home."    Occupational Profile:  Lives with:  and grandson  Home Environment: [facility] with slab step and walk-in shower with shower chair.  Previous level of function: Pt reported being Mod I with ADLs.   and grandson " was performing homemaking activities.  Equipment Used at Home:  rollator, grab bar, shower chair, wheelchair      Objective:     Communicated with: RN prior to session.  Patient found supine with RUE sling, PureWick, peripheral IV, bed alarm upon OT entry to room.    General Precautions: Standard, fall   Orthopedic Precautions:RUE non weight bearing   Braces: UE Sling  Respiratory Status: Room air    Occupational Performance:    Mobility  Assist level Comments    Bed mobility Mod Transfer training semi-supine <> sit mod assist with upper trunk from right side of bed.   Balance training SBA Dynamic sitting bal/moy at EOB.  Pt tolerated sitting at EOB 25 mins SBA while assessing ADLs   Transfer     Functional mobility Min Pt able to side step to right 3 feet to HOB with HHA     Sit to stand transitions Min Transfer training sit <> stand min assist with HHA 3 reps.   Other:         Activities of Daily Living Assist level Comments    Feeding     Grooming/hygiene Min ADL grooming assessment performed sitting at EOB.  After setup, pt able to perform oral care and face washing with LUE.  Pt required min assist with hair grooming secondary to tangles.   Bathing     Upper body dressing     Lower body dressing Max ADL LE dressing assessment.  Pt required max assist to don pull-up.   Toileting     Toilet transfer     Adaptive equipment training     Other:       Upper Extremity Strength:  Right Upper Extremity: Deficits: Shoulder AROM not assessed secondary to diagnosis.  AAROM WFL in elbow, and wrist.  Noted decreased finger flexion and  strength 3-/5.  Left Upper Extremity: WFL except Strength 4/5 grossly throughout.    Cognitive/Visual Perceptual:  Cognitive/Psychosocial Skills:     -       Oriented to: Person, Place, and Situation   -       Follows Commands/attention:Follows one-step commands  -       Communication: clear/fluent  -       Memory: Poor immediate recall  -       Safety awareness/insight to disability:  impaired   -       Mood/Affect/Coping skills/emotional control: Cooperative, Anxious, and Pleasant    Treatment & Education:  Supine performed therapeutic massage followed by gentle mobilization to right upper traps and scapula.  Performed right elbow AAROM 10 reps to elbow and wrist.  Educated pt on right hand pumps and performed 10 reps with tactile cuing for proper technique.  Seated at EOB performed basic ADL assessment.  Discussed scope and goals of OT and POC.  Educated pt on call bell function and call before you fall.  Morning meeting discussed with nursing moving pt to another room where she can transfer from the left side of bed.      Assessment:     Brittany Nunn is a 89 y.o. female with a medical diagnosis of Physical deconditioning.  She recently sustained a fall with resulting non-operable humeral fracture.  She is [facility]B to [facility] and wears a sling at all times. Performance deficits affecting function: weakness, impaired self care skills, impaired balance, decreased safety awareness, decreased ROM, impaired endurance, impaired functional mobility, decreased upper extremity function, pain, gait instability, impaired cognition, orthopedic precautions.  Due to pt's age, cognition, and right humeral fracture she will most likely require 24-hour care at discharge.    Rehab Prognosis: Fair; patient would benefit from acute skilled OT services to address these deficits and reach maximum level of function.     Pain/Comfort:  Pain Rating 1: 4/10  Location - Side 1: Right  Location 1: shoulder  Pain Addressed 1: Reposition, Distraction, Other (see comments) (Performed therapeutic massage to right scapular region followed by AAROM right elbow wrist and hand.)    Blood Pressure:        Plan:     Patient to be seen 5 x/week to address the above listed problems via self-care/home management, therapeutic activities, therapeutic exercises  Plan of Care Reviewed with: patient      GOALS:   Multidisciplinary Problems        Occupational Therapy Goals          Problem: Occupational Therapy    Goal Priority Disciplines Outcome Interventions   Occupational Therapy Goal     OT, PT/OT     Description:   Patient will increase functional independence with ADLs by performing:    UE Dressing with Minimal Assistance.  LE Dressing with Minimal Assistance.  Grooming while seated with Stand-by Assistance.  Toileting from toilet with Contact Guard Assistance for hygiene and clothing management.   Toilet transfer to toilet with Stand-by Assistance.                             Patient left supine with all lines intact, call button in reach, and bed alarm on      Recommendations:     Discharge Recommendations:  24-hour care.  Discharge Equipment Recommendations:   (TBD)  Barriers to discharge:  Decreased caregiver support      Time Tracking:     OT Date of Treatment: 01/28/25  OT Start Time: 0907  OT Stop Time: 0955  OT Total Time (min): 48 min    Billable Minutes:Evaluation 30  Self Care/Home Management 18      1/28/2025

## 2025-01-29 LAB
ALBUMIN SERPL-MCNC: 3 G/DL (ref 3.4–4.8)
ALBUMIN/GLOB SERPL: 0.8 RATIO (ref 1.1–2)
ALP SERPL-CCNC: 75 UNIT/L (ref 40–150)
ALT SERPL-CCNC: 14 UNIT/L (ref 0–55)
ANION GAP SERPL CALC-SCNC: 10 MEQ/L
AST SERPL-CCNC: 17 UNIT/L (ref 5–34)
BASOPHILS # BLD AUTO: 0.07 X10(3)/MCL
BASOPHILS NFR BLD AUTO: 0.9 %
BILIRUB SERPL-MCNC: 0.5 MG/DL
BUN SERPL-MCNC: 28 MG/DL (ref 9.8–20.1)
CALCIUM SERPL-MCNC: 9.6 MG/DL (ref 8.4–10.2)
CHLORIDE SERPL-SCNC: 100 MMOL/L (ref 98–107)
CO2 SERPL-SCNC: 29 MMOL/L (ref 23–31)
CREAT SERPL-MCNC: 0.79 MG/DL (ref 0.55–1.02)
CREAT/UREA NIT SERPL: 35
EOSINOPHIL # BLD AUTO: 0.23 X10(3)/MCL (ref 0–0.9)
EOSINOPHIL NFR BLD AUTO: 3 %
ERYTHROCYTE [DISTWIDTH] IN BLOOD BY AUTOMATED COUNT: 17.9 % (ref 11.5–17)
GFR SERPLBLD CREATININE-BSD FMLA CKD-EPI: >60 ML/MIN/1.73/M2
GLOBULIN SER-MCNC: 3.7 GM/DL (ref 2.4–3.5)
GLUCOSE SERPL-MCNC: 267 MG/DL (ref 82–115)
HCT VFR BLD AUTO: 28.8 % (ref 37–47)
HGB BLD-MCNC: 8.8 G/DL (ref 12–16)
IMM GRANULOCYTES # BLD AUTO: 0.04 X10(3)/MCL (ref 0–0.04)
IMM GRANULOCYTES NFR BLD AUTO: 0.5 %
LYMPHOCYTES # BLD AUTO: 2.54 X10(3)/MCL (ref 0.6–4.6)
LYMPHOCYTES NFR BLD AUTO: 32.7 %
MCH RBC QN AUTO: 24.7 PG (ref 27–31)
MCHC RBC AUTO-ENTMCNC: 30.6 G/DL (ref 33–36)
MCV RBC AUTO: 80.9 FL (ref 80–94)
MONOCYTES # BLD AUTO: 0.74 X10(3)/MCL (ref 0.1–1.3)
MONOCYTES NFR BLD AUTO: 9.5 %
NEUTROPHILS # BLD AUTO: 4.15 X10(3)/MCL (ref 2.1–9.2)
NEUTROPHILS NFR BLD AUTO: 53.4 %
NRBC BLD AUTO-RTO: 0 %
PLATELET # BLD AUTO: 278 X10(3)/MCL (ref 130–400)
PMV BLD AUTO: 9.4 FL (ref 7.4–10.4)
POCT GLUCOSE: 177 MG/DL (ref 70–110)
POCT GLUCOSE: 250 MG/DL (ref 70–110)
POCT GLUCOSE: 284 MG/DL (ref 70–110)
POCT GLUCOSE: 314 MG/DL (ref 70–110)
POCT GLUCOSE: 316 MG/DL (ref 70–110)
POCT GLUCOSE: 401 MG/DL (ref 70–110)
POCT GLUCOSE: 409 MG/DL (ref 70–110)
POTASSIUM SERPL-SCNC: 4.3 MMOL/L (ref 3.5–5.1)
PROT SERPL-MCNC: 6.7 GM/DL (ref 5.8–7.6)
RBC # BLD AUTO: 3.56 X10(6)/MCL (ref 4.2–5.4)
SODIUM SERPL-SCNC: 139 MMOL/L (ref 136–145)
WBC # BLD AUTO: 7.77 X10(3)/MCL (ref 4.5–11.5)

## 2025-01-29 PROCEDURE — 63600175 PHARM REV CODE 636 W HCPCS: Performed by: INTERNAL MEDICINE

## 2025-01-29 PROCEDURE — 80053 COMPREHEN METABOLIC PANEL: CPT | Performed by: INTERNAL MEDICINE

## 2025-01-29 PROCEDURE — 85025 COMPLETE CBC W/AUTO DIFF WBC: CPT | Performed by: INTERNAL MEDICINE

## 2025-01-29 PROCEDURE — 92523 SPEECH SOUND LANG COMPREHEN: CPT

## 2025-01-29 PROCEDURE — 36415 COLL VENOUS BLD VENIPUNCTURE: CPT | Performed by: INTERNAL MEDICINE

## 2025-01-29 PROCEDURE — 97535 SELF CARE MNGMENT TRAINING: CPT

## 2025-01-29 PROCEDURE — 25000003 PHARM REV CODE 250: Performed by: INTERNAL MEDICINE

## 2025-01-29 PROCEDURE — 97530 THERAPEUTIC ACTIVITIES: CPT

## 2025-01-29 PROCEDURE — 97116 GAIT TRAINING THERAPY: CPT

## 2025-01-29 PROCEDURE — 97166 OT EVAL MOD COMPLEX 45 MIN: CPT

## 2025-01-29 PROCEDURE — 11000004 HC SNF PRIVATE

## 2025-01-29 RX ORDER — INSULIN GLARGINE 100 [IU]/ML
20 INJECTION, SOLUTION SUBCUTANEOUS NIGHTLY
Status: DISCONTINUED | OUTPATIENT
Start: 2025-01-29 | End: 2025-01-30

## 2025-01-29 RX ORDER — HYDROCODONE BITARTRATE AND ACETAMINOPHEN 5; 325 MG/1; MG/1
1 TABLET ORAL EVERY 6 HOURS PRN
Status: DISCONTINUED | OUTPATIENT
Start: 2025-01-29 | End: 2025-02-07 | Stop reason: HOSPADM

## 2025-01-29 RX ORDER — MICONAZOLE NITRATE 2 G/100G
POWDER TOPICAL 2 TIMES DAILY
Status: DISCONTINUED | OUTPATIENT
Start: 2025-01-29 | End: 2025-02-07 | Stop reason: HOSPADM

## 2025-01-29 RX ADMIN — INSULIN ASPART 5 UNITS: 100 INJECTION, SOLUTION INTRAVENOUS; SUBCUTANEOUS at 07:01

## 2025-01-29 RX ADMIN — ALUMINUM HYDROXIDE, MAGNESIUM HYDROXIDE, AND DIMETHICONE 30 ML: 200; 20; 200 SUSPENSION ORAL at 06:01

## 2025-01-29 RX ADMIN — LEVOTHYROXINE SODIUM 100 MCG: 0.1 TABLET ORAL at 05:01

## 2025-01-29 RX ADMIN — INSULIN GLARGINE 20 UNITS: 100 INJECTION, SOLUTION SUBCUTANEOUS at 08:01

## 2025-01-29 RX ADMIN — INSULIN ASPART 5 UNITS: 100 INJECTION, SOLUTION INTRAVENOUS; SUBCUTANEOUS at 06:01

## 2025-01-29 RX ADMIN — MEMANTINE 10 MG: 5 TABLET ORAL at 08:01

## 2025-01-29 RX ADMIN — LISINOPRIL 5 MG: 5 TABLET ORAL at 10:01

## 2025-01-29 RX ADMIN — ALUMINUM HYDROXIDE, MAGNESIUM HYDROXIDE, AND DIMETHICONE 30 ML: 200; 20; 200 SUSPENSION ORAL at 05:01

## 2025-01-29 RX ADMIN — INSULIN ASPART 5 UNITS: 100 INJECTION, SOLUTION INTRAVENOUS; SUBCUTANEOUS at 12:01

## 2025-01-29 RX ADMIN — ATORVASTATIN CALCIUM 20 MG: 10 TABLET, FILM COATED ORAL at 10:01

## 2025-01-29 RX ADMIN — ALUMINUM HYDROXIDE, MAGNESIUM HYDROXIDE, AND DIMETHICONE 30 ML: 200; 20; 200 SUSPENSION ORAL at 08:01

## 2025-01-29 RX ADMIN — MEMANTINE 10 MG: 5 TABLET ORAL at 10:01

## 2025-01-29 RX ADMIN — MICONAZOLE NITRATE: 20 POWDER TOPICAL at 08:01

## 2025-01-29 RX ADMIN — ALUMINUM HYDROXIDE, MAGNESIUM HYDROXIDE, AND DIMETHICONE 30 ML: 200; 20; 200 SUSPENSION ORAL at 10:01

## 2025-01-29 RX ADMIN — MICONAZOLE NITRATE: 20 POWDER TOPICAL at 12:01

## 2025-01-29 RX ADMIN — HYDROCODONE BITARTRATE AND ACETAMINOPHEN 1 TABLET: 5; 325 TABLET ORAL at 10:01

## 2025-01-29 RX ADMIN — HYDROCODONE BITARTRATE AND ACETAMINOPHEN 1 TABLET: 5; 325 TABLET ORAL at 05:01

## 2025-01-29 RX ADMIN — ENOXAPARIN SODIUM 40 MG: 40 INJECTION SUBCUTANEOUS at 06:01

## 2025-01-29 NOTE — PT/OT/SLP EVAL
"[facility]  Speech Language Pathology   Cognitive Communication Evaluation    Patient Name:  Brittany Nunn   MRN:  [id number]  Admitting Diagnosis: Physical deconditioning    No past medical history on file.    No past surgical history on file.    Social History: Patient lives with her  (89 years old) and grandson who reportedly is not much help and "stays in his room all the time." Her grand sons mother (aka her ex daughter in law) lives at the home on and off, but suffers with mental health difficulties.     Occupation/hobbies/homemaking: Pt stays home and she and her  "take care of each other." Pt's family reports she "doesn't move as much as she should" when at home. Pt's daughter also reports she is concerned about her parents' living environment and that it is likely time for a change as they need more help.     Respiratory Status: room air    Recommendations:                 General Recommendations:  Cognitive-linguistic therapy/ dementia education/training  Diet recommendations:  Easy to Chew Diet - IDDSI Level 7, Thin liquids - IDDSI Level 0   Aspiration Precautions:  n/a    General Precautions: Standard, fall (confusion / dementia)  Communication strategies:   reduce distractions, ensure Pt can hear the speaker    Subjective     Patient reported: Pt reported no recent confusion (reduced awareness and insight noted by SLP)  Patient goals: "get home"   Behavioral observations: Pt participated well and demonstrated good use of humor at times. Reduced awareness and insight is evident in less structured tasks. Pt performs well in structured tasks, but breakdown is evident in less structured/functional situations.     Pain/Comfort:   No pain reported    Objective:     Orientation: O x4 (with use of board for time)  General Attention: redirection to topic required, difficulty with task completion with more complex tasks    LANGUAGE:   Receptive Language:  Simple y/n Questions: " 100%  Complex y/n Questions: 100%  Paragraph Comprehension: 67%  Basic conversation: repetition and redirection required; some difficulty may be due to hearing loss    Expressive Language:   Automatic Speech: 100%  Repetition: 100%  Naming items: 100%  Item Function: 100%  WH questions: 100%  Divergent Naming: 10/10 concrete  Basic conversation/Macrolinguistics: reduced cohesion/coherence, structure, content     COGNITION:  Long Term Memory: 75% - word finding deficits noted  Memory Impairment Screen (MIS): 6/8 (<=4: possible cognitive deficit likely)  Recall/ Repetition: 75%, reduced at 4 unit 1-2 digit length; 50% with moderately complex sentences  Working Memory/backwards span: 25%, reduced at 3 single digit length  Verbal Problem Solvin%  Insight and Awareness: reduced  Pragmatics: inconsistently reduced eye contact and flat affect  Further assessment warranted.      Assessment:   Brittany Nunn is a 89 y.o. 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  She presents with reduced awareness and insight for adequate problem solving/reasoning, reduced receptive and expressive language skills at mod-complex levels.    Goals:   Multidisciplinary Problems       SLP Goals          Problem: SLP    Goal Priority Disciplines Outcome   SLP Goal     SLP    Description: LTGs:  Pt will demonstrate improved recall and use of strategies for improved communication, safety, and participation in ADLs.    STGs:  Pt/family will participate in dementia education/training and complete teach-back for improved communication and strategy use to increase safety and participation in ADLs.   Pt will demonstrate recall and use of strategies with min cues for improved communication, safety, and participation in ADLs.                        Plan:   Con't POC. Further evaluate functional language and cognition. Staff and family education is warranted.   Patient to  be seen:   (3-5x/week)   Plan of Care expires:     Plan of Care reviewed with:  patient, daughter   SLP Follow-Up:  Yes       Discharge recommendations:    nursing home or 24 hours caregivers  Barriers to Discharge:  Level of Skilled Assistance Needed   and Safety Awareness      Time Tracking:   SLP Treatment Date:   01/29/25  Speech Start Time:  0905  Speech Stop Time:  0930     Speech Total Time (min):  25 min    Billable Minutes: Eval 25 in / 1 unit       Bebe Pappas MA, CCC-SLP  01/29/2025

## 2025-01-29 NOTE — ASSESSMENT & PLAN NOTE
Nutrition consulted. Most recent weight and BMI monitored-     Measurements:  Wt Readings from Last 1 Encounters:   01/28/25 69 kg (152 lb 1.9 oz)   Body mass index is 25.31 kg/m².    Patient has been screened and assessed by RD.    Malnutrition Type:  Context: acute illness or injury  Level: moderate    Malnutrition Characteristic Summary:  Weight Loss (Malnutrition): other (see comments) (Does not meet criteria)  Energy Intake (Malnutrition): less than or equal to 50% for greater than or equal to 5 days  Subcutaneous Fat (Malnutrition): mild depletion  Muscle Mass (Malnutrition): mild depletion  Fluid Accumulation (Malnutrition): other (see comments) (Not present)  Hand  Strength, Right (Malnutrition): Measurably reduced for age/gender    Interventions/Recommendations (treatment strategy):  Oral nutritional supplement;Care coordination or referral;Feeding assistance/management;Oral diet/nutrient modifications

## 2025-01-29 NOTE — PT/OT/SLP PROGRESS
Occupational Therapy  Treatment    Name: Brittany Nunn    : 1935 (89 y.o.)  MRN: [id number]          TREATMENT SUMMARY AND RECOMMENDATIONS:      Subjective Assessment:   No complaints  Lethargic   x Awake, alert, cooperative  Uncooperative    Agitated x Flat affect    Appropriate  c/o fatigue    Confused x Treated at bedside     Emotionally liable  Treated in gym/dept.    Impulsive x Other: Pt reported sleeping well last night.       Pain/Comfort:  Pain Rating 1: 3/10  Location - Side 1: Right  Location 1: shoulder  Pain Addressed 1: Reposition, Distraction      Therapy Precautions:   x Cognitive deficits  Spinal precautions    Collar - hard  Sternal precautions    Collar - soft   TLSO   x Fall risk  LSO    Hip precautions - posterior  Knee immobilizer    Hip precautions - anterior  WBAT    Impaired communication  Partial weightbearing    Oxygen  TTWB    PEG tube RUE NWB    Visual deficits x Other:RUE sling.    Hearing deficits           Vitals Value   x Room air      Oxygen (L)     Blood pressure     Heart rate         Treatment Objectives:     Mobility Training:    Mobility task Assist level Comments    Bed mobility     Balance training     Transfer     Functional mobility     Sit to stand transitions     Other:       ADL Training:    ADL Assist level Comments    Feeding     Grooming/hygiene Min ADL grooming training performed sitting in recliner.  After setup, pt able to wash face and perform oral care with LUE.  Pt required assistance with hair grooming secondary to tangles.   Bathing Max ADL bathing training with sponge bath sitting in recliner.  After setup, pt able to bathe trunk and bilateral thighs.  Assist required with remaining.  Noted strong odor in right axillary region.  Cleaned dried area well and discussed with Dr. Rapp about using some antifungal powder, she will inform nursing.   Upper body dressing Max Pt required max assist to [facility] gown.   Lower body dressing Max Pt  required max assist to doff/elgin slipper socks.   Toileting     Toilet transfer     Adaptive equipment training     Other: x Applied lotion after bathing to back, BUEs, and BLEs to relieve itching from dry skin.       Additional Treatment:  Case conference held with pt, dgtr/via phone, and care team.  OT discussed pt's level of care and recommended 24-hour care at [place].  All questions answered.  After tx session, reinforced call bell function and call before you fall.    Assessment: Patient tolerated session fair.  Pt tolerating being OOB in recliner.  Pt presently requires max assist with bathing and dressing secondary to RUE fracture.  Pt will benefit from cont PLC to address deficits with goal of achieving max functional abilities.     OT Plan: Cont POC      GOALS:   Multidisciplinary Problems       Occupational Therapy Goals          Problem: Occupational Therapy    Goal Priority Disciplines Outcome Interventions   Occupational Therapy Goal     OT, PT/OT Progressing    Description:   Patient will increase functional independence with ADLs by performing:    LE Dressing with Minimal Assistance.  Toileting from bedside commode with Contact Guard Assistance for hygiene and clothing management.   Bathing from  shower chair/bench with Minimal Assistance.  Toilet transfer to bedside commode with Contact Guard Assistance.                         Communication with Treatment Team:     Discharge Recommendations:    Discharge Equipment Recommendations:   (TBD)  Barriers to discharge:  Decreased caregiver support      At end of treatment, patient remained:  x Up in chair     In bed   x With alarm activated    Bed rails up   x Call bell in reach     Family/friends present    Restraints secured properly    In bathroom with CNA/RN notified    In gym with PT/PTA/tech   x Nurse aware    Other:         OT Date of Treatment: 01/29/25  OT Start Time: 0930  OT Stop Time: 0958  OT Total Time (min): 28 min    Billable Minutes:Self  Care/Home Management 28 1/29/2025

## 2025-01-29 NOTE — PROGRESS NOTES
[facility] Medical Surgical Unit  [facility] Medicine  Progress Note    Patient Name: Brittany Nunn  MRN: [id number]  Patient Class: IP- Swing   Admission Date: 1/27/2025  Length of Stay: 2 days  Attending Physician: Radha Rapp DO  Primary Care Provider: Ester, Primary Doctor        Subjective     Principal Problem:Physical deconditioning        HPI:  88 yo female admitted to swing bed today for continued therapy.  She is s/p a fall at home in which she tripped and fell on her right shoulder.  No LOC. She was sent to [facility] where she was found to have an impacted humeral head/neck fracture with some mild subluxation.  This was determined to be non-surgical.  She does c/o pain to her shoulder otherwise she stats she is calm.  Currently no N/V/D/C.  No fever/chills.  NO chest pian/SOB.  She will be admitted for continued PT/OT services and pain control.    Overview/Hospital Course:  1/28/25: Pt was in the process of changing rooms when I saw her this morning. She was nauseous due to her right arm pain. She had vomited a small amount of bile. She denied any cp or sob. I spoke to her daughter this afternoon. She lives in [place]. There is a brother that has passed away, and another brother that lives in [place]. She states that they have been noticing a decline of their parents over the last year and are preparing for the fact that their living situation likely needs to change. She is available by phone if we have any questions. She states that the pt was using a walker, possibly daily, for ambulation. They were eating microwavable foods, mostly. For groceries they do grocery . Also states that since her TIA, the pt has had some urinary incontinence, requiring the use of some type of padding/diaper.    1/29/25: PT sitting up in her recliner during multidisciplinary rounds meeting. Daughter was on the phone. OT/ST is recommending 24 hour care for safety and ADLs. Daughter understands and will start  working on this. Pt denies sob, cp, abd pain, constipation. Last bm yesterday. Normally has a bm oqd. She is only complaining of right arm pain.     Review of Systems   Constitutional:  Positive for activity change. Negative for appetite change and fever.   Respiratory:  Negative for chest tightness and shortness of breath.    Cardiovascular:  Negative for chest pain and leg swelling.   Gastrointestinal:  Negative for abdominal distention, abdominal pain, constipation, diarrhea, nausea and vomiting.   Musculoskeletal:  Positive for arthralgias and gait problem.   Skin:  Negative for rash and wound.     Objective:     Vital Signs (Most Recent):  Temp: 97.8 °F (36.6 °C) (01/29/25 0753)  Pulse: 99 (01/29/25 0753)  Resp: 18 (01/29/25 1038)  BP: (!) 101/58 (01/29/25 0753)  SpO2: 95 % (01/29/25 0753) Vital Signs (24h Range):  Temp:  [97.8 °F (36.6 °C)-98.2 °F (36.8 °C)] 97.8 °F (36.6 °C)  Pulse:  [80-99] 99  Resp:  [16-18] 18  SpO2:  [95 %-97 %] 95 %  BP: ()/(54-58) 101/58     Weight: 69 kg (152 lb 1.9 oz)  Body mass index is 25.31 kg/m².    Intake/Output Summary (Last 24 hours) at 1/29/2025 1451  Last data filed at 1/29/2025 1200  Gross per 24 hour   Intake 1140 ml   Output --   Net 1140 ml         Physical Exam  Vitals reviewed. Exam conducted with a chaperone present.   Constitutional:       General: She is not in acute distress.     Appearance: Normal appearance. She is not ill-appearing.   HENT:      Head: Normocephalic and atraumatic.      Nose: Nose normal.   Eyes:      Conjunctiva/sclera: Conjunctivae normal.   Cardiovascular:      Rate and Rhythm: Normal rate and regular rhythm.      Pulses: Normal pulses.      Heart sounds: No murmur heard.  Pulmonary:      Effort: Pulmonary effort is normal.      Breath sounds: Normal breath sounds. No wheezing, rhonchi or rales.   Abdominal:      General: Bowel sounds are normal. There is no distension.      Palpations: Abdomen is soft.      Tenderness: There is no  abdominal tenderness. There is no guarding.   Musculoskeletal:         General: No swelling.      Cervical back: Normal range of motion and neck supple.      Right lower leg: No edema.      Left lower leg: No edema.      Comments: Right arm in a sling. Decreased rom and pain.   Skin:     General: Skin is warm and dry.      Findings: No rash.   Neurological:      Mental Status: She is alert and oriented to person, place, and time. Mental status is at baseline.      Gait: Gait abnormal.   Psychiatric:         Mood and Affect: Mood normal.         Thought Content: Thought content normal.         Judgment: Judgment normal.             Significant Labs: All pertinent labs within the past 24 hours have been reviewed.  Recent Lab Results  (Last 5 results in the past 24 hours)        01/29/25  1047   01/29/25  0608   01/29/25  0448   01/28/25 2029 01/28/25  1650        Albumin/Globulin Ratio     0.8           Albumin     3.0           ALP     75           ALT     14           Anion Gap     10.0           AST     17           Baso #     0.07           Basophil %     0.9           BILIRUBIN TOTAL     0.5           BUN     28.0           BUN/CREAT RATIO     35           Calcium     9.6           Chloride     100           CO2     29           Creatinine     0.79           eGFR     >60  Comment: Estimated GFR calculated using the CKD-EPI creatinine (2021) equation.           Eos #     0.23           Eos %     3.0           Globulin, Total     3.7           Glucose     267           Hematocrit     28.8           Hemoglobin     8.8           Immature Grans (Abs)     0.04           Immature Granulocytes     0.5           Lymph #     2.54           LYMPH %     32.7           MCH     24.7           MCHC     30.6           MCV     80.9           Mono #     0.74           Mono %     9.5           MPV     9.4           Neut #     4.15           Neut %     53.4           nRBC     0.0           Platelet Count     278           POCT  Glucose 409   284     314   250       Potassium     4.3           PROTEIN TOTAL     6.7           RBC     3.56           RDW     17.9           Sodium     139           WBC     7.77                                  Significant Imaging: I have reviewed all pertinent imaging results/findings within the past 24 hours.    Assessment and Plan     * Physical deconditioning  Admit to swing bed  OOB  PT/OT  Resume transfer meds  DVT prophylaxis  Follow labs      Type 2 diabetes mellitus, with long-term current use of insulin  Resume lantus and aspart 5 units tid.  1/29/25: Increase lantus to 20units. CBGs >400. Continue to monitor.    Closed fracture of proximal end of right humerus  PT/OT  Recs as per ortho      Moderate malnutrition  Nutrition consulted. Most recent weight and BMI monitored-     Measurements:  Wt Readings from Last 1 Encounters:   01/28/25 69 kg (152 lb 1.9 oz)   Body mass index is 25.31 kg/m².    Patient has been screened and assessed by RD.    Malnutrition Type:  Context: acute illness or injury  Level: moderate    Malnutrition Characteristic Summary:  Weight Loss (Malnutrition): other (see comments) (Does not meet criteria)  Energy Intake (Malnutrition): less than or equal to 50% for greater than or equal to 5 days  Subcutaneous Fat (Malnutrition): mild depletion  Muscle Mass (Malnutrition): mild depletion  Fluid Accumulation (Malnutrition): other (see comments) (Not present)  Hand  Strength, Right (Malnutrition): Measurably reduced for age/gender    Interventions/Recommendations (treatment strategy):  Oral nutritional supplement;Care coordination or referral;Feeding assistance/management;Oral diet/nutrient modifications        VTE Risk Mitigation (From admission, onward)           Ordered     enoxaparin injection 40 mg  Daily         01/27/25 4318                    Discharge Planning   ISATU:      Code Status: Full Code   Medical Readiness for Discharge Date:   Discharge Plan A: Home with family,  Home Health                Please place Justification for DME        FATMATA ARANDA DO  Department of Hospital Medicine   [facility] - Medical Surgical Unit

## 2025-01-29 NOTE — PT/OT/SLP PROGRESS
Physical Therapy Treatment Note           Name: Brittany Nunn    : 1935 (89 y.o.)  MRN: [id number]             Subjective Assessment:     No complaints  Lethargic   X Awake, alert, cooperative  Uncooperative    Agitated X c/o pain    Appropriate X c/o fatigue   X Confused  Treated at bedside     Emotionally labile  Treated in gym/dept.    Impulsive  Other:    Flat affect       Pain/Comfort:    Location - Side 1: Right  Location 1: shoulder    Therapy Precautions:     X Cognitive deficits  Spinal precautions    Collar - hard  Sternal precautions    Collar - soft   TLSO   X Fall risk  LSO    Hip precautions - posterior  Knee immobilizer    Hip precautions - anterior  WBAT    Impaired communication  Partial weightbearing    Oxygen  TTWB    PEG tube X NWB RUE in sling    Visual deficits  Other:    Hearing deficits               Mobility Training:     Assist Level Assistive Device Comments    Bed Mobility      Sit to stand/Stand to sit Martinez  Sit to stand/stand to sit performed from recliner level and from toilet level. When standing from the toilet, pt utilized the grab bar mounted to the wall.  Cues provided for hand placement during both ascent and descent, especially when utilizing the SBQC.   Transfers Martinez  Pt performed a toilet t/f to attempt a void. Pt was unsuccessful.  Some assistance was provided with brief management prior to pt returning to the bedside recliner.     Gait CGA SBQC Pt able to ambulate 150 ft with a step through gait pattern and slow gait speed.  Pt demonstrated improved posture initially, but became more flexed at the waist with the onset of fatigue.  Pt was able to more appropriate utilize the SBQC as compared to initial evaluation.  Overall, pt's participation was much improved.  No coaxing required for participation.   Balance Training      Wheelchair Mobility      Stair Climbing      Car Transfer      Other:           Assessment:     Patient tolerated session fairly well  and was much more alert and participatory compared to yesterday. Pt's pain appeared to be more well controlled which seemed to contribute to pt's improved participation.  Upon conclusion of treatment, pt's lunch arrived and pt was set up with her needs in reach.  Discussed D/C planning via speakerphone with pt's daughter during today's interdisciplinary meeting.  24 hour care has been recommended upon D/C from this facility.          GOALS:   Multidisciplinary Problems       Physical Therapy Goals          Problem: Physical Therapy    Goal Priority Disciplines Outcome Interventions   Physical Therapy Goal     PT, PT/OT Progressing    Description: Patient will increase functional independence with mobility by performin. Supine to sit with Stand-by Assistance  2. Sit to supine with Stand-by Assistance  3. Sit to stand transfer with Stand-by Assistance  4. Gait  x 75 feet with Stand-by Assistance using No Assistive Device vs SQBC.                            Discharge Recommendations:      24 hour care    Discharge Equipment Recommendations:      (TBD)     At end of treatment, patient remained:     X Up in chair     In bed   X With alarm activated    Bed rails up   X Call bell in reach      Family/friends present     Restraints secured properly     In bathroom with CNA/RN notified     In gym with PT/PTA/tech     Nurse aware     Other:           PT Start Time: 1041     PT Stop Time: 1124  PT Total Time (min): 43 min     Billable Minutes: Gait Training 23 and Therapeutic Activity 20      2025

## 2025-01-29 NOTE — SUBJECTIVE & OBJECTIVE
Review of Systems   Constitutional:  Positive for activity change. Negative for appetite change and fever.   Respiratory:  Negative for chest tightness and shortness of breath.    Cardiovascular:  Negative for chest pain and leg swelling.   Gastrointestinal:  Negative for abdominal distention, abdominal pain, constipation, diarrhea, nausea and vomiting.   Musculoskeletal:  Positive for arthralgias and gait problem.   Skin:  Negative for rash and wound.     Objective:     Vital Signs (Most Recent):  Temp: 97.8 °F (36.6 °C) (01/29/25 0753)  Pulse: 99 (01/29/25 0753)  Resp: 18 (01/29/25 1038)  BP: (!) 101/58 (01/29/25 0753)  SpO2: 95 % (01/29/25 0753) Vital Signs (24h Range):  Temp:  [97.8 °F (36.6 °C)-98.2 °F (36.8 °C)] 97.8 °F (36.6 °C)  Pulse:  [80-99] 99  Resp:  [16-18] 18  SpO2:  [95 %-97 %] 95 %  BP: ()/(54-58) 101/58     Weight: 69 kg (152 lb 1.9 oz)  Body mass index is 25.31 kg/m².    Intake/Output Summary (Last 24 hours) at 1/29/2025 1451  Last data filed at 1/29/2025 1200  Gross per 24 hour   Intake 1140 ml   Output --   Net 1140 ml         Physical Exam  Vitals reviewed. Exam conducted with a chaperone present.   Constitutional:       General: She is not in acute distress.     Appearance: Normal appearance. She is not ill-appearing.   HENT:      Head: Normocephalic and atraumatic.      Nose: Nose normal.   Eyes:      Conjunctiva/sclera: Conjunctivae normal.   Cardiovascular:      Rate and Rhythm: Normal rate and regular rhythm.      Pulses: Normal pulses.      Heart sounds: No murmur heard.  Pulmonary:      Effort: Pulmonary effort is normal.      Breath sounds: Normal breath sounds. No wheezing, rhonchi or rales.   Abdominal:      General: Bowel sounds are normal. There is no distension.      Palpations: Abdomen is soft.      Tenderness: There is no abdominal tenderness. There is no guarding.   Musculoskeletal:         General: No swelling.      Cervical back: Normal range of motion and neck supple.       Right lower leg: No edema.      Left lower leg: No edema.      Comments: Right arm in a sling. Decreased rom and pain.   Skin:     General: Skin is warm and dry.      Findings: No rash.   Neurological:      Mental Status: She is alert and oriented to person, place, and time. Mental status is at baseline.      Gait: Gait abnormal.   Psychiatric:         Mood and Affect: Mood normal.         Thought Content: Thought content normal.         Judgment: Judgment normal.             Significant Labs: All pertinent labs within the past 24 hours have been reviewed.  Recent Lab Results  (Last 5 results in the past 24 hours)        01/29/25  1047   01/29/25  0608   01/29/25  0448   01/28/25  2029 01/28/25  1650        Albumin/Globulin Ratio     0.8           Albumin     3.0           ALP     75           ALT     14           Anion Gap     10.0           AST     17           Baso #     0.07           Basophil %     0.9           BILIRUBIN TOTAL     0.5           BUN     28.0           BUN/CREAT RATIO     35           Calcium     9.6           Chloride     100           CO2     29           Creatinine     0.79           eGFR     >60  Comment: Estimated GFR calculated using the CKD-EPI creatinine (2021) equation.           Eos #     0.23           Eos %     3.0           Globulin, Total     3.7           Glucose     267           Hematocrit     28.8           Hemoglobin     8.8           Immature Grans (Abs)     0.04           Immature Granulocytes     0.5           Lymph #     2.54           LYMPH %     32.7           MCH     24.7           MCHC     30.6           MCV     80.9           Mono #     0.74           Mono %     9.5           MPV     9.4           Neut #     4.15           Neut %     53.4           nRBC     0.0           Platelet Count     278           POCT Glucose 409   284     314   250       Potassium     4.3           PROTEIN TOTAL     6.7           RBC     3.56           RDW     17.9           Sodium      139           WBC     7.77                                  Significant Imaging: I have reviewed all pertinent imaging results/findings within the past 24 hours.

## 2025-01-29 NOTE — ASSESSMENT & PLAN NOTE
Resume lantus and aspart 5 units tid.  1/29/25: Increase lantus to 20units. CBGs >400. Continue to monitor.

## 2025-01-29 NOTE — PT/OT/SLP PROGRESS
"         Physical Therapy Treatment Note           Name: Brittany Nunn    : 1935 (89 y.o.)  MRN: [id number]             Subjective Assessment:     No complaints  Lethargic   X Awake, alert, cooperative  Uncooperative    Agitated X c/o pain    Appropriate X c/o fatigue   X Confused  Treated at bedside     Emotionally labile  Treated in gym/dept.    Impulsive  Other:    Flat affect       Pain/Comfort:    Location - Side 1: Right  Location 1: shoulder    Therapy Precautions:     X Cognitive deficits  Spinal precautions    Collar - hard  Sternal precautions    Collar - soft   TLSO   X Fall risk  LSO    Hip precautions - posterior  Knee immobilizer    Hip precautions - anterior  WBAT    Impaired communication  Partial weightbearing    Oxygen  TTWB    PEG tube X NWB RUE in sling    Visual deficits  Other:    Hearing deficits               Mobility Training:     Assist Level Assistive Device Comments    Bed Mobility SBA  Sitting EOB to supine.  Pt was able to lay into the bed without any assistance from PT.     Sit to stand/Stand to sit Martinez SBQC Sit to stand/stand to sit. Pt stood from recliner level and from toilet level.     Transfers Martinez  Functional mobility performed from the recliner to the restroom and back to the EOB.  Pt utilized the SBQC during this short distance mobility. Pt appeared quite guarded and steps were more shuffled as compared to this AM.  Pt attempted a BM but was not successful. Pt reported feeling "impacted".  Nsg notified.  PT claimed to have not been able to urinate, but PT noted a fair amount of urine in the toilet.  Pt continues to require assistance with brief management.     Gait      Balance Training      Wheelchair Mobility      Stair Climbing      Car Transfer      Other:             Assessment:     PT was contacted by nsg stating that pt was asking to return to bed.  Upon PT arrival, pt stated that she was waiting for assistance to use the restroom.  Pt reported the inability " "to void, but urine was noted in the toilet. Pt was unable to have a BM and pt's nurse was notified of pt's reports of "feeling impacted".  Following mobility to and from the restroom, pt returned to supine in bed.  Pt expressed the inability to "figure out her phone" and stated that she had been trying to reach her .  PT dialed pt's phone and it was placed on speaker phone per pt's request.        GOALS:   Multidisciplinary Problems       Physical Therapy Goals          Problem: Physical Therapy    Goal Priority Disciplines Outcome Interventions   Physical Therapy Goal     PT, PT/OT Progressing    Description: Patient will increase functional independence with mobility by performin. Supine to sit with Stand-by Assistance  2. Sit to supine with Stand-by Assistance  3. Sit to stand transfer with Stand-by Assistance  4. Gait  x 75 feet with Stand-by Assistance using No Assistive Device vs SQBC.                            Discharge Recommendations:      24 hour care    Discharge Equipment Recommendations:      (TBD)     At end of treatment, patient remained:      Up in chair    X In bed   X With alarm activated    Bed rails up   X Call bell in reach      Family/friends present     Restraints secured properly     In bathroom with CNA/RN notified     In gym with PT/PTA/tech     Nurse aware     Other:           PT Start Time: 1311     PT Stop Time: 1328  PT Total Time (min): 17 min     Billable Minutes: Therapeutic Activity 2025   "

## 2025-01-29 NOTE — PLAN OF CARE
Problem: Adult Inpatient Plan of Care  Goal: Plan of Care Review  1/29/2025 0531 by Cony Kapoor RN  Outcome: Progressing  1/29/2025 0357 by Cony Kapoor RN  Outcome: Progressing  Goal: Patient-Specific Goal (Individualized)  1/29/2025 0531 by Cony Kapoor RN  Outcome: Progressing  1/29/2025 0357 by Cony Kapoor RN  Outcome: Progressing  Goal: Absence of Hospital-Acquired Illness or Injury  1/29/2025 0531 by Cony Kapoor RN  Outcome: Progressing  1/29/2025 0357 by Cony Kapoor RN  Outcome: Progressing  Goal: Optimal Comfort and Wellbeing  1/29/2025 0531 by Cony Kapoor RN  Outcome: Progressing  1/29/2025 0357 by Cony Kapoor RN  Outcome: Progressing  Goal: Readiness for Transition of Care  1/29/2025 0531 by Cony Kapoor RN  Outcome: Progressing  1/29/2025 0357 by Cony Kapoor RN  Outcome: Progressing     Problem: Diabetes Comorbidity  Goal: Blood Glucose Level Within Targeted Range  1/29/2025 0531 by Cony Kapoor RN  Outcome: Progressing  1/29/2025 0357 by Cony Kapoor RN  Outcome: Progressing     Problem: Skin Injury Risk Increased  Goal: Skin Health and Integrity  1/29/2025 0531 by Cony Kapoor RN  Outcome: Progressing  1/29/2025 0357 by Cony Kapoor RN  Outcome: Progressing     Problem: Fall Injury Risk  Goal: Absence of Fall and Fall-Related Injury  1/29/2025 0531 by Cony Kapoor RN  Outcome: Progressing  1/29/2025 0357 by Cony Kapoor RN  Outcome: Progressing     Problem: Wound  Goal: Optimal Coping  1/29/2025 0531 by Cony Kapoor RN  Outcome: Progressing  1/29/2025 0357 by Cony Kapoor RN  Outcome: Progressing  Goal: Optimal Functional Ability  1/29/2025 0531 by Cony Kapoor RN  Outcome: Progressing  1/29/2025 0357 by Cony Kapoor RN  Outcome: Progressing  Goal: Absence of Infection Signs and Symptoms  1/29/2025 0531 by Cony Kapoor RN  Outcome: Progressing  1/29/2025 0357 by Antwon  LAUREL Donnelly  Outcome: Progressing  Goal: Improved Oral Intake  1/29/2025 0531 by Cony Kapoor RN  Outcome: Progressing  1/29/2025 0357 by Cony Kapoor RN  Outcome: Progressing  Goal: Optimal Pain Control and Function  1/29/2025 0531 by Cony Kapoor RN  Outcome: Progressing  1/29/2025 0357 by Cony Kapoor RN  Outcome: Progressing  Goal: Skin Health and Integrity  1/29/2025 0531 by Cony Kapoor RN  Outcome: Progressing  1/29/2025 0357 by Cony Kapoor RN  Outcome: Progressing  Goal: Optimal Wound Healing  1/29/2025 0531 by Cony Kapoor RN  Outcome: Progressing  1/29/2025 0357 by oCny Kapoor RN  Outcome: Progressing     Problem: Wound  Goal: Optimal Coping  1/29/2025 0531 by Cony Kapoor RN  Outcome: Progressing  1/29/2025 0357 by Cony Kapoor RN  Outcome: Progressing  Goal: Optimal Functional Ability  1/29/2025 0531 by Cony Kapoor RN  Outcome: Progressing  1/29/2025 0357 by Cony Kapoor RN  Outcome: Progressing  Goal: Absence of Infection Signs and Symptoms  1/29/2025 0531 by Cony Kapoor RN  Outcome: Progressing  1/29/2025 0357 by Cony Kapoor RN  Outcome: Progressing  Goal: Improved Oral Intake  1/29/2025 0531 by Cony Kapoor RN  Outcome: Progressing  1/29/2025 0357 by Cony Kapoor RN  Outcome: Progressing  Goal: Optimal Pain Control and Function  1/29/2025 0531 by Cony Kapoor RN  Outcome: Progressing  1/29/2025 0357 by Cony Kapoor RN  Outcome: Progressing  Goal: Skin Health and Integrity  1/29/2025 0531 by Cony Kapoor RN  Outcome: Progressing  1/29/2025 0357 by Cony Kapoor RN  Outcome: Progressing  Goal: Optimal Wound Healing  1/29/2025 0531 by Cony Kapoor RN  Outcome: Progressing  1/29/2025 0357 by Cony Kapoor RN  Outcome: Progressing

## 2025-01-30 LAB
ALBUMIN SERPL-MCNC: 3 G/DL (ref 3.4–4.8)
ALBUMIN/GLOB SERPL: 0.8 RATIO (ref 1.1–2)
ALP SERPL-CCNC: 74 UNIT/L (ref 40–150)
ALT SERPL-CCNC: 13 UNIT/L (ref 0–55)
ANION GAP SERPL CALC-SCNC: 6 MEQ/L
AST SERPL-CCNC: 15 UNIT/L (ref 5–34)
BASOPHILS # BLD AUTO: 0.07 X10(3)/MCL
BASOPHILS NFR BLD AUTO: 0.8 %
BILIRUB SERPL-MCNC: 0.6 MG/DL
BUN SERPL-MCNC: 27 MG/DL (ref 9.8–20.1)
CALCIUM SERPL-MCNC: 9.7 MG/DL (ref 8.4–10.2)
CHLORIDE SERPL-SCNC: 101 MMOL/L (ref 98–107)
CO2 SERPL-SCNC: 30 MMOL/L (ref 23–31)
CREAT SERPL-MCNC: 0.74 MG/DL (ref 0.55–1.02)
CREAT/UREA NIT SERPL: 36
EOSINOPHIL # BLD AUTO: 0.19 X10(3)/MCL (ref 0–0.9)
EOSINOPHIL NFR BLD AUTO: 2.2 %
ERYTHROCYTE [DISTWIDTH] IN BLOOD BY AUTOMATED COUNT: 18.4 % (ref 11.5–17)
GFR SERPLBLD CREATININE-BSD FMLA CKD-EPI: >60 ML/MIN/1.73/M2
GLOBULIN SER-MCNC: 3.6 GM/DL (ref 2.4–3.5)
GLUCOSE SERPL-MCNC: 233 MG/DL (ref 82–115)
HCT VFR BLD AUTO: 27.7 % (ref 37–47)
HGB BLD-MCNC: 8.4 G/DL (ref 12–16)
IMM GRANULOCYTES # BLD AUTO: 0.05 X10(3)/MCL (ref 0–0.04)
IMM GRANULOCYTES NFR BLD AUTO: 0.6 %
LYMPHOCYTES # BLD AUTO: 2.58 X10(3)/MCL (ref 0.6–4.6)
LYMPHOCYTES NFR BLD AUTO: 29.8 %
MCH RBC QN AUTO: 24.4 PG (ref 27–31)
MCHC RBC AUTO-ENTMCNC: 30.3 G/DL (ref 33–36)
MCV RBC AUTO: 80.5 FL (ref 80–94)
MONOCYTES # BLD AUTO: 0.81 X10(3)/MCL (ref 0.1–1.3)
MONOCYTES NFR BLD AUTO: 9.4 %
NEUTROPHILS # BLD AUTO: 4.95 X10(3)/MCL (ref 2.1–9.2)
NEUTROPHILS NFR BLD AUTO: 57.2 %
NRBC BLD AUTO-RTO: 0 %
PLATELET # BLD AUTO: 304 X10(3)/MCL (ref 130–400)
PMV BLD AUTO: 9.4 FL (ref 7.4–10.4)
POCT GLUCOSE: 190 MG/DL (ref 70–110)
POCT GLUCOSE: 230 MG/DL (ref 70–110)
POCT GLUCOSE: 266 MG/DL (ref 70–110)
POCT GLUCOSE: 302 MG/DL (ref 70–110)
POTASSIUM SERPL-SCNC: 4.3 MMOL/L (ref 3.5–5.1)
PROT SERPL-MCNC: 6.6 GM/DL (ref 5.8–7.6)
RBC # BLD AUTO: 3.44 X10(6)/MCL (ref 4.2–5.4)
SODIUM SERPL-SCNC: 137 MMOL/L (ref 136–145)
WBC # BLD AUTO: 8.65 X10(3)/MCL (ref 4.5–11.5)

## 2025-01-30 PROCEDURE — 63600175 PHARM REV CODE 636 W HCPCS: Performed by: INTERNAL MEDICINE

## 2025-01-30 PROCEDURE — 25000003 PHARM REV CODE 250: Performed by: INTERNAL MEDICINE

## 2025-01-30 PROCEDURE — 97116 GAIT TRAINING THERAPY: CPT

## 2025-01-30 PROCEDURE — 11000004 HC SNF PRIVATE

## 2025-01-30 PROCEDURE — 85025 COMPLETE CBC W/AUTO DIFF WBC: CPT | Performed by: INTERNAL MEDICINE

## 2025-01-30 PROCEDURE — 97530 THERAPEUTIC ACTIVITIES: CPT

## 2025-01-30 PROCEDURE — 97129 THER IVNTJ 1ST 15 MIN: CPT

## 2025-01-30 PROCEDURE — 97130 THER IVNTJ EA ADDL 15 MIN: CPT

## 2025-01-30 PROCEDURE — 80053 COMPREHEN METABOLIC PANEL: CPT | Performed by: INTERNAL MEDICINE

## 2025-01-30 PROCEDURE — 97535 SELF CARE MNGMENT TRAINING: CPT

## 2025-01-30 PROCEDURE — 36415 COLL VENOUS BLD VENIPUNCTURE: CPT | Performed by: INTERNAL MEDICINE

## 2025-01-30 RX ORDER — INSULIN GLARGINE 100 [IU]/ML
22 INJECTION, SOLUTION SUBCUTANEOUS NIGHTLY
Status: DISCONTINUED | OUTPATIENT
Start: 2025-01-30 | End: 2025-01-31

## 2025-01-30 RX ORDER — DOCUSATE SODIUM 100 MG/1
100 CAPSULE, LIQUID FILLED ORAL DAILY
Status: DISCONTINUED | OUTPATIENT
Start: 2025-01-30 | End: 2025-02-01

## 2025-01-30 RX ADMIN — MICONAZOLE NITRATE: 20 POWDER TOPICAL at 08:01

## 2025-01-30 RX ADMIN — MICONAZOLE NITRATE: 20 POWDER TOPICAL at 09:01

## 2025-01-30 RX ADMIN — ALUMINUM HYDROXIDE, MAGNESIUM HYDROXIDE, AND DIMETHICONE 30 ML: 200; 20; 200 SUSPENSION ORAL at 11:01

## 2025-01-30 RX ADMIN — ALUMINUM HYDROXIDE, MAGNESIUM HYDROXIDE, AND DIMETHICONE 30 ML: 200; 20; 200 SUSPENSION ORAL at 09:01

## 2025-01-30 RX ADMIN — INSULIN ASPART 5 UNITS: 100 INJECTION, SOLUTION INTRAVENOUS; SUBCUTANEOUS at 07:01

## 2025-01-30 RX ADMIN — ALUMINUM HYDROXIDE, MAGNESIUM HYDROXIDE, AND DIMETHICONE 30 ML: 200; 20; 200 SUSPENSION ORAL at 03:01

## 2025-01-30 RX ADMIN — LISINOPRIL 5 MG: 5 TABLET ORAL at 08:01

## 2025-01-30 RX ADMIN — DOCUSATE SODIUM 100 MG: 100 CAPSULE, LIQUID FILLED ORAL at 02:01

## 2025-01-30 RX ADMIN — ATORVASTATIN CALCIUM 20 MG: 10 TABLET, FILM COATED ORAL at 08:01

## 2025-01-30 RX ADMIN — ENOXAPARIN SODIUM 40 MG: 40 INJECTION SUBCUTANEOUS at 05:01

## 2025-01-30 RX ADMIN — MEMANTINE 10 MG: 5 TABLET ORAL at 09:01

## 2025-01-30 RX ADMIN — MEMANTINE 10 MG: 5 TABLET ORAL at 08:01

## 2025-01-30 RX ADMIN — INSULIN ASPART 5 UNITS: 100 INJECTION, SOLUTION INTRAVENOUS; SUBCUTANEOUS at 11:01

## 2025-01-30 RX ADMIN — HYDROCODONE BITARTRATE AND ACETAMINOPHEN 1 TABLET: 5; 325 TABLET ORAL at 01:01

## 2025-01-30 RX ADMIN — ALUMINUM HYDROXIDE, MAGNESIUM HYDROXIDE, AND DIMETHICONE 30 ML: 200; 20; 200 SUSPENSION ORAL at 05:01

## 2025-01-30 RX ADMIN — INSULIN ASPART 5 UNITS: 100 INJECTION, SOLUTION INTRAVENOUS; SUBCUTANEOUS at 03:01

## 2025-01-30 RX ADMIN — HYDROCODONE BITARTRATE AND ACETAMINOPHEN 1 TABLET: 5; 325 TABLET ORAL at 12:01

## 2025-01-30 RX ADMIN — LEVOTHYROXINE SODIUM 100 MCG: 0.1 TABLET ORAL at 05:01

## 2025-01-30 RX ADMIN — INSULIN GLARGINE 22 UNITS: 100 INJECTION, SOLUTION SUBCUTANEOUS at 09:01

## 2025-01-30 NOTE — SUBJECTIVE & OBJECTIVE
Review of Systems   Constitutional:  Positive for activity change and fatigue. Negative for appetite change and fever.   Respiratory:  Negative for chest tightness and shortness of breath.    Cardiovascular:  Negative for chest pain and leg swelling.   Gastrointestinal:  Negative for abdominal distention, abdominal pain, constipation, diarrhea, nausea and vomiting.   Musculoskeletal:  Positive for arthralgias and gait problem.   Skin:  Negative for rash and wound.     Objective:     Vital Signs (Most Recent):  Temp: 97.6 °F (36.4 °C) (01/30/25 0837)  Pulse: 108 (01/30/25 0936)  Resp: 18 (01/30/25 1210)  BP: 116/75 (01/30/25 0837)  SpO2: 97 % (01/30/25 0837) Vital Signs (24h Range):  Temp:  [97.6 °F (36.4 °C)-98.6 °F (37 °C)] 97.6 °F (36.4 °C)  Pulse:  [107-114] 108  Resp:  [18] 18  SpO2:  [95 %-97 %] 97 %  BP: (109-116)/(56-75) 116/75     Weight: 69 kg (152 lb 1.9 oz)  Body mass index is 25.31 kg/m².    Intake/Output Summary (Last 24 hours) at 1/30/2025 1258  Last data filed at 1/29/2025 1700  Gross per 24 hour   Intake 360 ml   Output --   Net 360 ml         Physical Exam  Vitals reviewed. Exam conducted with a chaperone present.   Constitutional:       General: She is not in acute distress.     Appearance: Normal appearance. She is not ill-appearing.   HENT:      Head: Normocephalic and atraumatic.      Nose: Nose normal.   Eyes:      Conjunctiva/sclera: Conjunctivae normal.   Cardiovascular:      Rate and Rhythm: Normal rate and regular rhythm.      Pulses: Normal pulses.      Heart sounds: No murmur heard.  Pulmonary:      Effort: Pulmonary effort is normal.      Breath sounds: Normal breath sounds. No wheezing, rhonchi or rales.   Abdominal:      General: Bowel sounds are normal. There is no distension.      Palpations: Abdomen is soft.      Tenderness: There is no abdominal tenderness. There is no guarding.   Musculoskeletal:         General: No swelling.      Cervical back: Normal range of motion and neck  supple.      Right lower leg: No edema.      Left lower leg: No edema.      Comments: Right arm in a sling. Decreased rom and pain.   Skin:     General: Skin is warm and dry.      Findings: No rash.   Neurological:      Mental Status: She is alert and oriented to person, place, and time. Mental status is at baseline.      Gait: Gait abnormal.   Psychiatric:         Mood and Affect: Mood normal.         Thought Content: Thought content normal.         Judgment: Judgment normal.             Significant Labs: All pertinent labs within the past 24 hours have been reviewed.  Recent Lab Results         01/30/25  0546   01/30/25  0507   01/29/25 2018 01/29/25  1639        Albumin/Globulin Ratio   0.8           Albumin   3.0           ALP   74           ALT   13           Anion Gap   6.0           AST   15           Baso #   0.07           Basophil %   0.8           BILIRUBIN TOTAL   0.6           BUN   27.0           BUN/CREAT RATIO   36           Calcium   9.7           Chloride   101           CO2   30           Creatinine   0.74           eGFR   >60  Comment: Estimated GFR calculated using the CKD-EPI creatinine (2021) equation.           Eos #   0.19           Eos %   2.2           Globulin, Total   3.6           Glucose   233           Hematocrit   27.7           Hemoglobin   8.4           Immature Grans (Abs)   0.05           Immature Granulocytes   0.6           Lymph #   2.58           LYMPH %   29.8           MCH   24.4           MCHC   30.3           MCV   80.5           Mono #   0.81           Mono %   9.4           MPV   9.4           Neut #   4.95           Neut %   57.2           nRBC   0.0           Platelet Count   304           POCT Glucose 230     316   177       Potassium   4.3           PROTEIN TOTAL   6.6           RBC   3.44           RDW   18.4           Sodium   137           WBC   8.65                   Significant Imaging: I have reviewed all pertinent imaging results/findings within the past  24 hours.

## 2025-01-30 NOTE — PT/OT/SLP PROGRESS
Physical Therapy Treatment Note           Name: Brittany Nunn    : 1935 (89 y.o.)  MRN: [id number]             Subjective Assessment:     No complaints  Lethargic   X Awake, alert, cooperative  Uncooperative    Agitated X c/o pain    Appropriate X c/o fatigue   X Confused  Treated at bedside     Emotionally labile  Treated in gym/dept.    Impulsive  Other:    Flat affect       Pain/Comfort:    Location - Side 1: Right  Location 1: shoulder    Therapy Precautions:     X Cognitive deficits  Spinal precautions    Collar - hard  Sternal precautions    Collar - soft   TLSO   X Fall risk  LSO    Hip precautions - posterior  Knee immobilizer    Hip precautions - anterior  WBAT    Impaired communication  Partial weightbearing    Oxygen  TTWB    PEG tube X NWB RUE in sling    Visual deficits  Other:    Hearing deficits               Mobility Training:     Assist Level Assistive Device Comments    Bed Mobility      Sit to stand/Stand to sit SBA-CGA SBQC Sit to stand/stand to sit performed from recliner level and from toilet.    Transfers SBA-CGA  Functional mobility performed from the recliner to the restroom. Pt performed a +void in her brief prior to reaching the toilet.  While seated on the toilet, pt performed a very small BM.  Pt performed independent hygiene, but did require assistance to thread a clean brief around her feet.     Gait SBA-CGA SBQC Pt able to ambulate 150 ft with a step through gait pattern and very slow gait speed. Pt demonstrated good sequencing with the SBQC.  No significant LOB noted.  Chair was kept in tow for safety.  While ambulating, pt participated in conversation with SLP.     Balance Training      Wheelchair Mobility      Stair Climbing      Car Transfer      Other:             Assessment:     Patient tolerated session fairly well and was able to ambulate without significant complaints.  Co-treatment was performed with SLP assessing safety and sequencing, while PT assessed  mobility.  PT continues to recommend 24 hour care upon D/C from this facility.        GOALS:   Multidisciplinary Problems       Physical Therapy Goals          Problem: Physical Therapy    Goal Priority Disciplines Outcome Interventions   Physical Therapy Goal     PT, PT/OT Progressing    Description: Patient will increase functional independence with mobility by performin. Supine to sit with Stand-by Assistance  2. Sit to supine with Stand-by Assistance  3. Sit to stand transfer with Stand-by Assistance  4. Gait  x 75 feet with Stand-by Assistance using No Assistive Device vs SQBC.                            Discharge Recommendations:      24 hour care    Discharge Equipment Recommendations:      (TBD)     At end of treatment, patient remained:     X Up in chair     In bed   X With alarm activated    Bed rails up   X Call bell in reach      Family/friends present     Restraints secured properly     In bathroom with CNA/RN notified     In gym with PT/PTA/tech     Nurse aware     Other:           PT Start Time: 1034     PT Stop Time: 1101  PT Total Time (min): 27 min     Billable Minutes: Gait Training 17 and Therapeutic Activity 10      2025

## 2025-01-30 NOTE — PLAN OF CARE
01/30/25 1516   Discharge Reassessment   Assessment Type Discharge Planning Reassessment   Did the patient's condition or plan change since previous assessment? No   Discharge Plan discussed with: Adult children;Patient   Communicated ISATU with patient/caregiver Date not available/Unable to determine   Discharge Plan A Home with family;Home Health   Discharge Plan B Home Health;Home with family   DME Needed Upon Discharge  none   Transition of Care Barriers No family/friends to help;Mobility   Why the patient remains in the hospital Requires continued medical care   Post-Acute Status   Discharge Delays None known at this time

## 2025-01-30 NOTE — PROGRESS NOTES
[facility]  [facility] Medicine  Progress Note    Patient Name: Brittany Nunn  MRN: [id number]  Patient Class: IP- Swing   Admission Date: 1/27/2025  Length of Stay: 3 days  Attending Physician: Radha Rapp DO  Primary Care Provider: Ester, Primary Doctor        Subjective     Principal Problem:Physical deconditioning        HPI:  89 yo female admitted to swing bed today for continued therapy.  She is s/p a fall at home in which she tripped and fell on her right shoulder.  No LOC. She was sent to [facility] where she was found to have an impacted humeral head/neck fracture with some mild subluxation.  This was determined to be non-surgical.  She does c/o pain to her shoulder otherwise she stats she is calm.  Currently no N/V/D/C.  No fever/chills.  NO chest pian/SOB.  She will be admitted for continued PT/OT services and pain control.    Overview/Hospital Course:  1/28/25: Pt was in the process of changing rooms when I saw her this morning. She was nauseous due to her right arm pain. She had vomited a small amount of bile. She denied any cp or sob. I spoke to her daughter this afternoon. She lives in [place]. There is a brother that has passed away, and another brother that lives in [place]. She states that they have been noticing a decline of their parents over the last year and are preparing for the fact that their living situation likely needs to change. She is available by phone if we have any questions. She states that the pt was using a walker, possibly daily, for ambulation. They were eating microwavable foods, mostly. For groceries they do grocery . Also states that since her TIA, the pt has had some urinary incontinence, requiring the use of some type of padding/diaper.    1/29/25: PT sitting up in her recliner during multidisciplinary rounds meeting. Daughter was on the phone. OT/ST is recommending 24 hour care for safety and ADLs. Daughter understands and will start  "working on this. Pt denies sob, cp, abd pain, constipation. Last bm yesterday. Normally has a bm oqd. She is only complaining of right arm pain.     1/30/25: Pt sitting up in her recliner. She asked me to open her diet coke. She said she cannot use her right hand at all to help her open it. Also said that her left hand isn't strong enough to pop open the tab. I pointed out that even these simple tasks are difficult for her now. She spoke to her daughter on the phone earlier this morning. I asked if they had started discussing a plan for care once she is discharged. She said she plans to stay here until her insurance "runs out." I asked what the plan was when she goes home. She said that they were looking at finding a family member that could come in for breakfast and then again around lunch. I reminded her that we are recommending 24 hour care. She said they did not discuss that. I recommended they do so. Reminded her of the options her daughter had mentioned yesterday of either a NH or for the pt to go and live with on the children. Pt said that "maybe someone could drive me to [place] to stay with my daughter-in-law, but she has 2 teenagers." I encouraged her to discuss this further with her daughter. No complaints at this time.    Review of Systems   Constitutional:  Positive for activity change and fatigue. Negative for appetite change and fever.   Respiratory:  Negative for chest tightness and shortness of breath.    Cardiovascular:  Negative for chest pain and leg swelling.   Gastrointestinal:  Negative for abdominal distention, abdominal pain, constipation, diarrhea, nausea and vomiting.   Musculoskeletal:  Positive for arthralgias and gait problem.   Skin:  Negative for rash and wound.     Objective:     Vital Signs (Most Recent):  Temp: 97.6 °F (36.4 °C) (01/30/25 0837)  Pulse: 108 (01/30/25 0936)  Resp: 18 (01/30/25 1210)  BP: 116/75 (01/30/25 0837)  SpO2: 97 % (01/30/25 0837) Vital Signs (24h " Range):  Temp:  [97.6 °F (36.4 °C)-98.6 °F (37 °C)] 97.6 °F (36.4 °C)  Pulse:  [107-114] 108  Resp:  [18] 18  SpO2:  [95 %-97 %] 97 %  BP: (109-116)/(56-75) 116/75     Weight: 69 kg (152 lb 1.9 oz)  Body mass index is 25.31 kg/m².    Intake/Output Summary (Last 24 hours) at 1/30/2025 1258  Last data filed at 1/29/2025 1700  Gross per 24 hour   Intake 360 ml   Output --   Net 360 ml         Physical Exam  Vitals reviewed. Exam conducted with a chaperone present.   Constitutional:       General: She is not in acute distress.     Appearance: Normal appearance. She is not ill-appearing.   HENT:      Head: Normocephalic and atraumatic.      Nose: Nose normal.   Eyes:      Conjunctiva/sclera: Conjunctivae normal.   Cardiovascular:      Rate and Rhythm: Normal rate and regular rhythm.      Pulses: Normal pulses.      Heart sounds: No murmur heard.  Pulmonary:      Effort: Pulmonary effort is normal.      Breath sounds: Normal breath sounds. No wheezing, rhonchi or rales.   Abdominal:      General: Bowel sounds are normal. There is no distension.      Palpations: Abdomen is soft.      Tenderness: There is no abdominal tenderness. There is no guarding.   Musculoskeletal:         General: No swelling.      Cervical back: Normal range of motion and neck supple.      Right lower leg: No edema.      Left lower leg: No edema.      Comments: Right arm in a sling. Decreased rom and pain.   Skin:     General: Skin is warm and dry.      Findings: No rash.   Neurological:      Mental Status: She is alert and oriented to person, place, and time. Mental status is at baseline.      Gait: Gait abnormal.   Psychiatric:         Mood and Affect: Mood normal.         Thought Content: Thought content normal.         Judgment: Judgment normal.             Significant Labs: All pertinent labs within the past 24 hours have been reviewed.  Recent Lab Results         01/30/25  0546   01/30/25  0507   01/29/25 2018 01/29/25  5770         Albumin/Globulin Ratio   0.8           Albumin   3.0           ALP   74           ALT   13           Anion Gap   6.0           AST   15           Baso #   0.07           Basophil %   0.8           BILIRUBIN TOTAL   0.6           BUN   27.0           BUN/CREAT RATIO   36           Calcium   9.7           Chloride   101           CO2   30           Creatinine   0.74           eGFR   >60  Comment: Estimated GFR calculated using the CKD-EPI creatinine (2021) equation.           Eos #   0.19           Eos %   2.2           Globulin, Total   3.6           Glucose   233           Hematocrit   27.7           Hemoglobin   8.4           Immature Grans (Abs)   0.05           Immature Granulocytes   0.6           Lymph #   2.58           LYMPH %   29.8           MCH   24.4           MCHC   30.3           MCV   80.5           Mono #   0.81           Mono %   9.4           MPV   9.4           Neut #   4.95           Neut %   57.2           nRBC   0.0           Platelet Count   304           POCT Glucose 230     316   177       Potassium   4.3           PROTEIN TOTAL   6.6           RBC   3.44           RDW   18.4           Sodium   137           WBC   8.65                   Significant Imaging: I have reviewed all pertinent imaging results/findings within the past 24 hours.    Assessment and Plan     * Physical deconditioning  Admit to swing bed  OOB  PT/OT  Resume transfer meds  DVT prophylaxis  Follow labs      Type 2 diabetes mellitus, with long-term current use of insulin  Resume lantus and aspart 5 units tid.  1/29/25: Increase lantus to 20units. CBGs >400. Continue to monitor.  1/30/25: Increase lantus to 22units.  this morning. Continue to monitor.    Closed fracture of proximal end of right humerus  PT/OT  Recs as per ortho      Moderate malnutrition  Nutrition consulted. Most recent weight and BMI monitored-     Measurements:  Wt Readings from Last 1 Encounters:   01/28/25 69 kg (152 lb 1.9 oz)   Body mass index  is 25.31 kg/m².    Patient has been screened and assessed by RD.    Malnutrition Type:  Context: acute illness or injury  Level: moderate    Malnutrition Characteristic Summary:  Weight Loss (Malnutrition): other (see comments) (Does not meet criteria)  Energy Intake (Malnutrition): less than or equal to 50% for greater than or equal to 5 days  Subcutaneous Fat (Malnutrition): mild depletion  Muscle Mass (Malnutrition): mild depletion  Fluid Accumulation (Malnutrition): other (see comments) (Not present)  Hand  Strength, Right (Malnutrition): Measurably reduced for age/gender    Interventions/Recommendations (treatment strategy):  Oral nutritional supplement;Care coordination or referral;Feeding assistance/management;Oral diet/nutrient modifications        VTE Risk Mitigation (From admission, onward)           Ordered     enoxaparin injection 40 mg  Daily         01/27/25 223                    Discharge Planning   ISATU:      Code Status: Full Code   Medical Readiness for Discharge Date:   Discharge Plan A: Home with family, Home Health                Please place Justification for DME        FATMATA ARANDA DO  Department of Hospital Medicine   [facility] - Medical Surgical Unit

## 2025-01-30 NOTE — PLAN OF CARE
Weekly Staffing Report      Date Admitted: 1/27/2025 :   Staffing Date: 1/30/2025     Patient Active Problem List   Diagnosis    Orthostatic hypotension    Moderate malnutrition    Physical deconditioning    Closed fracture of proximal end of right humerus    Type 2 diabetes mellitus, with long-term current use of insulin          Team Members Present:       Nursing Present     Yes [x]    No []    Physical Therapy Present    Yes [x]    No []    Occupational Therapy Present     Yes [x]    No []    Speech Therapy Present    Yes [x]    No []    NA []    Dietary Present    Yes [x]    No []        Physician Present   [] Dr. Noble    [x] Dr. Rapp       Family Present    [x] Adult Children via phone    [] Spouse    [] POA    [] Friend/ Caregiver    [] Other       Interdisciplinary Meeting Notes:  Spoke with patient in room daughter via phone. Discussed plan once patient able to discharge. Will need 24 hour care. Daughter states understanding and will speak with parents and siblings. Will continue with current POC. All questions answered.          Please see Documented PT/OT/ST, Dietary, Nursing, and Physician notes for detailed treatment information.

## 2025-01-30 NOTE — PROGRESS NOTES
Inpatient Nutrition Assessment    Admit Date: 1/27/2025   Total duration of encounter: 3 days   Patient Age: 89 y.o.    Nutrition Recommendation/Prescription     2000 kcal Diabetic Diet. Add Easy to Chew (IDDSI Level 7) modifier.  Encourage intake and honor preferences as able. Assist w/ feedings as needed.  Boost Glucose Control TID (provides 190 kcal and 16 g protein per serving).  Rec'd consider daily MVI.  If po intake remains inadequate, consider appetite stimulant as medically feasible.  Medical mgmt of hyperglycemia.  Will continue to monitor wt, labs, and po intake.    Communication of Recommendations: reviewed with patient    Nutrition Assessment     Malnutrition Assessment/Nutrition-Focused Physical Exam    Malnutrition Context: acute illness or injury (01/29/25 1039)  Malnutrition Level: moderate (01/29/25 1039)  Energy Intake (Malnutrition): less than or equal to 50% for greater than or equal to 5 days (01/29/25 1039)  Weight Loss (Malnutrition): other (see comments) (Does not meet criteria) (01/29/25 1039)  Subcutaneous Fat (Malnutrition): mild depletion (01/29/25 1039)  Orbital Region (Subcutaneous Fat Loss): mild depletion  Upper Arm Region (Subcutaneous Fat Loss): mild depletion     Muscle Mass (Malnutrition): mild depletion (01/29/25 1039)  Scientologist Region (Muscle Loss): mild depletion           Dorsal Hand (Muscle Loss): mild depletion           Fluid Accumulation (Malnutrition): other (see comments) (Not present) (01/29/25 1039)     Hand  Strength, Right (Malnutrition): Measurably reduced for age/gender (01/29/25 1039)  A minimum of two characteristics is recommended for diagnosis of either severe or non-severe malnutrition.    Chart Review    Reason Seen: [id number]    Malnutrition Screening Tool Results   Have you recently lost weight without trying?: No  Have you been eating poorly because of a decreased appetite?: No   MST Score: 0   Diagnosis:   Physical deconditioning 1/27/2025       Moderate malnutrition 1/27/2025      Closed fracture of proximal end of right humerus 1/27/2025      Type 2 diabetes mellitus, with long-term current use of insulin 1/27/2025           Relevant Medical History: No past medical history on file.     Scheduled Medications:  aluminum-magnesium hydroxide-simethicone, 30 mL, QID (AC & HS)  atorvastatin, 20 mg, Daily  docusate sodium, 100 mg, Daily  enoxparin, 40 mg, Daily  insulin aspart U-100, 5 Units, TIDWM  insulin glargine U-100, 22 Units, QHS  levothyroxine, 100 mcg, Before breakfast  lisinopriL, 5 mg, Daily  memantine, 10 mg, BID  miconazole NITRATE 2 %, , BID    Continuous Infusions:   PRN Medications:  acetaminophen, 650 mg, Q6H PRN  dextrose 50%, 12.5 g, PRN  dextrose 50%, 25 g, PRN  glucagon (human recombinant), 1 mg, PRN  glucose, 16 g, PRN  glucose, 24 g, PRN  HYDROcodone-acetaminophen, 1 tablet, Q6H PRN  melatonin, 6 mg, Nightly PRN  naloxone, 0.02 mg, PRN  ondansetron, 4 mg, Q8H PRN  sodium chloride 0.9%, 10 mL, Q12H PRN    Calorie Containing IV Medications: no significant kcals from medications at this time    Recent Labs   Lab 01/24/25  0351 01/25/25  0352 01/26/25  0408 01/27/25  0503 01/28/25  0411 01/29/25  0448 01/30/25  0507    138 134* 135* 137 139 137   K 4.2 4.2 4.5 4.2 4.4 4.3 4.3   CALCIUM 9.5 9.6 9.3 9.6 9.9 9.6 9.7    103 101 100 101 100 101   CO2 26 26 27 29 28 29 30   BUN 21.1* 24.3* 23.5* 22.9* 32.0* 28.0* 27.0*   CREATININE 0.79 0.75 0.98 0.79 0.82 0.79 0.74   EGFRNORACEVR >60 >60 56 >60 >60 >60 >60   GLUCOSE 170* 230* 289* 239* 270* 267* 233*   BILITOT 0.4 0.6 0.6 0.7 0.5 0.5 0.6   ALKPHOS 62 64 65 69 69 75 74   ALT 10 10 11 14 14 14 13   AST 17 16 17 17 16 17 15   ALBUMIN 3.2* 3.3* 3.1* 3.1* 3.0* 3.0* 3.0*   WBC 8.90 9.87 12.17* 9.86 9.29 7.77 8.65   HGB 8.7* 8.8* 8.5* 9.2* 8.7* 8.8* 8.4*   HCT 28.0* 28.7* 28.2* 29.8* 28.5* 28.8* 27.7*     Nutrition Orders:  Diet diabetic Easy to Chew (IDDSI Level 7); 2000 Calories (up to 75  "gm per meal); Standard Tray  Dietary nutrition supplements TID; Boost Glucose Control - Any flavor    Appetite/Oral Intake: poor/25-50% of meals  Factors Affecting Nutritional Intake: decreased appetite  Social Needs Impacting Access to Food: none identified  Food/Mosque/Cultural Preferences: unable to obtain  Food Allergies: no known food allergies  Last Bowel Movement: 01/28/25  Wound(s):     Wound 01/27/25 2100 Other (comment) Left lower Arm-Tissue loss description: Not applicable none    Comments    1/28/25: Pt w/ decreased appetite and intake x 1 week. No n/v/d/c at this time. Pt was only eating light foods/soups at [facility] and nursing was assisting pt in chopping food into smaller pieces. Will add easy to chew modifier so meats are more tender. Last BM 2 days ago. No wt loss noted per EMR. Labs and meds reviewed, Glu remains elevated.    1/30/25: Pt reports decreased appetite and intake. States she didn't think she could have rice and carbs and she was surprised to see them on her plate. Says it's a lot of food. Reports some nausea yesterday. Pt has been drinking the Boost and liking them. Last BM 2 days ago.    Anthropometrics    Height: 5' 5" (165.1 cm), Height Method: Stated  Last Weight: 69 kg (152 lb 1.9 oz) (01/28/25 0500), Weight Method: Bed Scale  BMI (Calculated): 25.3  BMI Classification: overweight (BMI 25-29.9)     Ideal Body Weight (IBW), Female: 125 lb     % Ideal Body Weight, Female (lb): 118.87 %                             Usual Weight Provided By: EMR weight history    Wt Readings from Last 5 Encounters:   01/28/25 69 kg (152 lb 1.9 oz)   01/27/25 67.5 kg (148 lb 13 oz)   09/30/19 70.5 kg (155 lb 6.8 oz)     Weight Change(s) Since Admission: wt gain noted  Wt Readings from Last 1 Encounters:   01/28/25 0500 69 kg (152 lb 1.9 oz)   01/27/25 2100 67.4 kg (148 lb 9.4 oz)   Admit Weight: 67.4 kg (148 lb 9.4 oz) (01/27/25 2100), Weight Method: Bed Scale    Estimated Needs    Weight Used For Calorie " Calculations: 69 kg (152 lb 1.9 oz)  Energy Calorie Requirements (kcal): 6284-2128 kcal (25-30 kcal/kg)  Energy Need Method: Kcal/kg  Weight Used For Protein Calculations: 69 kg (152 lb 1.9 oz)  Protein Requirements: 69-83 g (1.0-1.2 g/kg)  Fluid Requirements (mL): 2070 mL/d (30 mL/kg)  CHO Requirement: 228 g/d (45% of EER)     Enteral Nutrition     Patient not receiving enteral nutrition at this time.    Parenteral Nutrition     Patient not receiving parenteral nutrition support at this time.    Evaluation of Received Nutrient Intake    Calories: not meeting estimated needs  Protein: not meeting estimated needs    Patient Education     Not applicable.    Nutrition Diagnosis     PES: Inadequate oral intake related to acute illness as evidenced by <50% energy intake x >5 days. (active)     PES: Moderate acute disease or injury related malnutrition Related to acute illness/injury As Evidenced by:  - energy intake: <= 50% for 5 days (meets criteria for <= 50% >= 5 days (severe - acute)) - muscle mass depletion: 2 areas of mild muscle loss (Temporalis, Interosseous) - loss of subcutaneous fat: 2 areas of mild fat loss (Triceps Skinfold, Infraorbital) -  strength: Measurably reduced for age/gender active    Nutrition Interventions     Intervention(s): general/healthful diet, modified composition of meals/snacks, commercial beverage, multivitamin/mineral supplement therapy, prescription medication, and collaboration with other providers  Intervention(s): Oral nutritional supplement;Care coordination or referral;Feeding assistance/management;Oral diet/nutrient modifications    Goal: Meet greater than 80% of nutritional needs by follow-up. (goal progressing)  Goal: Maintain weight throughout hospitalization. (goal progressing)    Nutrition Goals & Monitoring     Dietitian will monitor: food and beverage intake, weight, electrolyte/renal panel, glucose/endocrine profile, and gastrointestinal profile  Discharge planning:  resume home regimen  Nutrition Risk/Follow-Up: moderate (follow-up in 3-5 days)   Please consult if re-assessment needed sooner.

## 2025-01-30 NOTE — ASSESSMENT & PLAN NOTE
Resume lantus and aspart 5 units tid.  1/29/25: Increase lantus to 20units. CBGs >400. Continue to monitor.  1/30/25: Increase lantus to 22units.  this morning. Continue to monitor.

## 2025-01-30 NOTE — PT/OT/SLP PROGRESS
Occupational Therapy  Treatment    Name: Brittany Nunn    : 1935 (89 y.o.)  MRN: [id number]          TREATMENT SUMMARY AND RECOMMENDATIONS:      Subjective Assessment:   No complaints  Lethargic    Awake, alert, cooperative  Uncooperative    Agitated  Flat affect    Appropriate  c/o fatigue    Confused x Treated at bedside     Emotionally liable  Treated in gym/dept.    Impulsive x Other: Pt did not report right shoulder pain at rest prior to activity.       Pain/Comfort:  Pain Rating 1: 0/10      Therapy Precautions:    Cognitive deficits  Spinal precautions    Collar - hard  Sternal precautions    Collar - soft   TLSO   x Fall risk  LSO    Hip precautions - posterior  Knee immobilizer    Hip precautions - anterior  WBAT    Impaired communication  Partial weightbearing    Oxygen  TTWB    PEG tube RUE NWB    Visual deficits x Other: RUE sling.    Hearing deficits           Vitals Value   x Room air      Oxygen (L)     Blood pressure     Heart rate         Treatment Objectives:     Mobility Training:    Mobility task Assist level Comments    Bed mobility SBA Transfer training semi-supine to sit SBA and 1 verbal cue using bed rail from left side of bed.   Balance training     Transfer Min Transfer training bed>BSC>recliner HHA and other hand on gait bet.  Pt required verbal cues to sequence.   Functional mobility     Sit to stand transitions Min Transfer training sit to stand 2x's HHA and other hand on gait belt.    Other:       ADL Training:    ADL Assist level Comments    Feeding     Grooming/hygiene Min ADL grooming activity performed sitting in recliner.  After setup, pt able to wash face and perform oral care with LUE.  Pt able to groom 75% of hair.  Hair wet with damp towel and assist required to brush out tangles.   Bathing     Upper body dressing     Lower body dressing     Toileting Mod  ADL toileting performed.  Pt required assistance to lowering/raising pull-up, pt able to perform hygiene with  setup after +urine.   Toilet transfer Min    Adaptive equipment training     Other:         Additional Treatment:  While supine in bed performed therapeutic massage with lotion to cervical and scapular region with trigger point releasing.  Performed left elbow elbow AAROM flex/ext 1x10 reps to tolerance.  Performed AROM forearm sup/pron, wrist flex/ext/circles, and hand pumps 10 reps with verbal and tactile cues for proper technique.  Issued pt soft resistance foam block and educated on hand pumps to be performed daily.  Reinforced call bell function and call before you fall.    Assessment: Patient tolerated session fair.  Noted increase in bed mobility and pt able to perform functional transfers with HHA.  Pt conts. to require assist with toileting activity.  Cont to recommend 24-hour assistance.  Pt will benefit from cont PLC to address deficits with goal of achieving max functional abilities.      OT Plan: OT to focus next session on ADL training with shower activity.      GOALS:   Multidisciplinary Problems       Occupational Therapy Goals          Problem: Occupational Therapy    Goal Priority Disciplines Outcome Interventions   Occupational Therapy Goal     OT, PT/OT Progressing    Description:   Patient will increase functional independence with ADLs by performing:    LE Dressing with Minimal Assistance.  Toileting from bedside commode with Contact Guard Assistance for hygiene and clothing management.   Bathing from  shower chair/bench with Minimal Assistance.  Toilet transfer to bedside commode with Contact Guard Assistance.                         Communication with Treatment Team:     Discharge Recommendations:    Discharge Equipment Recommendations:   (TBD)  Barriers to discharge:  Decreased caregiver support      At end of treatment, patient remained:  x Up in chair     In bed   x With alarm activated    Bed rails up   x Call bell in reach     Family/friends present    Restraints secured properly    In  bathroom with CNA/RN notified    In gym with PT/PTA/tech   x Nurse aware    Other:         OT Date of Treatment: 01/30/25  OT Start Time: 0855  OT Stop Time: 0930  OT Total Time (min): 35 min    Billable Minutes:Self Care/Home Management 15  Therapeutic Activity 20      1/30/2025

## 2025-01-30 NOTE — PT/OT/SLP PROGRESS
"Speech Language Pathology Treatment    Patient Name:  Brittany Nunn   MRN:  [id number]  Admitting Diagnosis: Physical deconditioning    Recommendations:                      General Recommendations:  Cognitive-linguistic therapy/ dementia education/training  Diet recommendations:  Easy to Chew Diet - IDDSI Level 7, Thin liquids - IDDSI Level 0   Aspiration Precautions:  n/a    General Precautions: Standard, fall (confusion / dementia)  Communication strategies:   reduce distractions, ensure Pt's hearing aides are in and on, allow pt to see speakers mouth    Assessment:     Brittany Nunn is a 89 y.o. 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  She presents with reduced awareness and insight for adequate problem solving/reasoning, reduced receptive and expressive language skills at mod-complex levels. Improved functional awareness and problem solving today with min cues required for safety strategy use. Good verbalization of needs rather than impulsivity.     Subjective     Pt participated well throughout. Pt was conversed willingly during walking and appeared to enjoy conversation about her family history.     Patient goals: "get home"      Pain/Comfort:   No mild pain reported in hip, Pt given rest breaks as needed    Respiratory Status: Room air    Objective:     Co treatment with PT targeting mobility with cane and SLP targeting dual tasking, self awareness, and recall within more functional situations. Co-treatment necessary for increased Pt safety to target more functional tasks for improved generalization.     Pt conversed while walking with cane with min cues to continue movement while verbalizing/thinking. Some slowed gait noted but Pt was able to self-correct at times. Pt also noted to self correct posture at times.     Pt aware of incontinence when beginning to walk and completed toileting without cues for sequencing or " attention (physical assistance required and provided by PT).     SLP educated Pt on cognitive skills required for toileting and dual tasking.     Has the patient been evaluated by SLP for swallowing?   No  Keep patient NPO? No     Goals:   Multidisciplinary Problems       SLP Goals          Problem: SLP    Goal Priority Disciplines Outcome   SLP Goal     SLP Progressing   Description: LTGs:  Pt will demonstrate improved recall and use of strategies for improved communication, safety, and participation in ADLs.    STGs:  Pt/family will participate in dementia education/training and complete teach-back for improved communication and strategy use to increase safety and participation in ADLs.   Pt will demonstrate recall and use of strategies with min cues for improved communication, safety, and participation in ADLs.                        Plan:   Con't POC.   Patient to be seen:   (3-5x/week)   Plan of Care expires:     Plan of Care reviewed with:  patient, daughter   SLP Follow-Up:  Yes       Discharge recommendations:    home with  care or nursing home  Barriers to Discharge:  Level of Skilled Assistance Needed   and Safety Awareness      Time Tracking:     SLP Treatment Date:   01/30/25  Speech Start Time:  1030  Speech Stop Time:  1055     Speech Total Time (min):  25 min    Billable Minutes: cog 25min / 2 units    Bebe Pappas MA, CCC-SLP  01/30/2025

## 2025-01-31 LAB
ALBUMIN SERPL-MCNC: 3.2 G/DL (ref 3.4–4.8)
ALBUMIN/GLOB SERPL: 0.8 RATIO (ref 1.1–2)
ALP SERPL-CCNC: 82 UNIT/L (ref 40–150)
ALT SERPL-CCNC: 14 UNIT/L (ref 0–55)
ANION GAP SERPL CALC-SCNC: 10 MEQ/L
AST SERPL-CCNC: 18 UNIT/L (ref 5–34)
BASOPHILS # BLD AUTO: 0.06 X10(3)/MCL
BASOPHILS NFR BLD AUTO: 0.7 %
BILIRUB SERPL-MCNC: 0.7 MG/DL
BUN SERPL-MCNC: 26 MG/DL (ref 9.8–20.1)
CALCIUM SERPL-MCNC: 9.8 MG/DL (ref 8.4–10.2)
CHLORIDE SERPL-SCNC: 100 MMOL/L (ref 98–107)
CO2 SERPL-SCNC: 28 MMOL/L (ref 23–31)
CREAT SERPL-MCNC: 0.84 MG/DL (ref 0.55–1.02)
CREAT/UREA NIT SERPL: 31
EOSINOPHIL # BLD AUTO: 0.09 X10(3)/MCL (ref 0–0.9)
EOSINOPHIL NFR BLD AUTO: 1.1 %
ERYTHROCYTE [DISTWIDTH] IN BLOOD BY AUTOMATED COUNT: 18.6 % (ref 11.5–17)
GFR SERPLBLD CREATININE-BSD FMLA CKD-EPI: >60 ML/MIN/1.73/M2
GLOBULIN SER-MCNC: 3.8 GM/DL (ref 2.4–3.5)
GLUCOSE SERPL-MCNC: 273 MG/DL (ref 82–115)
HCT VFR BLD AUTO: 29.5 % (ref 37–47)
HGB BLD-MCNC: 9.1 G/DL (ref 12–16)
IMM GRANULOCYTES # BLD AUTO: 0.07 X10(3)/MCL (ref 0–0.04)
IMM GRANULOCYTES NFR BLD AUTO: 0.8 %
LYMPHOCYTES # BLD AUTO: 1.66 X10(3)/MCL (ref 0.6–4.6)
LYMPHOCYTES NFR BLD AUTO: 19.4 %
MCH RBC QN AUTO: 24.7 PG (ref 27–31)
MCHC RBC AUTO-ENTMCNC: 30.8 G/DL (ref 33–36)
MCV RBC AUTO: 80.2 FL (ref 80–94)
MONOCYTES # BLD AUTO: 0.75 X10(3)/MCL (ref 0.1–1.3)
MONOCYTES NFR BLD AUTO: 8.8 %
NEUTROPHILS # BLD AUTO: 5.92 X10(3)/MCL (ref 2.1–9.2)
NEUTROPHILS NFR BLD AUTO: 69.2 %
NRBC BLD AUTO-RTO: 0 %
PLATELET # BLD AUTO: 336 X10(3)/MCL (ref 130–400)
PMV BLD AUTO: 9.5 FL (ref 7.4–10.4)
POCT GLUCOSE: 139 MG/DL (ref 70–110)
POCT GLUCOSE: 282 MG/DL (ref 70–110)
POCT GLUCOSE: 334 MG/DL (ref 70–110)
POCT GLUCOSE: 393 MG/DL (ref 70–110)
POCT GLUCOSE: 418 MG/DL (ref 70–110)
POCT GLUCOSE: 457 MG/DL (ref 70–110)
POTASSIUM SERPL-SCNC: 4.2 MMOL/L (ref 3.5–5.1)
PROT SERPL-MCNC: 7 GM/DL (ref 5.8–7.6)
RBC # BLD AUTO: 3.68 X10(6)/MCL (ref 4.2–5.4)
SODIUM SERPL-SCNC: 138 MMOL/L (ref 136–145)
WBC # BLD AUTO: 8.55 X10(3)/MCL (ref 4.5–11.5)

## 2025-01-31 PROCEDURE — 36415 COLL VENOUS BLD VENIPUNCTURE: CPT | Performed by: INTERNAL MEDICINE

## 2025-01-31 PROCEDURE — 97130 THER IVNTJ EA ADDL 15 MIN: CPT

## 2025-01-31 PROCEDURE — 97535 SELF CARE MNGMENT TRAINING: CPT

## 2025-01-31 PROCEDURE — 80053 COMPREHEN METABOLIC PANEL: CPT | Performed by: INTERNAL MEDICINE

## 2025-01-31 PROCEDURE — 63600175 PHARM REV CODE 636 W HCPCS: Performed by: INTERNAL MEDICINE

## 2025-01-31 PROCEDURE — 97129 THER IVNTJ 1ST 15 MIN: CPT

## 2025-01-31 PROCEDURE — 11000004 HC SNF PRIVATE

## 2025-01-31 PROCEDURE — 25000003 PHARM REV CODE 250: Performed by: INTERNAL MEDICINE

## 2025-01-31 PROCEDURE — 85025 COMPLETE CBC W/AUTO DIFF WBC: CPT | Performed by: INTERNAL MEDICINE

## 2025-01-31 PROCEDURE — 97116 GAIT TRAINING THERAPY: CPT

## 2025-01-31 PROCEDURE — 97530 THERAPEUTIC ACTIVITIES: CPT

## 2025-01-31 RX ORDER — IBUPROFEN 200 MG
24 TABLET ORAL
Status: DISCONTINUED | OUTPATIENT
Start: 2025-01-31 | End: 2025-02-07 | Stop reason: HOSPADM

## 2025-01-31 RX ORDER — IBUPROFEN 200 MG
16 TABLET ORAL
Status: DISCONTINUED | OUTPATIENT
Start: 2025-01-31 | End: 2025-02-07 | Stop reason: HOSPADM

## 2025-01-31 RX ORDER — INSULIN ASPART 100 [IU]/ML
0-10 INJECTION, SOLUTION INTRAVENOUS; SUBCUTANEOUS
Status: DISCONTINUED | OUTPATIENT
Start: 2025-01-31 | End: 2025-02-07 | Stop reason: HOSPADM

## 2025-01-31 RX ORDER — INSULIN GLARGINE 100 [IU]/ML
24 INJECTION, SOLUTION SUBCUTANEOUS NIGHTLY
Status: DISCONTINUED | OUTPATIENT
Start: 2025-01-31 | End: 2025-01-31

## 2025-01-31 RX ORDER — POLYETHYLENE GLYCOL 3350 17 G/17G
17 POWDER, FOR SOLUTION ORAL DAILY PRN
Status: DISCONTINUED | OUTPATIENT
Start: 2025-01-31 | End: 2025-02-01

## 2025-01-31 RX ORDER — GLIMEPIRIDE 4 MG/1
4 TABLET ORAL
COMMUNITY

## 2025-01-31 RX ORDER — GLIMEPIRIDE 2 MG/1
4 TABLET ORAL
Status: DISCONTINUED | OUTPATIENT
Start: 2025-02-01 | End: 2025-02-07 | Stop reason: HOSPADM

## 2025-01-31 RX ORDER — METFORMIN HYDROCHLORIDE 500 MG/1
500 TABLET ORAL 2 TIMES DAILY WITH MEALS
Status: DISCONTINUED | OUTPATIENT
Start: 2025-01-31 | End: 2025-02-07 | Stop reason: HOSPADM

## 2025-01-31 RX ORDER — GLUCAGON 1 MG
1 KIT INJECTION
Status: DISCONTINUED | OUTPATIENT
Start: 2025-01-31 | End: 2025-02-07 | Stop reason: HOSPADM

## 2025-01-31 RX ORDER — METFORMIN HYDROCHLORIDE 500 MG/1
500 TABLET ORAL 2 TIMES DAILY WITH MEALS
COMMUNITY

## 2025-01-31 RX ORDER — INSULIN GLARGINE 100 [IU]/ML
20 INJECTION, SOLUTION SUBCUTANEOUS NIGHTLY
Status: DISCONTINUED | OUTPATIENT
Start: 2025-01-31 | End: 2025-02-07 | Stop reason: HOSPADM

## 2025-01-31 RX ADMIN — MICONAZOLE NITRATE: 20 POWDER TOPICAL at 08:01

## 2025-01-31 RX ADMIN — ALUMINUM HYDROXIDE, MAGNESIUM HYDROXIDE, AND DIMETHICONE 30 ML: 200; 20; 200 SUSPENSION ORAL at 05:01

## 2025-01-31 RX ADMIN — METFORMIN HYDROCHLORIDE 500 MG: 500 TABLET ORAL at 04:01

## 2025-01-31 RX ADMIN — ENOXAPARIN SODIUM 40 MG: 40 INJECTION SUBCUTANEOUS at 04:01

## 2025-01-31 RX ADMIN — Medication 6 MG: at 08:01

## 2025-01-31 RX ADMIN — INSULIN GLARGINE 20 UNITS: 100 INJECTION, SOLUTION SUBCUTANEOUS at 08:01

## 2025-01-31 RX ADMIN — DOCUSATE SODIUM 100 MG: 100 CAPSULE, LIQUID FILLED ORAL at 08:01

## 2025-01-31 RX ADMIN — INSULIN ASPART 10 UNITS: 100 INJECTION, SOLUTION INTRAVENOUS; SUBCUTANEOUS at 12:01

## 2025-01-31 RX ADMIN — HYDROCODONE BITARTRATE AND ACETAMINOPHEN 1 TABLET: 5; 325 TABLET ORAL at 08:01

## 2025-01-31 RX ADMIN — INSULIN ASPART 5 UNITS: 100 INJECTION, SOLUTION INTRAVENOUS; SUBCUTANEOUS at 10:01

## 2025-01-31 RX ADMIN — POLYETHYLENE GLYCOL 3350 17 G: 17 POWDER, FOR SOLUTION ORAL at 02:01

## 2025-01-31 RX ADMIN — ONDANSETRON 4 MG: 4 TABLET, ORALLY DISINTEGRATING ORAL at 01:01

## 2025-01-31 RX ADMIN — ALUMINUM HYDROXIDE, MAGNESIUM HYDROXIDE, AND DIMETHICONE 30 ML: 200; 20; 200 SUSPENSION ORAL at 04:01

## 2025-01-31 RX ADMIN — ALUMINUM HYDROXIDE, MAGNESIUM HYDROXIDE, AND DIMETHICONE 30 ML: 200; 20; 200 SUSPENSION ORAL at 08:01

## 2025-01-31 RX ADMIN — INSULIN ASPART 5 UNITS: 100 INJECTION, SOLUTION INTRAVENOUS; SUBCUTANEOUS at 07:01

## 2025-01-31 RX ADMIN — MEMANTINE 10 MG: 5 TABLET ORAL at 08:01

## 2025-01-31 RX ADMIN — ALUMINUM HYDROXIDE, MAGNESIUM HYDROXIDE, AND DIMETHICONE 30 ML: 200; 20; 200 SUSPENSION ORAL at 10:01

## 2025-01-31 RX ADMIN — ATORVASTATIN CALCIUM 20 MG: 10 TABLET, FILM COATED ORAL at 08:01

## 2025-01-31 RX ADMIN — LEVOTHYROXINE SODIUM 100 MCG: 0.1 TABLET ORAL at 05:01

## 2025-01-31 RX ADMIN — LISINOPRIL 5 MG: 5 TABLET ORAL at 08:01

## 2025-01-31 NOTE — PT/OT/SLP PROGRESS
Occupational Therapy  Treatment    Name: Brittany Nunn    : 1935 (89 y.o.)  MRN: [id number]          TREATMENT SUMMARY AND RECOMMENDATIONS:      Subjective Assessment:   No complaints  Lethargic   x Awake, alert, cooperative  Uncooperative    Agitated x Flat affect    Appropriate  c/o fatigue   x Confused  Treated at bedside     Emotionally liable  Treated in gym/dept.    Impulsive x Other: Pt reported feeling much better after shower activity.       Pain/Comfort:  Pain Rating 1: 4/10  Location - Side 1: Right  Location 1: shoulder  Pain Addressed 1: Reposition, Distraction, Other (see comments) (Therapeutic massage with trigger point releasing)      Therapy Precautions:   x Cognitive deficits  Spinal precautions    Collar - hard  Sternal precautions    Collar - soft   TLSO   x Fall risk  LSO    Hip precautions - posterior  Knee immobilizer    Hip precautions - anterior  WBAT    Impaired communication  Partial weightbearing    Oxygen  TTWB    PEG tube RUE NWB    Visual deficits x Other:Sling   x Hearing deficits           Vitals Value   x Room air      Oxygen (L)     Blood pressure     Heart rate         Treatment Objectives:     Mobility Training:    Mobility task Assist level Comments    Bed mobility Min Transfer training semi-supine to sit min assist with upper trunk today secondary to lower back stiffness.     Balance training CGA Dynamic standing bal/moy during ADL activities CGA holding onto grab bar up to 1 min 3 reps.    Transfer CGA Transfer training CGA using SBQC bed>wc>in/out walk-in shower to TTB>WC>recliner.  Pt required verbal cuing to sequence and proper hand placement.   Functional mobility CGA Functional mobility into/out of shower room 8 ft CGA with SBQC.  Pt required verbal cuing for proper cane placement/proximity.   Sit to stand transitions Min Transfer training  sit to stand from multiple surfaces.  Pt able to transfer with CGA from bed, wc, min assist from TTB using grab bar.    Other:       ADL Training:    ADL Assist level Comments    Feeding     Grooming/hygiene     Bathing Max  ADL bathing training performed with shower activity sitting on TTB using HH shower. After setup, pt able to wet body, bathe/dry trunk, face, and bilateral thighs, and perineal area.  Assist required with remaining.     Upper body dressing Max Pt required assist to doff/don hospital gown and RUE sling.   Lower body dressing Max Pt required assistance to doff/don pull-up and bilateral slipper socks.   Toileting     Toilet transfer     Adaptive equipment training     Other:         Additional Treatment:  Began session with therapeutic massage with trigger point releasing to right upper traps and scapular region.  Performed right elbow AAROM flex/ext 1x10 reps.  Reinforced call bell function and call before you fall.    Assessment: Patient tolerated session fair.  Noted performing functional transfers with CGA using SBQC and verbal cues for sequencing/safety.  Pt continues to require max assist with bathing and dressing activities.  Cont to recommend 24-hour care.  Pt will benefit from cont PLC to address deficits with goal of achieving max functional abilities.     OT Plan: OT to focus next session on functional transfers, ADL training, and education on HEP.      GOALS:   Multidisciplinary Problems       Occupational Therapy Goals          Problem: Occupational Therapy    Goal Priority Disciplines Outcome Interventions   Occupational Therapy Goal     OT, PT/OT Progressing    Description:   Patient will increase functional independence with ADLs by performing:    LE Dressing with Minimal Assistance.  Toileting from bedside commode with Contact Guard Assistance for hygiene and clothing management.   Bathing from  shower chair/bench with Minimal Assistance.  Toilet transfer to bedside commode with Contact Guard Assistance.                         Communication with Treatment Team:     Discharge Recommendations:     Discharge Equipment Recommendations:   (TBD)  Barriers to discharge:  Decreased caregiver support      At end of treatment, patient remained:  x Up in chair     In bed   x With alarm activated    Bed rails up   x Call bell in reach     Family/friends present    Restraints secured properly    In bathroom with CNA/RN notified    In gym with PT/PTA/tech   x Nurse aware    Other:         OT Date of Treatment: 01/31/25  OT Start Time: 0905  OT Stop Time: 0955  OT Total Time (min): 50 min    Billable Minutes:Self Care/Home Management 30  Therapeutic Activity 20      1/31/2025

## 2025-01-31 NOTE — PT/OT/SLP PROGRESS
"Speech Language Pathology Treatment    Patient Name:  Brittany Nunn   MRN:  [id number]  Admitting Diagnosis: Physical deconditioning    Recommendations:                      General Recommendations:  Cognitive-linguistic therapy/ dementia education/training  Diet recommendations:  Easy to Chew Diet - IDDSI Level 7, Thin liquids - IDDSI Level 0   Aspiration Precautions:  n/a    General Precautions: Standard, fall (confusion / dementia)  Communication strategies:   reduce distractions, ensure Pt's hearing aides are in and on, allow pt to see speakers mouth    Assessment:     Brittany Nunn is a 89 y.o. 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  She presents with reduced awareness and insight for adequate problem solving/reasoning, reduced receptive and expressive language skills at mod-complex levels. Good sequencing with ADLs. Poor recall is inconsistent with limited awareness to memory deficits.    Subjective     Pt participated well throughout.     Patient goals: "get home"      Pain/Comfort:   No mild pain reported in hip, Pt given rest breaks as needed    Respiratory Status: Room air    Objective:     Co treatment with PT targeting mobility with cane and SLP targeting dual tasking, self awareness, and recall within more functional situations. Co-treatment necessary for increased Pt safety to target more functional tasks for improved generalization.     Pt conversed while walking with cane with min cues to continue movement while verbalizing/thinking. Some slowed gait noted but Pt was able to self-correct 100% of the time. Pt also noted to self correct posture at times.     Pt completed functional coffee making task with adequate requesting for physical assistance as needed and min cues to problem solve for equipment/carafe new to Pt. Pt organized 5 types of coffee products into appropriate bins. Good attention to task noted " throughout. Dual tasking resulted in mild slowing of organization with inclusion of conversation.    Has the patient been evaluated by SLP for swallowing?   No  Keep patient NPO? No     Goals:   Multidisciplinary Problems       SLP Goals          Problem: SLP    Goal Priority Disciplines Outcome   SLP Goal     SLP Progressing   Description: LTGs:  Pt will demonstrate improved recall and use of strategies for improved communication, safety, and participation in ADLs.    STGs:  Pt/family will participate in dementia education/training and complete teach-back for improved communication and strategy use to increase safety and participation in ADLs.   Pt will demonstrate recall and use of strategies with min cues for improved communication, safety, and participation in ADLs.                        Plan:   Con't POC.   Patient to be seen:   (3-5x/week)   Plan of Care expires:     Plan of Care reviewed with:  patient, daughter   SLP Follow-Up:  Yes       Discharge recommendations:    home with  care or nursing home  Barriers to Discharge:  Level of Skilled Assistance Needed   and Safety Awareness      Time Tracking:     SLP Treatment Date:   01/31/25  Speech Start Time:  1052  Speech Stop Time:  1116     Speech Total Time (min):  24 min    Billable Minutes: cog 24min / 2 units    Bebe Pappas MA, CCC-SLP  01/31/2025

## 2025-01-31 NOTE — PROGRESS NOTES
[facility] Surgical [facility]  [facility] Medicine  Progress Note    Patient Name: Brittany Nunn  MRN: [id number]  Patient Class: IP- Swing   Admission Date: 1/27/2025  Length of Stay: 4 days  Attending Physician: Radha Rapp DO  Primary Care Provider: Ester, Primary Doctor        Subjective     Principal Problem:Physical deconditioning        HPI:  89 yo female admitted to swing bed today for continued therapy.  She is s/p a fall at home in which she tripped and fell on her right shoulder.  No LOC. She was sent to [facility] where she was found to have an impacted humeral head/neck fracture with some mild subluxation.  This was determined to be non-surgical.  She does c/o pain to her shoulder otherwise she stats she is calm.  Currently no N/V/D/C.  No fever/chills.  NO chest pian/SOB.  She will be admitted for continued PT/OT services and pain control.    Overview/Hospital Course:  1/28/25: Pt was in the process of changing rooms when I saw her this morning. She was nauseous due to her right arm pain. She had vomited a small amount of bile. She denied any cp or sob. I spoke to her daughter this afternoon. She lives in [place]. There is a brother that has passed away, and another brother that lives in [place]. She states that they have been noticing a decline of their parents over the last year and are preparing for the fact that their living situation likely needs to change. She is available by phone if we have any questions. She states that the pt was using a walker, possibly daily, for ambulation. They were eating microwavable foods, mostly. For groceries they do grocery . Also states that since her TIA, the pt has had some urinary incontinence, requiring the use of some type of padding/diaper.    1/29/25: PT sitting up in her recliner during multidisciplinary rounds meeting. Daughter was on the phone. OT/ST is recommending 24 hour care for safety and ADLs. Daughter understands and will start  "working on this. Pt denies sob, cp, abd pain, constipation. Last bm yesterday. Normally has a bm oqd. She is only complaining of right arm pain.     1/30/25: Pt sitting up in her recliner. She asked me to open her diet coke. She said she cannot use her right hand at all to help her open it. Also said that her left hand isn't strong enough to pop open the tab. I pointed out that even these simple tasks are difficult for her now. She spoke to her daughter on the phone earlier this morning. I asked if they had started discussing a plan for care once she is discharged. She said she plans to stay here until her insurance "runs out." I asked what the plan was when she goes home. She said that they were looking at finding a family member that could come in for breakfast and then again around lunch. I reminded her that we are recommending 24 hour care. She said they did not discuss that. I recommended they do so. Reminded her of the options her daughter had mentioned yesterday of either a NH or for the pt to go and live with on the children. Pt said that "maybe someone could drive me to [place] to stay with my daughter-in-law, but she has 2 teenagers." I encouraged her to discuss this further with her daughter. No complaints at this time.    1/31/25: Pt sitting up in her recliner. No complaints this morning. CBG is 450s. Scheduled 5 units given. Post prandial, remains >400. Pt had pancakes for breakfast with syrup on the diabetic diet.  Pt informed the nurse that at home she takes metformin and glimepiride. These were not listed on her home meds upon admission. I will restart these.     Review of Systems   Constitutional:  Positive for activity change and fatigue. Negative for appetite change and fever.   HENT:  Positive for hearing loss.    Respiratory:  Negative for chest tightness and shortness of breath.    Cardiovascular:  Negative for chest pain and leg swelling.   Gastrointestinal:  Negative for abdominal distention, " abdominal pain, constipation, diarrhea, nausea and vomiting.   Musculoskeletal:  Positive for arthralgias and gait problem.   Skin:  Negative for rash and wound.     Objective:     Vital Signs (Most Recent):  Temp: 97.9 °F (36.6 °C) (01/31/25 0908)  Pulse: 106 (01/31/25 0908)  Resp: 18 (01/31/25 0908)  BP: 114/64 (01/31/25 0908)  SpO2: 97 % (01/31/25 0908) Vital Signs (24h Range):  Temp:  [97.9 °F (36.6 °C)-99.3 °F (37.4 °C)] 97.9 °F (36.6 °C)  Pulse:  [] 106  Resp:  [18-20] 18  SpO2:  [97 %-98 %] 97 %  BP: (114-128)/(64-71) 114/64     Weight: 69 kg (152 lb 1.9 oz)  Body mass index is 25.31 kg/m².    Intake/Output Summary (Last 24 hours) at 1/31/2025 1213  Last data filed at 1/31/2025 0829  Gross per 24 hour   Intake 480 ml   Output --   Net 480 ml         Physical Exam  Vitals reviewed. Exam conducted with a chaperone present.   Constitutional:       General: She is not in acute distress.     Appearance: Normal appearance. She is not ill-appearing.   HENT:      Head: Normocephalic and atraumatic.      Nose: Nose normal.   Eyes:      Conjunctiva/sclera: Conjunctivae normal.   Cardiovascular:      Rate and Rhythm: Normal rate and regular rhythm.      Pulses: Normal pulses.      Heart sounds: No murmur heard.  Pulmonary:      Effort: Pulmonary effort is normal.      Breath sounds: Normal breath sounds. No wheezing, rhonchi or rales.   Abdominal:      General: Bowel sounds are normal. There is no distension.      Palpations: Abdomen is soft.      Tenderness: There is no abdominal tenderness. There is no guarding.   Musculoskeletal:         General: Deformity and signs of injury present. No swelling.      Cervical back: Normal range of motion and neck supple.      Right lower leg: No edema.      Left lower leg: No edema.      Comments: Right arm in a sling. Decreased rom and pain.   Skin:     General: Skin is warm and dry.      Findings: No rash.   Neurological:      Mental Status: She is alert and oriented to  person, place, and time. Mental status is at baseline.      Gait: Gait abnormal.   Psychiatric:         Mood and Affect: Mood normal.         Thought Content: Thought content normal.         Judgment: Judgment normal.             Significant Labs: All pertinent labs within the past 24 hours have been reviewed.  Recent Lab Results  (Last 5 results in the past 24 hours)        01/31/25  1118   01/31/25  1021   01/31/25  0518   01/31/25  0500   01/30/25  2032        Albumin/Globulin Ratio       0.8         Albumin       3.2         ALP       82         ALT       14         Anion Gap       10.0         AST       18         Baso #       0.06         Basophil %       0.7         BILIRUBIN TOTAL       0.7         BUN       26.0         BUN/CREAT RATIO       31         Calcium       9.8         Chloride       100         CO2       28         Creatinine       0.84         eGFR       >60  Comment: Estimated GFR calculated using the CKD-EPI creatinine (2021) equation.         Eos #       0.09         Eos %       1.1         Globulin, Total       3.8         Glucose       273         Hematocrit       29.5         Hemoglobin       9.1         Immature Grans (Abs)       0.07         Immature Granulocytes       0.8         Lymph #       1.66         LYMPH %       19.4         MCH       24.7         MCHC       30.8         MCV       80.2         Mono #       0.75         Mono %       8.8         MPV       9.5         Neut #       5.92         Neut %       69.2         nRBC       0.0         Platelet Count       336         POCT Glucose 418   457   282     266       Potassium       4.2         PROTEIN TOTAL       7.0         RBC       3.68         RDW       18.6         Sodium       138         WBC       8.55                                Significant Imaging: I have reviewed all pertinent imaging results/findings within the past 24 hours.    Assessment and Plan     * Physical deconditioning  Admit to swing bed  OOB  PT/OT  Resume  transfer meds  DVT prophylaxis  Follow labs      Type 2 diabetes mellitus, with long-term current use of insulin  Resume lantus and aspart 5 units tid.  1/29/25: Increase lantus to 20units. CBGs >400. Continue to monitor.  1/30/25: Increase lantus to 22units.  this morning. Continue to monitor.  1/31/25: Pt states that she also takes metformin and glimepiride at home. These meds were added to her home med list and I am restarting them. I will decrease glargine back to home dose for tonight and re-evaluate in the morning. moderate sliding scale in place of scheduled aspart, as pt was requiring over her 5 units. Will monitor, may need to change to low.    Closed fracture of proximal end of right humerus  PT/OT  Recs as per ortho      Moderate malnutrition  Nutrition consulted. Most recent weight and BMI monitored-     Measurements:  Wt Readings from Last 1 Encounters:   01/28/25 69 kg (152 lb 1.9 oz)   Body mass index is 25.31 kg/m².    Patient has been screened and assessed by RD.    Malnutrition Type:  Context: acute illness or injury  Level: moderate    Malnutrition Characteristic Summary:  Weight Loss (Malnutrition): other (see comments) (Does not meet criteria)  Energy Intake (Malnutrition): less than or equal to 50% for greater than or equal to 5 days  Subcutaneous Fat (Malnutrition): mild depletion  Muscle Mass (Malnutrition): mild depletion  Fluid Accumulation (Malnutrition): other (see comments) (Not present)  Hand  Strength, Right (Malnutrition): Measurably reduced for age/gender    Interventions/Recommendations (treatment strategy):  Oral nutritional supplement;Care coordination or referral;Feeding assistance/management;Oral diet/nutrient modifications        VTE Risk Mitigation (From admission, onward)           Ordered     enoxaparin injection 40 mg  Daily         01/27/25 6882                    Discharge Planning   ISATU:      Code Status: Full Code   Medical Readiness for Discharge Date:    Discharge Plan A: Home with family, Home Health   Discharge Delays: None known at this time            Please place Justification for DME        FATMATA ARANDA DO  Department of Hospital Medicine   [facility] - Medical Surgical Unit

## 2025-01-31 NOTE — ASSESSMENT & PLAN NOTE
Resume lantus and aspart 5 units tid.  1/29/25: Increase lantus to 20units. CBGs >400. Continue to monitor.  1/30/25: Increase lantus to 22units.  this morning. Continue to monitor.  1/31/25: Pt states that she also takes metformin and glimepiride at home. These meds were added to her home med list and I am restarting them. I will decrease glargine back to home dose for tonight and re-evaluate in the morning. moderate sliding scale in place of scheduled aspart, as pt was requiring over her 5 units. Will monitor, may need to change to low.

## 2025-01-31 NOTE — SUBJECTIVE & OBJECTIVE
Review of Systems   Constitutional:  Positive for activity change and fatigue. Negative for appetite change and fever.   HENT:  Positive for hearing loss.    Respiratory:  Negative for chest tightness and shortness of breath.    Cardiovascular:  Negative for chest pain and leg swelling.   Gastrointestinal:  Negative for abdominal distention, abdominal pain, constipation, diarrhea, nausea and vomiting.   Musculoskeletal:  Positive for arthralgias and gait problem.   Skin:  Negative for rash and wound.     Objective:     Vital Signs (Most Recent):  Temp: 97.9 °F (36.6 °C) (01/31/25 0908)  Pulse: 106 (01/31/25 0908)  Resp: 18 (01/31/25 0908)  BP: 114/64 (01/31/25 0908)  SpO2: 97 % (01/31/25 0908) Vital Signs (24h Range):  Temp:  [97.9 °F (36.6 °C)-99.3 °F (37.4 °C)] 97.9 °F (36.6 °C)  Pulse:  [] 106  Resp:  [18-20] 18  SpO2:  [97 %-98 %] 97 %  BP: (114-128)/(64-71) 114/64     Weight: 69 kg (152 lb 1.9 oz)  Body mass index is 25.31 kg/m².    Intake/Output Summary (Last 24 hours) at 1/31/2025 1213  Last data filed at 1/31/2025 0829  Gross per 24 hour   Intake 480 ml   Output --   Net 480 ml         Physical Exam  Vitals reviewed. Exam conducted with a chaperone present.   Constitutional:       General: She is not in acute distress.     Appearance: Normal appearance. She is not ill-appearing.   HENT:      Head: Normocephalic and atraumatic.      Nose: Nose normal.   Eyes:      Conjunctiva/sclera: Conjunctivae normal.   Cardiovascular:      Rate and Rhythm: Normal rate and regular rhythm.      Pulses: Normal pulses.      Heart sounds: No murmur heard.  Pulmonary:      Effort: Pulmonary effort is normal.      Breath sounds: Normal breath sounds. No wheezing, rhonchi or rales.   Abdominal:      General: Bowel sounds are normal. There is no distension.      Palpations: Abdomen is soft.      Tenderness: There is no abdominal tenderness. There is no guarding.   Musculoskeletal:         General: Deformity and signs of  injury present. No swelling.      Cervical back: Normal range of motion and neck supple.      Right lower leg: No edema.      Left lower leg: No edema.      Comments: Right arm in a sling. Decreased rom and pain.   Skin:     General: Skin is warm and dry.      Findings: No rash.   Neurological:      Mental Status: She is alert and oriented to person, place, and time. Mental status is at baseline.      Gait: Gait abnormal.   Psychiatric:         Mood and Affect: Mood normal.         Thought Content: Thought content normal.         Judgment: Judgment normal.             Significant Labs: All pertinent labs within the past 24 hours have been reviewed.  Recent Lab Results  (Last 5 results in the past 24 hours)        01/31/25  1118   01/31/25  1021   01/31/25  0518   01/31/25  0500   01/30/25  2032        Albumin/Globulin Ratio       0.8         Albumin       3.2         ALP       82         ALT       14         Anion Gap       10.0         AST       18         Baso #       0.06         Basophil %       0.7         BILIRUBIN TOTAL       0.7         BUN       26.0         BUN/CREAT RATIO       31         Calcium       9.8         Chloride       100         CO2       28         Creatinine       0.84         eGFR       >60  Comment: Estimated GFR calculated using the CKD-EPI creatinine (2021) equation.         Eos #       0.09         Eos %       1.1         Globulin, Total       3.8         Glucose       273         Hematocrit       29.5         Hemoglobin       9.1         Immature Grans (Abs)       0.07         Immature Granulocytes       0.8         Lymph #       1.66         LYMPH %       19.4         MCH       24.7         MCHC       30.8         MCV       80.2         Mono #       0.75         Mono %       8.8         MPV       9.5         Neut #       5.92         Neut %       69.2         nRBC       0.0         Platelet Count       336         POCT Glucose 418   457   282     266       Potassium       4.2          PROTEIN TOTAL       7.0         RBC       3.68         RDW       18.6         Sodium       138         WBC       8.55                                Significant Imaging: I have reviewed all pertinent imaging results/findings within the past 24 hours.

## 2025-01-31 NOTE — PT/OT/SLP PROGRESS
Physical Therapy Treatment Note           Name: Brittany Nunn    : 1935 (89 y.o.)  MRN: [id number]             Subjective Assessment:     No complaints  Lethargic   X Awake, alert, cooperative  Uncooperative    Agitated X c/o pain    Appropriate X c/o fatigue   X Confused  Treated at bedside     Emotionally labile  Treated in gym/dept.    Impulsive  Other:    Flat affect       Pain/Comfort:    Location - Side 1: Right  Location 1: shoulder  Location 2: back    Therapy Precautions:     X Cognitive deficits  Spinal precautions    Collar - hard  Sternal precautions    Collar - soft   TLSO   X Fall risk  LSO    Hip precautions - posterior  Knee immobilizer    Hip precautions - anterior  WBAT    Impaired communication  Partial weightbearing    Oxygen  TTWB    PEG tube X NWB RUE in sling    Visual deficits  Other:    Hearing deficits               Mobility Training:     Assist Level Assistive Device Comments    Bed Mobility      Sit to stand/Stand to sit SBA SBQC Sit to stand/stand to sit. Pt stood from recliner level and from WC level.  Reminders provided to reach back prior to sitting.    Transfers SBA SBQC Stand step t/f performed from the WC to the recliner upon returning to the room.  Pt failed to reach back for the recliner prior to sitting.    Gait SBA SBQC Pt able to ambulate 125 ft with a step through gait pattern and slow gait speed.  Some mild unsteadiness observed, but no overt LOB noted.  Pt followed directional instructions provided by SLP and had to locate a specific destination within the hallways.     Balance Training SBA  Pt stood at the coffee station in the hallway and performed various tasks as instructed by SLP. Pt was able to maintain standing balance for several minutes, but did begin to express back discomfort and fatigue.     Wheelchair Mobility      Stair Climbing      Car Transfer      Other:           Assessment:     Patient tolerated session fairly well and participated in a  co-treatment with SLP.  PT assessed mobility while SLP assessed cognition and sequencing.  PT continues to recommend 24 hour care upon D/C from this facility.        GOALS:   Multidisciplinary Problems       Physical Therapy Goals          Problem: Physical Therapy    Goal Priority Disciplines Outcome Interventions   Physical Therapy Goal     PT, PT/OT Progressing    Description: Patient will increase functional independence with mobility by performin. Supine to sit with Stand-by Assistance  2. Sit to supine with Stand-by Assistance  3. Sit to stand transfer with Stand-by Assistance  4. Gait  x 75 feet with Stand-by Assistance using No Assistive Device vs SQBC.                            Discharge Recommendations:      24 hour care    Discharge Equipment Recommendations:      (TBD)     At end of treatment, patient remained:     X Up in chair     In bed   X With alarm activated    Bed rails up   X Call bell in reach      Family/friends present     Restraints secured properly     In bathroom with CNA/RN notified     In gym with PT/PTA/tech     Nurse aware     Other:           PT Start Time: 1052     PT Stop Time: 1116  PT Total Time (min): 24 min     Billable Minutes: Gait Training 14 and Therapeutic Activity 10      2025

## 2025-02-01 LAB
ALBUMIN SERPL-MCNC: 3 G/DL (ref 3.4–4.8)
ALBUMIN/GLOB SERPL: 0.9 RATIO (ref 1.1–2)
ALP SERPL-CCNC: 76 UNIT/L (ref 40–150)
ALT SERPL-CCNC: 15 UNIT/L (ref 0–55)
ANION GAP SERPL CALC-SCNC: 9 MEQ/L
AST SERPL-CCNC: 17 UNIT/L (ref 5–34)
BASOPHILS # BLD AUTO: 0.06 X10(3)/MCL
BASOPHILS NFR BLD AUTO: 0.7 %
BILIRUB SERPL-MCNC: 0.5 MG/DL
BUN SERPL-MCNC: 29 MG/DL (ref 9.8–20.1)
CALCIUM SERPL-MCNC: 9.8 MG/DL (ref 8.4–10.2)
CHLORIDE SERPL-SCNC: 101 MMOL/L (ref 98–107)
CO2 SERPL-SCNC: 31 MMOL/L (ref 23–31)
CREAT SERPL-MCNC: 0.84 MG/DL (ref 0.55–1.02)
CREAT/UREA NIT SERPL: 35
EOSINOPHIL # BLD AUTO: 0.29 X10(3)/MCL (ref 0–0.9)
EOSINOPHIL NFR BLD AUTO: 3.4 %
ERYTHROCYTE [DISTWIDTH] IN BLOOD BY AUTOMATED COUNT: 19.6 % (ref 11.5–17)
GFR SERPLBLD CREATININE-BSD FMLA CKD-EPI: >60 ML/MIN/1.73/M2
GLOBULIN SER-MCNC: 3.5 GM/DL (ref 2.4–3.5)
GLUCOSE SERPL-MCNC: 189 MG/DL (ref 82–115)
HCT VFR BLD AUTO: 28.9 % (ref 37–47)
HGB BLD-MCNC: 8.8 G/DL (ref 12–16)
IMM GRANULOCYTES # BLD AUTO: 0.07 X10(3)/MCL (ref 0–0.04)
IMM GRANULOCYTES NFR BLD AUTO: 0.8 %
LYMPHOCYTES # BLD AUTO: 3.73 X10(3)/MCL (ref 0.6–4.6)
LYMPHOCYTES NFR BLD AUTO: 43.6 %
MCH RBC QN AUTO: 24.9 PG (ref 27–31)
MCHC RBC AUTO-ENTMCNC: 30.4 G/DL (ref 33–36)
MCV RBC AUTO: 81.9 FL (ref 80–94)
MONOCYTES # BLD AUTO: 0.62 X10(3)/MCL (ref 0.1–1.3)
MONOCYTES NFR BLD AUTO: 7.2 %
NEUTROPHILS # BLD AUTO: 3.79 X10(3)/MCL (ref 2.1–9.2)
NEUTROPHILS NFR BLD AUTO: 44.3 %
NRBC BLD AUTO-RTO: 0 %
PLATELET # BLD AUTO: 320 X10(3)/MCL (ref 130–400)
PMV BLD AUTO: 9.1 FL (ref 7.4–10.4)
POCT GLUCOSE: 136 MG/DL (ref 70–110)
POCT GLUCOSE: 188 MG/DL (ref 70–110)
POCT GLUCOSE: 222 MG/DL (ref 70–110)
POCT GLUCOSE: 252 MG/DL (ref 70–110)
POTASSIUM SERPL-SCNC: 4 MMOL/L (ref 3.5–5.1)
PROT SERPL-MCNC: 6.5 GM/DL (ref 5.8–7.6)
RBC # BLD AUTO: 3.53 X10(6)/MCL (ref 4.2–5.4)
SODIUM SERPL-SCNC: 141 MMOL/L (ref 136–145)
WBC # BLD AUTO: 8.56 X10(3)/MCL (ref 4.5–11.5)

## 2025-02-01 PROCEDURE — 97116 GAIT TRAINING THERAPY: CPT

## 2025-02-01 PROCEDURE — 80053 COMPREHEN METABOLIC PANEL: CPT | Performed by: INTERNAL MEDICINE

## 2025-02-01 PROCEDURE — 97530 THERAPEUTIC ACTIVITIES: CPT

## 2025-02-01 PROCEDURE — 63600175 PHARM REV CODE 636 W HCPCS: Performed by: INTERNAL MEDICINE

## 2025-02-01 PROCEDURE — 85025 COMPLETE CBC W/AUTO DIFF WBC: CPT | Performed by: INTERNAL MEDICINE

## 2025-02-01 PROCEDURE — 36415 COLL VENOUS BLD VENIPUNCTURE: CPT | Performed by: INTERNAL MEDICINE

## 2025-02-01 PROCEDURE — 11000004 HC SNF PRIVATE

## 2025-02-01 PROCEDURE — 25000003 PHARM REV CODE 250: Performed by: INTERNAL MEDICINE

## 2025-02-01 RX ORDER — DOCUSATE SODIUM 100 MG/1
100 CAPSULE, LIQUID FILLED ORAL DAILY PRN
Status: DISCONTINUED | OUTPATIENT
Start: 2025-02-01 | End: 2025-02-07 | Stop reason: HOSPADM

## 2025-02-01 RX ADMIN — LISINOPRIL 5 MG: 5 TABLET ORAL at 08:02

## 2025-02-01 RX ADMIN — DOCUSATE SODIUM 100 MG: 100 CAPSULE, LIQUID FILLED ORAL at 08:02

## 2025-02-01 RX ADMIN — Medication 6 MG: at 08:02

## 2025-02-01 RX ADMIN — INSULIN ASPART 4 UNITS: 100 INJECTION, SOLUTION INTRAVENOUS; SUBCUTANEOUS at 11:02

## 2025-02-01 RX ADMIN — HYDROCODONE BITARTRATE AND ACETAMINOPHEN 1 TABLET: 5; 325 TABLET ORAL at 08:02

## 2025-02-01 RX ADMIN — LEVOTHYROXINE SODIUM 100 MCG: 0.1 TABLET ORAL at 06:02

## 2025-02-01 RX ADMIN — METFORMIN HYDROCHLORIDE 500 MG: 500 TABLET ORAL at 08:02

## 2025-02-01 RX ADMIN — ALUMINUM HYDROXIDE, MAGNESIUM HYDROXIDE, AND DIMETHICONE 30 ML: 200; 20; 200 SUSPENSION ORAL at 04:02

## 2025-02-01 RX ADMIN — MICONAZOLE NITRATE: 20 POWDER TOPICAL at 09:02

## 2025-02-01 RX ADMIN — ALUMINUM HYDROXIDE, MAGNESIUM HYDROXIDE, AND DIMETHICONE 30 ML: 200; 20; 200 SUSPENSION ORAL at 11:02

## 2025-02-01 RX ADMIN — ALUMINUM HYDROXIDE, MAGNESIUM HYDROXIDE, AND DIMETHICONE 30 ML: 200; 20; 200 SUSPENSION ORAL at 08:02

## 2025-02-01 RX ADMIN — ATORVASTATIN CALCIUM 20 MG: 10 TABLET, FILM COATED ORAL at 08:02

## 2025-02-01 RX ADMIN — MICONAZOLE NITRATE: 20 POWDER TOPICAL at 08:02

## 2025-02-01 RX ADMIN — GLIMEPIRIDE 4 MG: 2 TABLET ORAL at 06:02

## 2025-02-01 RX ADMIN — ENOXAPARIN SODIUM 40 MG: 40 INJECTION SUBCUTANEOUS at 05:02

## 2025-02-01 RX ADMIN — MEMANTINE 10 MG: 5 TABLET ORAL at 08:02

## 2025-02-01 RX ADMIN — ACETAMINOPHEN 650 MG: 325 TABLET, FILM COATED ORAL at 08:02

## 2025-02-01 RX ADMIN — ALUMINUM HYDROXIDE, MAGNESIUM HYDROXIDE, AND DIMETHICONE 30 ML: 200; 20; 200 SUSPENSION ORAL at 06:02

## 2025-02-01 RX ADMIN — METFORMIN HYDROCHLORIDE 500 MG: 500 TABLET ORAL at 05:02

## 2025-02-01 RX ADMIN — INSULIN GLARGINE 20 UNITS: 100 INJECTION, SOLUTION SUBCUTANEOUS at 08:02

## 2025-02-01 NOTE — PT/OT/SLP PROGRESS
Physical Therapy Treatment Note           Name: Brittany Nunn    : 1935 (89 y.o.)  MRN: [id number]             Subjective Assessment:     No complaints  Lethargic   x Awake, alert, cooperative  Uncooperative    Agitated x c/o pain    Appropriate x c/o fatigue    Confused  Treated at bedside     Emotionally labile  Treated in gym/dept.    Impulsive  Other:    Flat affect       Pain/Comfort:    Pain Rating 1: 4/10  Location - Side 1: Right  Location 1: shoulder  Pain Addressed 1: Reposition    Therapy Precautions:     x Cognitive deficits  Spinal precautions    Collar - hard  Sternal precautions    Collar - soft   TLSO   x Fall risk  LSO    Hip precautions - posterior  Knee immobilizer    Hip precautions - anterior  WBAT    Impaired communication  Partial weightbearing    Oxygen  TTWB    PEG tube x NWB RUE    Visual deficits  Other:   x Hearing deficits        Vital Signs:     Vitals Value    Room air      Oxygen (L)     Blood pressure     Heart rate          Mobility Training:     Assist level Comments    Bed mobility     Sit to stand/stand to sit SBA  Patient completed sit to stand from chair in room x 4 reps with SBA and verbal cues for NWB to RUE.     Transfers     Gait SBA Gait training over level surfaces with SBQC for 2 x 150 feet with verbal cues for posture, step length and sequencing.    Balance training     Wheelchair mobility     Car transfer     Other:          Therapeutic Exercise:     Exercise Sets Reps Comments                               Additional Treatment:    Patient continues to demonstrate deficits in strength and mobility that are increasing her burden of care and increasing her risk for falls. Continued therapy is required to improve functional mobility.     Assessment:     Patient tolerated session good .    PT Plan:     Continue with current POC    GOALS:   Multidisciplinary Problems       Physical Therapy Goals          Problem: Physical Therapy    Goal Priority  Disciplines Outcome Interventions   Physical Therapy Goal     PT, PT/OT Progressing    Description: Patient will increase functional independence with mobility by performin. Supine to sit with Stand-by Assistance  2. Sit to supine with Stand-by Assistance  3. Sit to stand transfer with Stand-by Assistance  4. Gait  x 75 feet with Stand-by Assistance using No Assistive Device vs SQBC.                            Discharge Recommendations:            Discharge Equipment Recommendations:      (TBD)     At end of treatment, patient remained:     x Up in chair     In bed   x With alarm activated    Bed rails up   x Call bell in reach      Family/friends present     Restraints secured properly     In bathroom with CNA/RN notified     In gym with PT/PTA/tech    x Nurse aware     Other:           PT Start Time: 835     PT Stop Time: 905  PT Total Time (min): 30 min     Billable Minutes: Gait Training 20 and Therapeutic Activity 10      2025

## 2025-02-01 NOTE — PROGRESS NOTES
[facility]  [facility] Medicine  Progress Note    Patient Name: Brittany Nunn  MRN: [id number]  Patient Class: IP- Swing   Admission Date: 1/27/2025  Length of Stay: 5 days  Attending Physician: Radha Rapp DO  Primary Care Provider: Ester, Primary Doctor        Subjective     Principal Problem:Physical deconditioning        HPI:  88 yo female admitted to swing bed today for continued therapy.  She is s/p a fall at home in which she tripped and fell on her right shoulder.  No LOC. She was sent to [facility] where she was found to have an impacted humeral head/neck fracture with some mild subluxation.  This was determined to be non-surgical.  She does c/o pain to her shoulder otherwise she stats she is calm.  Currently no N/V/D/C.  No fever/chills.  NO chest pian/SOB.  She will be admitted for continued PT/OT services and pain control.    Overview/Hospital Course:  1/28/25: Pt was in the process of changing rooms when I saw her this morning. She was nauseous due to her right arm pain. She had vomited a small amount of bile. She denied any cp or sob. I spoke to her daughter this afternoon. She lives in [place]. There is a brother that has passed away, and another brother that lives in [place]. She states that they have been noticing a decline of their parents over the last year and are preparing for the fact that their living situation likely needs to change. She is available by phone if we have any questions. She states that the pt was using a walker, possibly daily, for ambulation. They were eating microwavable foods, mostly. For groceries they do grocery . Also states that since her TIA, the pt has had some urinary incontinence, requiring the use of some type of padding/diaper.    1/29/25: PT sitting up in her recliner during multidisciplinary rounds meeting. Daughter was on the phone. OT/ST is recommending 24 hour care for safety and ADLs. Daughter understands and will start  "working on this. Pt denies sob, cp, abd pain, constipation. Last bm yesterday. Normally has a bm oqd. She is only complaining of right arm pain.     1/30/25: Pt sitting up in her recliner. She asked me to open her diet coke. She said she cannot use her right hand at all to help her open it. Also said that her left hand isn't strong enough to pop open the tab. I pointed out that even these simple tasks are difficult for her now. She spoke to her daughter on the phone earlier this morning. I asked if they had started discussing a plan for care once she is discharged. She said she plans to stay here until her insurance "runs out." I asked what the plan was when she goes home. She said that they were looking at finding a family member that could come in for breakfast and then again around lunch. I reminded her that we are recommending 24 hour care. She said they did not discuss that. I recommended they do so. Reminded her of the options her daughter had mentioned yesterday of either a NH or for the pt to go and live with on the children. Pt said that "maybe someone could drive me to [place] to stay with my daughter-in-law, but she has 2 teenagers." I encouraged her to discuss this further with her daughter. No complaints at this time.    1/31/25: Pt sitting up in her recliner. No complaints this morning. CBG is 450s. Scheduled 5 units given. Post prandial, remains >400. Pt had pancakes for breakfast with syrup on the diabetic diet.  Pt informed the nurse that at home she takes metformin and glimepiride. These were not listed on her home meds upon admission. I will restart these.     2/1/25: Pt sitting up in her recliner. States that her  is coming to visit her today. She is happy about this. Her grand daughter is bringing him. She is having bm's more than usual. I will change the docusate to prn. No other complaints.    Review of Systems   Constitutional:  Positive for activity change and fatigue. Negative for " appetite change and fever.   HENT:  Positive for hearing loss.    Respiratory:  Negative for chest tightness and shortness of breath.    Cardiovascular:  Negative for chest pain and leg swelling.   Gastrointestinal:  Negative for abdominal distention, abdominal pain, constipation, diarrhea, nausea and vomiting.   Musculoskeletal:  Positive for arthralgias. Negative for gait problem.   Skin:  Negative for rash and wound.     Objective:     Vital Signs (Most Recent):  Temp: 97.8 °F (36.6 °C) (02/01/25 0837)  Pulse: 101 (02/01/25 0837)  Resp: 20 (02/01/25 0815)  BP: (!) 92/55 (02/01/25 0837)  SpO2: 97 % (02/01/25 0837) Vital Signs (24h Range):  Temp:  [97.8 °F (36.6 °C)] 97.8 °F (36.6 °C)  Pulse:  [] 101  Resp:  [20] 20  SpO2:  [97 %] 97 %  BP: ()/(55-75) 92/55     Weight: 69 kg (152 lb 1.9 oz)  Body mass index is 25.31 kg/m².    Intake/Output Summary (Last 24 hours) at 2/1/2025 1110  Last data filed at 2/1/2025 0800  Gross per 24 hour   Intake 1080 ml   Output --   Net 1080 ml         Physical Exam  Vitals reviewed. Exam conducted with a chaperone present.   Constitutional:       General: She is not in acute distress.     Appearance: Normal appearance. She is not ill-appearing.   HENT:      Head: Normocephalic and atraumatic.      Nose: Nose normal.   Eyes:      Conjunctiva/sclera: Conjunctivae normal.   Cardiovascular:      Rate and Rhythm: Normal rate and regular rhythm.      Pulses: Normal pulses.      Heart sounds: No murmur heard.  Pulmonary:      Effort: Pulmonary effort is normal.      Breath sounds: Normal breath sounds. No wheezing, rhonchi or rales.   Abdominal:      General: Bowel sounds are normal. There is no distension.      Palpations: Abdomen is soft.      Tenderness: There is no abdominal tenderness. There is no guarding.   Musculoskeletal:         General: Deformity and signs of injury present. No swelling.      Cervical back: Normal range of motion and neck supple.      Right lower leg:  No edema.      Left lower leg: No edema.      Comments: Right arm in a sling. Decreased rom and pain.   Skin:     General: Skin is warm and dry.      Findings: No rash.   Neurological:      Mental Status: She is alert and oriented to person, place, and time. Mental status is at baseline.      Gait: Gait abnormal.   Psychiatric:         Mood and Affect: Mood normal.         Thought Content: Thought content normal.         Judgment: Judgment normal.             Significant Labs: All pertinent labs within the past 24 hours have been reviewed.  Recent Lab Results  (Last 5 results in the past 24 hours)        02/01/25  1033   02/01/25  0619   02/01/25  0512   01/31/25  2030   01/31/25  1633        Albumin/Globulin Ratio     0.9           Albumin     3.0           ALP     76           ALT     15           Anion Gap     9.0           AST     17           Baso #     0.06           Basophil %     0.7           BILIRUBIN TOTAL     0.5           BUN     29.0           BUN/CREAT RATIO     35           Calcium     9.8           Chloride     101           CO2     31           Creatinine     0.84           eGFR     >60  Comment: Estimated GFR calculated using the CKD-EPI creatinine (2021) equation.           Eos #     0.29           Eos %     3.4           Globulin, Total     3.5           Glucose     189           Hematocrit     28.9           Hemoglobin     8.8           Immature Grans (Abs)     0.07           Immature Granulocytes     0.8           Lymph #     3.73           LYMPH %     43.6           MCH     24.9           MCHC     30.4           MCV     81.9           Mono #     0.62           Mono %     7.2           MPV     9.1           Neut #     3.79           Neut %     44.3           nRBC     0.0           Platelet Count     320           POCT Glucose 222   188     334   139       Potassium     4.0           PROTEIN TOTAL     6.5           RBC     3.53           RDW     19.6           Sodium     141           WBC      8.56                                  Significant Imaging: I have reviewed all pertinent imaging results/findings within the past 24 hours.    Assessment and Plan     * Physical deconditioning  Admit to swing bed  OOB  PT/OT  Resume transfer meds  DVT prophylaxis  Follow labs      Type 2 diabetes mellitus, with long-term current use of insulin  Resume lantus and aspart 5 units tid.  1/29/25: Increase lantus to 20units. CBGs >400. Continue to monitor.  1/30/25: Increase lantus to 22units.  this morning. Continue to monitor.  1/31/25: Pt states that she also takes metformin and glimepiride at home. These meds were added to her home med list and I am restarting them. I will decrease glargine back to home dose for tonight and re-evaluate in the morning. moderate sliding scale in place of scheduled aspart, as pt was requiring over her 5 units. Will monitor, may need to change to low.    Closed fracture of proximal end of right humerus  PT/OT  Recs as per ortho      Moderate malnutrition  Nutrition consulted. Most recent weight and BMI monitored-     Measurements:  Wt Readings from Last 1 Encounters:   01/28/25 69 kg (152 lb 1.9 oz)   Body mass index is 25.31 kg/m².    Patient has been screened and assessed by RD.    Malnutrition Type:  Context: acute illness or injury  Level: moderate    Malnutrition Characteristic Summary:  Weight Loss (Malnutrition): other (see comments) (Does not meet criteria)  Energy Intake (Malnutrition): less than or equal to 50% for greater than or equal to 5 days  Subcutaneous Fat (Malnutrition): mild depletion  Muscle Mass (Malnutrition): mild depletion  Fluid Accumulation (Malnutrition): other (see comments) (Not present)  Hand  Strength, Right (Malnutrition): Measurably reduced for age/gender    Interventions/Recommendations (treatment strategy):  Oral nutritional supplement;Care coordination or referral;Feeding assistance/management;Oral diet/nutrient modifications        VTE Risk  Mitigation (From admission, onward)           Ordered     enoxaparin injection 40 mg  Daily         01/27/25 6032                    Discharge Planning   ISATU:      Code Status: Full Code   Medical Readiness for Discharge Date:   Discharge Plan A: Home with family, Home Health   Discharge Delays: None known at this time            Please place Justification for DME        FATMATA ARANDA DO  Department of Hospital Medicine   [facility]

## 2025-02-01 NOTE — SUBJECTIVE & OBJECTIVE
Review of Systems   Constitutional:  Positive for activity change and fatigue. Negative for appetite change and fever.   HENT:  Positive for hearing loss.    Respiratory:  Negative for chest tightness and shortness of breath.    Cardiovascular:  Negative for chest pain and leg swelling.   Gastrointestinal:  Negative for abdominal distention, abdominal pain, constipation, diarrhea, nausea and vomiting.   Musculoskeletal:  Positive for arthralgias. Negative for gait problem.   Skin:  Negative for rash and wound.     Objective:     Vital Signs (Most Recent):  Temp: 97.8 °F (36.6 °C) (02/01/25 0837)  Pulse: 101 (02/01/25 0837)  Resp: 20 (02/01/25 0815)  BP: (!) 92/55 (02/01/25 0837)  SpO2: 97 % (02/01/25 0837) Vital Signs (24h Range):  Temp:  [97.8 °F (36.6 °C)] 97.8 °F (36.6 °C)  Pulse:  [] 101  Resp:  [20] 20  SpO2:  [97 %] 97 %  BP: ()/(55-75) 92/55     Weight: 69 kg (152 lb 1.9 oz)  Body mass index is 25.31 kg/m².    Intake/Output Summary (Last 24 hours) at 2/1/2025 1110  Last data filed at 2/1/2025 0800  Gross per 24 hour   Intake 1080 ml   Output --   Net 1080 ml         Physical Exam  Vitals reviewed. Exam conducted with a chaperone present.   Constitutional:       General: She is not in acute distress.     Appearance: Normal appearance. She is not ill-appearing.   HENT:      Head: Normocephalic and atraumatic.      Nose: Nose normal.   Eyes:      Conjunctiva/sclera: Conjunctivae normal.   Cardiovascular:      Rate and Rhythm: Normal rate and regular rhythm.      Pulses: Normal pulses.      Heart sounds: No murmur heard.  Pulmonary:      Effort: Pulmonary effort is normal.      Breath sounds: Normal breath sounds. No wheezing, rhonchi or rales.   Abdominal:      General: Bowel sounds are normal. There is no distension.      Palpations: Abdomen is soft.      Tenderness: There is no abdominal tenderness. There is no guarding.   Musculoskeletal:         General: Deformity and signs of injury present. No  swelling.      Cervical back: Normal range of motion and neck supple.      Right lower leg: No edema.      Left lower leg: No edema.      Comments: Right arm in a sling. Decreased rom and pain.   Skin:     General: Skin is warm and dry.      Findings: No rash.   Neurological:      Mental Status: She is alert and oriented to person, place, and time. Mental status is at baseline.      Gait: Gait abnormal.   Psychiatric:         Mood and Affect: Mood normal.         Thought Content: Thought content normal.         Judgment: Judgment normal.             Significant Labs: All pertinent labs within the past 24 hours have been reviewed.  Recent Lab Results  (Last 5 results in the past 24 hours)        02/01/25  1033   02/01/25  0619   02/01/25  0512   01/31/25  2030   01/31/25  1633        Albumin/Globulin Ratio     0.9           Albumin     3.0           ALP     76           ALT     15           Anion Gap     9.0           AST     17           Baso #     0.06           Basophil %     0.7           BILIRUBIN TOTAL     0.5           BUN     29.0           BUN/CREAT RATIO     35           Calcium     9.8           Chloride     101           CO2     31           Creatinine     0.84           eGFR     >60  Comment: Estimated GFR calculated using the CKD-EPI creatinine (2021) equation.           Eos #     0.29           Eos %     3.4           Globulin, Total     3.5           Glucose     189           Hematocrit     28.9           Hemoglobin     8.8           Immature Grans (Abs)     0.07           Immature Granulocytes     0.8           Lymph #     3.73           LYMPH %     43.6           MCH     24.9           MCHC     30.4           MCV     81.9           Mono #     0.62           Mono %     7.2           MPV     9.1           Neut #     3.79           Neut %     44.3           nRBC     0.0           Platelet Count     320           POCT Glucose 222   188     334   139       Potassium     4.0           PROTEIN TOTAL      6.5           RBC     3.53           RDW     19.6           Sodium     141           WBC     8.56                                  Significant Imaging: I have reviewed all pertinent imaging results/findings within the past 24 hours.

## 2025-02-02 LAB
ALBUMIN SERPL-MCNC: 3 G/DL (ref 3.4–4.8)
ALBUMIN/GLOB SERPL: 0.9 RATIO (ref 1.1–2)
ALP SERPL-CCNC: 78 UNIT/L (ref 40–150)
ALT SERPL-CCNC: 13 UNIT/L (ref 0–55)
ANION GAP SERPL CALC-SCNC: 9 MEQ/L
AST SERPL-CCNC: 18 UNIT/L (ref 5–34)
BASOPHILS # BLD AUTO: 0.07 X10(3)/MCL
BASOPHILS NFR BLD AUTO: 0.9 %
BILIRUB SERPL-MCNC: 0.4 MG/DL
BUN SERPL-MCNC: 33 MG/DL (ref 9.8–20.1)
CALCIUM SERPL-MCNC: 10.1 MG/DL (ref 8.4–10.2)
CHLORIDE SERPL-SCNC: 101 MMOL/L (ref 98–107)
CO2 SERPL-SCNC: 32 MMOL/L (ref 23–31)
CREAT SERPL-MCNC: 0.78 MG/DL (ref 0.55–1.02)
CREAT/UREA NIT SERPL: 42
EOSINOPHIL # BLD AUTO: 0.29 X10(3)/MCL (ref 0–0.9)
EOSINOPHIL NFR BLD AUTO: 3.6 %
ERYTHROCYTE [DISTWIDTH] IN BLOOD BY AUTOMATED COUNT: 19.6 % (ref 11.5–17)
GFR SERPLBLD CREATININE-BSD FMLA CKD-EPI: >60 ML/MIN/1.73/M2
GLOBULIN SER-MCNC: 3.5 GM/DL (ref 2.4–3.5)
GLUCOSE SERPL-MCNC: 123 MG/DL (ref 82–115)
HCT VFR BLD AUTO: 29.6 % (ref 37–47)
HGB BLD-MCNC: 8.9 G/DL (ref 12–16)
IMM GRANULOCYTES # BLD AUTO: 0.07 X10(3)/MCL (ref 0–0.04)
IMM GRANULOCYTES NFR BLD AUTO: 0.9 %
LYMPHOCYTES # BLD AUTO: 2.91 X10(3)/MCL (ref 0.6–4.6)
LYMPHOCYTES NFR BLD AUTO: 35.8 %
MCH RBC QN AUTO: 25 PG (ref 27–31)
MCHC RBC AUTO-ENTMCNC: 30.1 G/DL (ref 33–36)
MCV RBC AUTO: 83.1 FL (ref 80–94)
MONOCYTES # BLD AUTO: 0.65 X10(3)/MCL (ref 0.1–1.3)
MONOCYTES NFR BLD AUTO: 8 %
NEUTROPHILS # BLD AUTO: 4.13 X10(3)/MCL (ref 2.1–9.2)
NEUTROPHILS NFR BLD AUTO: 50.8 %
NRBC BLD AUTO-RTO: 0 %
PLATELET # BLD AUTO: 317 X10(3)/MCL (ref 130–400)
PMV BLD AUTO: 9.1 FL (ref 7.4–10.4)
POCT GLUCOSE: 115 MG/DL (ref 70–110)
POCT GLUCOSE: 118 MG/DL (ref 70–110)
POCT GLUCOSE: 161 MG/DL (ref 70–110)
POCT GLUCOSE: 167 MG/DL (ref 70–110)
POTASSIUM SERPL-SCNC: 4.2 MMOL/L (ref 3.5–5.1)
PROT SERPL-MCNC: 6.5 GM/DL (ref 5.8–7.6)
RBC # BLD AUTO: 3.56 X10(6)/MCL (ref 4.2–5.4)
SODIUM SERPL-SCNC: 142 MMOL/L (ref 136–145)
WBC # BLD AUTO: 8.12 X10(3)/MCL (ref 4.5–11.5)

## 2025-02-02 PROCEDURE — 63600175 PHARM REV CODE 636 W HCPCS: Performed by: INTERNAL MEDICINE

## 2025-02-02 PROCEDURE — 25000003 PHARM REV CODE 250: Performed by: INTERNAL MEDICINE

## 2025-02-02 PROCEDURE — 36415 COLL VENOUS BLD VENIPUNCTURE: CPT | Performed by: INTERNAL MEDICINE

## 2025-02-02 PROCEDURE — 80053 COMPREHEN METABOLIC PANEL: CPT | Performed by: INTERNAL MEDICINE

## 2025-02-02 PROCEDURE — 85025 COMPLETE CBC W/AUTO DIFF WBC: CPT | Performed by: INTERNAL MEDICINE

## 2025-02-02 PROCEDURE — 11000004 HC SNF PRIVATE

## 2025-02-02 RX ADMIN — HYDROCODONE BITARTRATE AND ACETAMINOPHEN 1 TABLET: 5; 325 TABLET ORAL at 08:02

## 2025-02-02 RX ADMIN — ALUMINUM HYDROXIDE, MAGNESIUM HYDROXIDE, AND DIMETHICONE 30 ML: 200; 20; 200 SUSPENSION ORAL at 12:02

## 2025-02-02 RX ADMIN — ALUMINUM HYDROXIDE, MAGNESIUM HYDROXIDE, AND DIMETHICONE 30 ML: 200; 20; 200 SUSPENSION ORAL at 06:02

## 2025-02-02 RX ADMIN — METFORMIN HYDROCHLORIDE 500 MG: 500 TABLET ORAL at 07:02

## 2025-02-02 RX ADMIN — HYDROCODONE BITARTRATE AND ACETAMINOPHEN 1 TABLET: 5; 325 TABLET ORAL at 09:02

## 2025-02-02 RX ADMIN — METFORMIN HYDROCHLORIDE 500 MG: 500 TABLET ORAL at 05:02

## 2025-02-02 RX ADMIN — ALUMINUM HYDROXIDE, MAGNESIUM HYDROXIDE, AND DIMETHICONE 30 ML: 200; 20; 200 SUSPENSION ORAL at 05:02

## 2025-02-02 RX ADMIN — LEVOTHYROXINE SODIUM 100 MCG: 0.1 TABLET ORAL at 06:02

## 2025-02-02 RX ADMIN — INSULIN GLARGINE 20 UNITS: 100 INJECTION, SOLUTION SUBCUTANEOUS at 09:02

## 2025-02-02 RX ADMIN — MEMANTINE 10 MG: 5 TABLET ORAL at 08:02

## 2025-02-02 RX ADMIN — MICONAZOLE NITRATE: 20 POWDER TOPICAL at 08:02

## 2025-02-02 RX ADMIN — ATORVASTATIN CALCIUM 20 MG: 10 TABLET, FILM COATED ORAL at 08:02

## 2025-02-02 RX ADMIN — GLIMEPIRIDE 4 MG: 2 TABLET ORAL at 06:02

## 2025-02-02 RX ADMIN — ENOXAPARIN SODIUM 40 MG: 40 INJECTION SUBCUTANEOUS at 05:02

## 2025-02-02 RX ADMIN — INSULIN ASPART 2 UNITS: 100 INJECTION, SOLUTION INTRAVENOUS; SUBCUTANEOUS at 12:02

## 2025-02-02 RX ADMIN — LISINOPRIL 5 MG: 5 TABLET ORAL at 08:02

## 2025-02-02 RX ADMIN — MEMANTINE 10 MG: 5 TABLET ORAL at 09:02

## 2025-02-02 RX ADMIN — ALUMINUM HYDROXIDE, MAGNESIUM HYDROXIDE, AND DIMETHICONE 30 ML: 200; 20; 200 SUSPENSION ORAL at 09:02

## 2025-02-02 RX ADMIN — MICONAZOLE NITRATE: 20 POWDER TOPICAL at 09:02

## 2025-02-02 NOTE — PROGRESS NOTES
[facility]  [facility]  Progress Note    Patient Name: Brittany Nunn  MRN: [id number]  Patient Class: IP- Swing   Admission Date: 1/27/2025  Length of Stay: 6 days  Attending Physician: Radha Rapp DO  Primary Care Provider: Ester, Primary Doctor        Subjective     Principal Problem:Physical deconditioning        HPI:  88 yo female admitted to swing bed today for continued therapy.  She is s/p a fall at home in which she tripped and fell on her right shoulder.  No LOC. She was sent to [facility] where she was found to have an impacted humeral head/neck fracture with some mild subluxation.  This was determined to be non-surgical.  She does c/o pain to her shoulder otherwise she stats she is calm.  Currently no N/V/D/C.  No fever/chills.  NO chest pian/SOB.  She will be admitted for continued PT/OT services and pain control.    Overview/Hospital Course:  1/28/25: Pt was in the process of changing rooms when I saw her this morning. She was nauseous due to her right arm pain. She had vomited a small amount of bile. She denied any cp or sob. I spoke to her daughter this afternoon. She lives in [place]. There is a brother that has passed away, and another brother that lives in [place]. She states that they have been noticing a decline of their parents over the last year and are preparing for the fact that their living situation likely needs to change. She is available by phone if we have any questions. She states that the pt was using a walker, possibly daily, for ambulation. They were eating microwavable foods, mostly. For groceries they do grocery . Also states that since her TIA, the pt has had some urinary incontinence, requiring the use of some type of padding/diaper.    1/29/25: PT sitting up in her recliner during multidisciplinary rounds meeting. Daughter was on the phone. OT/ST is recommending 24 hour care for safety and ADLs. Daughter understands and will start  "working on this. Pt denies sob, cp, abd pain, constipation. Last bm yesterday. Normally has a bm oqd. She is only complaining of right arm pain.     1/30/25: Pt sitting up in her recliner. She asked me to open her diet coke. She said she cannot use her right hand at all to help her open it. Also said that her left hand isn't strong enough to pop open the tab. I pointed out that even these simple tasks are difficult for her now. She spoke to her daughter on the phone earlier this morning. I asked if they had started discussing a plan for care once she is discharged. She said she plans to stay here until her insurance "runs out." I asked what the plan was when she goes home. She said that they were looking at finding a family member that could come in for breakfast and then again around lunch. I reminded her that we are recommending 24 hour care. She said they did not discuss that. I recommended they do so. Reminded her of the options her daughter had mentioned yesterday of either a NH or for the pt to go and live with on the children. Pt said that "maybe someone could drive me to [place] to stay with my daughter-in-law, but she has 2 teenagers." I encouraged her to discuss this further with her daughter. No complaints at this time.    1/31/25: Pt sitting up in her recliner. No complaints this morning. CBG is 450s. Scheduled 5 units given. Post prandial, remains >400. Pt had pancakes for breakfast with syrup on the diabetic diet.  Pt informed the nurse that at home she takes metformin and glimepiride. These were not listed on her home meds upon admission. I will restart these.     2/1/25: Pt sitting up in her recliner. States that her  is coming to visit her today. She is happy about this. Her grand daughter is bringing him. She is having bm's more than usual. I will change the docusate to prn. No other complaints.    2/2/25: Pt lying in bed. She had a nice visit with her  yesterday. He is in a wheel " chair. She laughed and said he asked if he could stay the night with her in the hospital. She has no complaints this morning.     Review of Systems   Constitutional:  Positive for activity change and fatigue. Negative for appetite change and fever.   HENT:  Positive for hearing loss.    Respiratory:  Negative for chest tightness and shortness of breath.    Cardiovascular:  Negative for chest pain and leg swelling.   Gastrointestinal:  Negative for abdominal distention, abdominal pain, constipation, diarrhea, nausea and vomiting.   Musculoskeletal:  Positive for arthralgias. Negative for gait problem.   Skin:  Negative for rash and wound.     Objective:     Vital Signs (Most Recent):  Temp: 98.1 °F (36.7 °C) (02/02/25 0722)  Pulse: 96 (02/02/25 0722)  Resp: 18 (02/02/25 0846)  BP: (!) 96/59 (02/02/25 0722)  SpO2: 97 % (02/02/25 0722) Vital Signs (24h Range):  Temp:  [97.5 °F (36.4 °C)-98.1 °F (36.7 °C)] 98.1 °F (36.7 °C)  Pulse:  [] 96  Resp:  [18-20] 18  SpO2:  [94 %-97 %] 97 %  BP: ()/(59-69) 96/59     Weight: 66.1 kg (145 lb 11.6 oz)  Body mass index is 24.25 kg/m².    Intake/Output Summary (Last 24 hours) at 2/2/2025 1132  Last data filed at 2/2/2025 0800  Gross per 24 hour   Intake 720 ml   Output --   Net 720 ml         Physical Exam  Vitals reviewed. Exam conducted with a chaperone present.   Constitutional:       General: She is not in acute distress.     Appearance: Normal appearance. She is not ill-appearing.   HENT:      Head: Normocephalic and atraumatic.      Nose: Nose normal.   Eyes:      Conjunctiva/sclera: Conjunctivae normal.   Cardiovascular:      Rate and Rhythm: Normal rate and regular rhythm.      Pulses: Normal pulses.      Heart sounds: No murmur heard.  Pulmonary:      Effort: Pulmonary effort is normal.      Breath sounds: Normal breath sounds. No wheezing, rhonchi or rales.   Abdominal:      General: Bowel sounds are normal. There is no distension.      Palpations: Abdomen is  soft.      Tenderness: There is no abdominal tenderness. There is no guarding.   Musculoskeletal:         General: Deformity and signs of injury present. No swelling.      Cervical back: Normal range of motion and neck supple.      Right lower leg: No edema.      Left lower leg: No edema.      Comments: Right arm in a sling. Decreased rom and pain.   Skin:     General: Skin is warm and dry.      Findings: No rash.   Neurological:      Mental Status: She is alert and oriented to person, place, and time. Mental status is at baseline.      Gait: Gait abnormal.   Psychiatric:         Mood and Affect: Mood normal.         Thought Content: Thought content normal.         Judgment: Judgment normal.             Significant Labs: All pertinent labs within the past 24 hours have been reviewed.  Recent Lab Results         02/02/25  0600   02/02/25  0521   02/01/25 2023 02/01/25  1634        Albumin/Globulin Ratio   0.9           Albumin   3.0           ALP   78           ALT   13           Anion Gap   9.0           AST   18           Baso #   0.07           Basophil %   0.9           BILIRUBIN TOTAL   0.4           BUN   33.0           BUN/CREAT RATIO   42           Calcium   10.1           Chloride   101           CO2   32           Creatinine   0.78           eGFR   >60  Comment: Estimated GFR calculated using the CKD-EPI creatinine (2021) equation.           Eos #   0.29           Eos %   3.6           Globulin, Total   3.5           Glucose   123           Hematocrit   29.6           Hemoglobin   8.9           Immature Grans (Abs)   0.07           Immature Granulocytes   0.9           Lymph #   2.91           LYMPH %   35.8           MCH   25.0           MCHC   30.1           MCV   83.1           Mono #   0.65           Mono %   8.0           MPV   9.1           Neut #   4.13           Neut %   50.8           nRBC   0.0           Platelet Count   317           POCT Glucose 118     252   136       Potassium   4.2            PROTEIN TOTAL   6.5           RBC   3.56           RDW   19.6           Sodium   142           WBC   8.12                   Significant Imaging: I have reviewed all pertinent imaging results/findings within the past 24 hours.    Assessment and Plan     * Physical deconditioning  Admit to swing bed  OOB  PT/OT  Resume transfer meds  DVT prophylaxis  Follow labs      Type 2 diabetes mellitus, with long-term current use of insulin  Resume lantus and aspart 5 units tid.  1/29/25: Increase lantus to 20units. CBGs >400. Continue to monitor.  1/30/25: Increase lantus to 22units.  this morning. Continue to monitor.  1/31/25: Pt states that she also takes metformin and glimepiride at home. These meds were added to her home med list and I am restarting them. I will decrease glargine back to home dose for tonight and re-evaluate in the morning. moderate sliding scale in place of scheduled aspart, as pt was requiring over her 5 units. Will monitor, may need to change to low.    Closed fracture of proximal end of right humerus  PT/OT  Recs as per ortho      Moderate malnutrition  Nutrition consulted. Most recent weight and BMI monitored-     Measurements:  Wt Readings from Last 1 Encounters:   02/02/25 66.1 kg (145 lb 11.6 oz)   Body mass index is 24.25 kg/m².    Patient has been screened and assessed by [facility].    Malnutrition Type:  Context: acute illness or injury  Level: moderate    Malnutrition Characteristic Summary:  Weight Loss (Malnutrition): other (see comments) (Does not meet criteria)  Energy Intake (Malnutrition): less than or equal to 50% for greater than or equal to 5 days  Subcutaneous Fat (Malnutrition): mild depletion  Muscle Mass (Malnutrition): mild depletion  Fluid Accumulation (Malnutrition): other (see comments) (Not present)  Hand  Strength, Right (Malnutrition): Measurably reduced for age/gender    Interventions/Recommendations (treatment strategy):  Oral nutritional supplement;Care  coordination or referral;Feeding assistance/management;Oral diet/nutrient modifications        VTE Risk Mitigation (From admission, onward)           Ordered     enoxaparin injection 40 mg  Daily         01/27/25 5662                    Discharge Planning   ISATU:      Code Status: Full Code   Medical Readiness for Discharge Date:   Discharge Plan A: Home with family, Home Health   Discharge Delays: None known at this time            Please place Justification for DME        FATMATA ARANDA DO  Department of Hospital Medicine   [facility]

## 2025-02-02 NOTE — ASSESSMENT & PLAN NOTE
Nutrition consulted. Most recent weight and BMI monitored-     Measurements:  Wt Readings from Last 1 Encounters:   02/02/25 66.1 kg (145 lb 11.6 oz)   Body mass index is 24.25 kg/m².    Patient has been screened and assessed by RD.    Malnutrition Type:  Context: acute illness or injury  Level: moderate    Malnutrition Characteristic Summary:  Weight Loss (Malnutrition): other (see comments) (Does not meet criteria)  Energy Intake (Malnutrition): less than or equal to 50% for greater than or equal to 5 days  Subcutaneous Fat (Malnutrition): mild depletion  Muscle Mass (Malnutrition): mild depletion  Fluid Accumulation (Malnutrition): other (see comments) (Not present)  Hand  Strength, Right (Malnutrition): Measurably reduced for age/gender    Interventions/Recommendations (treatment strategy):  Oral nutritional supplement;Care coordination or referral;Feeding assistance/management;Oral diet/nutrient modifications

## 2025-02-02 NOTE — SUBJECTIVE & OBJECTIVE
Review of Systems   Constitutional:  Positive for activity change and fatigue. Negative for appetite change and fever.   HENT:  Positive for hearing loss.    Respiratory:  Negative for chest tightness and shortness of breath.    Cardiovascular:  Negative for chest pain and leg swelling.   Gastrointestinal:  Negative for abdominal distention, abdominal pain, constipation, diarrhea, nausea and vomiting.   Musculoskeletal:  Positive for arthralgias. Negative for gait problem.   Skin:  Negative for rash and wound.     Objective:     Vital Signs (Most Recent):  Temp: 98.1 °F (36.7 °C) (02/02/25 0722)  Pulse: 96 (02/02/25 0722)  Resp: 18 (02/02/25 0846)  BP: (!) 96/59 (02/02/25 0722)  SpO2: 97 % (02/02/25 0722) Vital Signs (24h Range):  Temp:  [97.5 °F (36.4 °C)-98.1 °F (36.7 °C)] 98.1 °F (36.7 °C)  Pulse:  [] 96  Resp:  [18-20] 18  SpO2:  [94 %-97 %] 97 %  BP: ()/(59-69) 96/59     Weight: 66.1 kg (145 lb 11.6 oz)  Body mass index is 24.25 kg/m².    Intake/Output Summary (Last 24 hours) at 2/2/2025 1132  Last data filed at 2/2/2025 0800  Gross per 24 hour   Intake 720 ml   Output --   Net 720 ml         Physical Exam  Vitals reviewed. Exam conducted with a chaperone present.   Constitutional:       General: She is not in acute distress.     Appearance: Normal appearance. She is not ill-appearing.   HENT:      Head: Normocephalic and atraumatic.      Nose: Nose normal.   Eyes:      Conjunctiva/sclera: Conjunctivae normal.   Cardiovascular:      Rate and Rhythm: Normal rate and regular rhythm.      Pulses: Normal pulses.      Heart sounds: No murmur heard.  Pulmonary:      Effort: Pulmonary effort is normal.      Breath sounds: Normal breath sounds. No wheezing, rhonchi or rales.   Abdominal:      General: Bowel sounds are normal. There is no distension.      Palpations: Abdomen is soft.      Tenderness: There is no abdominal tenderness. There is no guarding.   Musculoskeletal:         General: Deformity and  signs of injury present. No swelling.      Cervical back: Normal range of motion and neck supple.      Right lower leg: No edema.      Left lower leg: No edema.      Comments: Right arm in a sling. Decreased rom and pain.   Skin:     General: Skin is warm and dry.      Findings: No rash.   Neurological:      Mental Status: She is alert and oriented to person, place, and time. Mental status is at baseline.      Gait: Gait abnormal.   Psychiatric:         Mood and Affect: Mood normal.         Thought Content: Thought content normal.         Judgment: Judgment normal.             Significant Labs: All pertinent labs within the past 24 hours have been reviewed.  Recent Lab Results         02/02/25  0600   02/02/25  0521   02/01/25 2023 02/01/25  1634        Albumin/Globulin Ratio   0.9           Albumin   3.0           ALP   78           ALT   13           Anion Gap   9.0           AST   18           Baso #   0.07           Basophil %   0.9           BILIRUBIN TOTAL   0.4           BUN   33.0           BUN/CREAT RATIO   42           Calcium   10.1           Chloride   101           CO2   32           Creatinine   0.78           eGFR   >60  Comment: Estimated GFR calculated using the CKD-EPI creatinine (2021) equation.           Eos #   0.29           Eos %   3.6           Globulin, Total   3.5           Glucose   123           Hematocrit   29.6           Hemoglobin   8.9           Immature Grans (Abs)   0.07           Immature Granulocytes   0.9           Lymph #   2.91           LYMPH %   35.8           MCH   25.0           MCHC   30.1           MCV   83.1           Mono #   0.65           Mono %   8.0           MPV   9.1           Neut #   4.13           Neut %   50.8           nRBC   0.0           Platelet Count   317           POCT Glucose 118     252   136       Potassium   4.2           PROTEIN TOTAL   6.5           RBC   3.56           RDW   19.6           Sodium   142           WBC   8.12                    Significant Imaging: I have reviewed all pertinent imaging results/findings within the past 24 hours.

## 2025-02-03 PROBLEM — K21.9 GERD (GASTROESOPHAGEAL REFLUX DISEASE): Status: ACTIVE | Noted: 2025-02-03

## 2025-02-03 LAB
ALBUMIN SERPL-MCNC: 3.1 G/DL (ref 3.4–4.8)
ALBUMIN/GLOB SERPL: 0.8 RATIO (ref 1.1–2)
ALP SERPL-CCNC: 80 UNIT/L (ref 40–150)
ALT SERPL-CCNC: 14 UNIT/L (ref 0–55)
ANION GAP SERPL CALC-SCNC: 10 MEQ/L
AST SERPL-CCNC: 18 UNIT/L (ref 5–34)
BASOPHILS # BLD AUTO: 0.07 X10(3)/MCL
BASOPHILS NFR BLD AUTO: 0.8 %
BILIRUB SERPL-MCNC: 0.5 MG/DL
BUN SERPL-MCNC: 29 MG/DL (ref 9.8–20.1)
CALCIUM SERPL-MCNC: 10 MG/DL (ref 8.4–10.2)
CHLORIDE SERPL-SCNC: 102 MMOL/L (ref 98–107)
CO2 SERPL-SCNC: 29 MMOL/L (ref 23–31)
CREAT SERPL-MCNC: 0.78 MG/DL (ref 0.55–1.02)
CREAT/UREA NIT SERPL: 37
EOSINOPHIL # BLD AUTO: 0.3 X10(3)/MCL (ref 0–0.9)
EOSINOPHIL NFR BLD AUTO: 3.5 %
ERYTHROCYTE [DISTWIDTH] IN BLOOD BY AUTOMATED COUNT: 20.3 % (ref 11.5–17)
GFR SERPLBLD CREATININE-BSD FMLA CKD-EPI: >60 ML/MIN/1.73/M2
GLOBULIN SER-MCNC: 3.7 GM/DL (ref 2.4–3.5)
GLUCOSE SERPL-MCNC: 88 MG/DL (ref 82–115)
HCT VFR BLD AUTO: 30.4 % (ref 37–47)
HGB BLD-MCNC: 9.2 G/DL (ref 12–16)
IMM GRANULOCYTES # BLD AUTO: 0.09 X10(3)/MCL (ref 0–0.04)
IMM GRANULOCYTES NFR BLD AUTO: 1 %
LYMPHOCYTES # BLD AUTO: 2.97 X10(3)/MCL (ref 0.6–4.6)
LYMPHOCYTES NFR BLD AUTO: 34.5 %
MCH RBC QN AUTO: 25.1 PG (ref 27–31)
MCHC RBC AUTO-ENTMCNC: 30.3 G/DL (ref 33–36)
MCV RBC AUTO: 83.1 FL (ref 80–94)
MONOCYTES # BLD AUTO: 0.61 X10(3)/MCL (ref 0.1–1.3)
MONOCYTES NFR BLD AUTO: 7.1 %
NEUTROPHILS # BLD AUTO: 4.56 X10(3)/MCL (ref 2.1–9.2)
NEUTROPHILS NFR BLD AUTO: 53.1 %
NRBC BLD AUTO-RTO: 0 %
PLATELET # BLD AUTO: 364 X10(3)/MCL (ref 130–400)
PMV BLD AUTO: 9.3 FL (ref 7.4–10.4)
POCT GLUCOSE: 191 MG/DL (ref 70–110)
POCT GLUCOSE: 249 MG/DL (ref 70–110)
POCT GLUCOSE: 90 MG/DL (ref 70–110)
POCT GLUCOSE: 98 MG/DL (ref 70–110)
POTASSIUM SERPL-SCNC: 4.1 MMOL/L (ref 3.5–5.1)
PROT SERPL-MCNC: 6.8 GM/DL (ref 5.8–7.6)
RBC # BLD AUTO: 3.66 X10(6)/MCL (ref 4.2–5.4)
SODIUM SERPL-SCNC: 141 MMOL/L (ref 136–145)
WBC # BLD AUTO: 8.6 X10(3)/MCL (ref 4.5–11.5)

## 2025-02-03 PROCEDURE — 97530 THERAPEUTIC ACTIVITIES: CPT

## 2025-02-03 PROCEDURE — 25000003 PHARM REV CODE 250: Performed by: INTERNAL MEDICINE

## 2025-02-03 PROCEDURE — 63600175 PHARM REV CODE 636 W HCPCS: Performed by: INTERNAL MEDICINE

## 2025-02-03 PROCEDURE — 36415 COLL VENOUS BLD VENIPUNCTURE: CPT | Performed by: INTERNAL MEDICINE

## 2025-02-03 PROCEDURE — 85025 COMPLETE CBC W/AUTO DIFF WBC: CPT | Performed by: INTERNAL MEDICINE

## 2025-02-03 PROCEDURE — 80053 COMPREHEN METABOLIC PANEL: CPT | Performed by: INTERNAL MEDICINE

## 2025-02-03 PROCEDURE — 11000004 HC SNF PRIVATE

## 2025-02-03 PROCEDURE — 97129 THER IVNTJ 1ST 15 MIN: CPT

## 2025-02-03 PROCEDURE — 97130 THER IVNTJ EA ADDL 15 MIN: CPT

## 2025-02-03 RX ORDER — ONDANSETRON HYDROCHLORIDE 2 MG/ML
4 INJECTION, SOLUTION INTRAVENOUS ONCE
Status: COMPLETED | OUTPATIENT
Start: 2025-02-03 | End: 2025-02-03

## 2025-02-03 RX ADMIN — ALUMINUM HYDROXIDE, MAGNESIUM HYDROXIDE, AND DIMETHICONE 30 ML: 200; 20; 200 SUSPENSION ORAL at 09:02

## 2025-02-03 RX ADMIN — INSULIN ASPART 4 UNITS: 100 INJECTION, SOLUTION INTRAVENOUS; SUBCUTANEOUS at 10:02

## 2025-02-03 RX ADMIN — LEVOTHYROXINE SODIUM 100 MCG: 0.1 TABLET ORAL at 06:02

## 2025-02-03 RX ADMIN — MEMANTINE 10 MG: 5 TABLET ORAL at 08:02

## 2025-02-03 RX ADMIN — HYDROCODONE BITARTRATE AND ACETAMINOPHEN 1 TABLET: 5; 325 TABLET ORAL at 08:02

## 2025-02-03 RX ADMIN — ONDANSETRON 4 MG: 2 INJECTION INTRAMUSCULAR; INTRAVENOUS at 01:02

## 2025-02-03 RX ADMIN — ALUMINUM HYDROXIDE, MAGNESIUM HYDROXIDE, AND DIMETHICONE 30 ML: 200; 20; 200 SUSPENSION ORAL at 06:02

## 2025-02-03 RX ADMIN — METFORMIN HYDROCHLORIDE 500 MG: 500 TABLET ORAL at 04:02

## 2025-02-03 RX ADMIN — METFORMIN HYDROCHLORIDE 500 MG: 500 TABLET ORAL at 07:02

## 2025-02-03 RX ADMIN — GLIMEPIRIDE 4 MG: 2 TABLET ORAL at 06:02

## 2025-02-03 RX ADMIN — LISINOPRIL 5 MG: 5 TABLET ORAL at 08:02

## 2025-02-03 RX ADMIN — ATORVASTATIN CALCIUM 20 MG: 10 TABLET, FILM COATED ORAL at 08:02

## 2025-02-03 RX ADMIN — ALUMINUM HYDROXIDE, MAGNESIUM HYDROXIDE, AND DIMETHICONE 30 ML: 200; 20; 200 SUSPENSION ORAL at 04:02

## 2025-02-03 RX ADMIN — ONDANSETRON 4 MG: 4 TABLET, ORALLY DISINTEGRATING ORAL at 09:02

## 2025-02-03 RX ADMIN — INSULIN GLARGINE 20 UNITS: 100 INJECTION, SOLUTION SUBCUTANEOUS at 09:02

## 2025-02-03 RX ADMIN — ENOXAPARIN SODIUM 40 MG: 40 INJECTION SUBCUTANEOUS at 04:02

## 2025-02-03 RX ADMIN — MEMANTINE 10 MG: 5 TABLET ORAL at 09:02

## 2025-02-03 RX ADMIN — MICONAZOLE NITRATE: 20 POWDER TOPICAL at 08:02

## 2025-02-03 NOTE — PT/OT/SLP PROGRESS
"Speech Language Pathology Treatment    Patient Name:  Brittany Nunn   MRN:  [id number]  Admitting Diagnosis: Physical deconditioning    Recommendations:                      General Recommendations:  Cognitive-linguistic therapy/ dementia education/training  Diet recommendations:  Easy to Chew Diet - IDDSI Level 7, Thin liquids - IDDSI Level 0   Aspiration Precautions:  n/a    General Precautions: Standard, fall (confusion / dementia)  Communication strategies:   reduce distractions, ensure Pt's hearing aides are in and on, allow Pt to see speakers mouth    Assessment:     Brittany Nunn is a 89 y.o. 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  She presents with reduced awareness and insight for adequate problem solving/reasoning, reduced receptive and expressive language skills at mod-complex levels. Reduced recall and confusion in conversation noted. Difficulty planning for return home and trouble shooting new status (limited arm use and possible medication changes).    Subjective     Pt participated well throughout. Reduced insight to confusion in conversation, easily redirected/cued.     Patient goals: "get home"      Pain/Comfort:   No mild pain reported in hip, Pt given rest breaks as needed    Respiratory Status: Room air    Objective:     Pt completed medication management task with 55% acc. Difficulty with details of prescription information and changing from past medication routines. Pt easily redirected and corrected with cues.   Pt problems solved for situations at home (ADLs, medication management, etc.) considering new deficits with mod cues required. Inconsistent reporting of husbands ability with tasks, Pt unable to clarify consistently.     Has the patient been evaluated by SLP for swallowing?   No  Keep patient NPO? No     Goals:   Multidisciplinary Problems       SLP Goals          Problem: SLP    Goal Priority " Disciplines Outcome   SLP Goal     SLP Progressing   Description: LTGs:  Pt will demonstrate improved recall and use of strategies for improved communication, safety, and participation in ADLs.    STGs:  Pt/family will participate in dementia education/training and complete teach-back for improved communication and strategy use to increase safety and participation in ADLs.   Pt will demonstrate recall and use of strategies with min cues for improved communication, safety, and participation in ADLs.                        Plan:   Con't POC.   Patient to be seen:   (3-5x/week)   Plan of Care expires:     Plan of Care reviewed with:  patient, daughter   SLP Follow-Up:  Yes       Discharge recommendations:    home with  care or nursing home  Barriers to Discharge:  Level of Skilled Assistance Needed   and Safety Awareness      Time Tracking:     SLP Treatment Date:   02/03/25  Speech Start Time:  1020  Speech Stop Time:  1045     Speech Total Time (min):  25 min    Billable Minutes: cog 25min / 2 units    Bebe Pappas MA, CCC-SLP  02/03/2025

## 2025-02-03 NOTE — PT/OT/SLP PROGRESS
Occupational Therapy  Treatment    Name: Brittany Nunn    : 1935 (89 y.o.)  MRN: [id number]          TREATMENT SUMMARY AND RECOMMENDATIONS:      Subjective Assessment:   No complaints  Lethargic   x Awake, alert, cooperative  Uncooperative    Agitated x Flat affect    Appropriate  c/o fatigue   x Confused x Treated at bedside     Emotionally liable  Treated in gym/dept.    Impulsive x Other: Pt reported stomach upset secondary to shoulder pain.       Pain/Comfort:  Pain Rating 1: 610  Location - Side 1: Right  Location 1: shoulder  Pain Addressed 1: Reposition, Distraction, Other (see comments) (Therapeutic massage with topical analgesic)  Pain Rating Post-Intervention 1: 4/10      Therapy Precautions:   x Cognitive deficits  Spinal precautions    Collar - hard  Sternal precautions    Collar - soft   TLSO   x Fall risk  LSO    Hip precautions - posterior  Knee immobilizer    Hip precautions - anterior  WBAT    Impaired communication  Partial weightbearing    Oxygen  TTWB    PEG tube RUE NWB    Visual deficits x Other:RUE sling.   x Hearing deficits           Vitals Value   x Room air      Oxygen (L)     Blood pressure     Heart rate         Treatment Objectives:     Mobility Training:    Mobility task Assist level Comments    Bed mobility Min Transfer training sit to supine min assist with BLEs then min assist with upper trunk semi-supine to sit.  Pt reported dizziness with transfer to sit that receded after sitting 2 mins.    Balance training     Transfer Min Transfer training recliner <> bed min assist with SBQC and verbal cuing for sequencing/safety.   Functional mobility     Sit to stand transitions     Other: Max Changed hospital gown secondary to being soiled with emesis.       Additional Treatment:  While in supine removed RUE sling.  Performed therapeutic massage with topical analgesic to right shoulder, scapular region and upper arm to elbow.  Performed gentle trigger point releasing to upper  traps and scapular musculature.  Performed right elbow PROM flex/ext 1x10 reps in comfort range.  Performed AROM 1x10 reps forearm sup/pron then wrist flex/ext.  Pt required verbal cuing for proper technique.  While in supine applied antifungal powder to axillary region prior to sling application.    Assessment: Patient tolerated session poorly.  Pt not having a good morning secondary to increased right shoulder pain with nausea.  RN had given pain meds prior to session.  Pt did report a decrease in symptoms after intervention.  Noted she required more assistance and verbal cuing with functional transfers today.    OT Plan: OT to focus next session on transfer training, ADL training, pain management, and RUE ROM ex's.      GOALS:   Multidisciplinary Problems       Occupational Therapy Goals          Problem: Occupational Therapy    Goal Priority Disciplines Outcome Interventions   Occupational Therapy Goal     OT, PT/OT Progressing    Description:   Patient will increase functional independence with ADLs by performing:    LE Dressing with Minimal Assistance.  Toileting from bedside commode with Contact Guard Assistance for hygiene and clothing management.   Bathing from  shower chair/bench with Minimal Assistance.  Toilet transfer to bedside commode with Contact Guard Assistance.                         Communication with Treatment Team:     Discharge Recommendations:    Discharge Equipment Recommendations:   (TBD)  Barriers to discharge:  Decreased caregiver support      At end of treatment, patient remained:  x Up in chair     In bed   x With alarm activated    Bed rails up   x Call bell in reach     Family/friends present    Restraints secured properly    In bathroom with CNA/RN notified    In gym with PT/PTA/tech   x Nurse aware    Other:         OT Date of Treatment: 02/03/25  OT Start Time: 0910  OT Stop Time: 0940  OT Total Time (min): 30 min    Billable Minutes:Therapeutic Activity 30      2/3/2025

## 2025-02-03 NOTE — PROGRESS NOTES
[facility]  [facility] Medicine  Progress Note    Patient Name: Brittany Nunn  MRN: [id number]  Patient Class: IP- Swing   Admission Date: 1/27/2025  Length of Stay: 7 days  Attending Physician: Radha Rapp DO  Primary Care Provider: Etser, Primary Doctor        Subjective     Principal Problem:Physical deconditioning        HPI:  89 yo female admitted to swing bed today for continued therapy.  She is s/p a fall at home in which she tripped and fell on her right shoulder.  No LOC. She was sent to [facility] where she was found to have an impacted humeral head/neck fracture with some mild subluxation.  This was determined to be non-surgical.  She does c/o pain to her shoulder otherwise she stats she is calm.  Currently no N/V/D/C.  No fever/chills.  NO chest pian/SOB.  She will be admitted for continued PT/OT services and pain control.    Overview/Hospital Course:  1/28/25: Pt was in the process of changing rooms when I saw her this morning. She was nauseous due to her right arm pain. She had vomited a small amount of bile. She denied any cp or sob. I spoke to her daughter this afternoon. She lives in [place]. There is a brother that has passed away, and another brother that lives in [place]. She states that they have been noticing a decline of their parents over the last year and are preparing for the fact that their living situation likely needs to change. She is available by phone if we have any questions. She states that the pt was using a walker, possibly daily, for ambulation. They were eating microwavable foods, mostly. For groceries they do grocery . Also states that since her TIA, the pt has had some urinary incontinence, requiring the use of some type of padding/diaper.    1/29/25: PT sitting up in her recliner during multidisciplinary rounds meeting. Daughter was on the phone. OT/ST is recommending 24 hour care for safety and ADLs. Daughter understands and will start  "working on this. Pt denies sob, cp, abd pain, constipation. Last bm yesterday. Normally has a bm oqd. She is only complaining of right arm pain.     1/30/25: Pt sitting up in her recliner. She asked me to open her diet coke. She said she cannot use her right hand at all to help her open it. Also said that her left hand isn't strong enough to pop open the tab. I pointed out that even these simple tasks are difficult for her now. She spoke to her daughter on the phone earlier this morning. I asked if they had started discussing a plan for care once she is discharged. She said she plans to stay here until her insurance "runs out." I asked what the plan was when she goes home. She said that they were looking at finding a family member that could come in for breakfast and then again around lunch. I reminded her that we are recommending 24 hour care. She said they did not discuss that. I recommended they do so. Reminded her of the options her daughter had mentioned yesterday of either a NH or for the pt to go and live with on the children. Pt said that "maybe someone could drive me to [place] to stay with my daughter-in-law, but she has 2 teenagers." I encouraged her to discuss this further with her daughter. No complaints at this time.    1/31/25: Pt sitting up in her recliner. No complaints this morning. CBG is 450s. Scheduled 5 units given. Post prandial, remains >400. Pt had pancakes for breakfast with syrup on the diabetic diet.  Pt informed the nurse that at home she takes metformin and glimepiride. These were not listed on her home meds upon admission. I will restart these.     2/1/25: Pt sitting up in her recliner. States that her  is coming to visit her today. She is happy about this. Her grand daughter is bringing him. She is having bm's more than usual. I will change the docusate to prn. No other complaints.    2/2/25: Pt lying in bed. She had a nice visit with her  yesterday. He is in a wheel " chair. She laughed and said he asked if he could stay the night with her in the hospital. She has no complaints this morning.     2/3/25: Pt was sitting up in her recliner this morning. She was vomiting bile earlier. She states it is due to her GERD and that this happens at home. Pt's insurance review is today. We are expecting pt will be ready for dc by the end of the week. CM is communicating with family and encouraging a long term plan for 24 hr care. Awaiting their decision.    Review of Systems   Constitutional:  Positive for activity change and fatigue. Negative for appetite change and fever.   HENT:  Positive for hearing loss.    Respiratory:  Negative for chest tightness and shortness of breath.    Cardiovascular:  Negative for chest pain and leg swelling.   Gastrointestinal:  Negative for abdominal distention, abdominal pain, constipation, diarrhea, nausea and vomiting.   Musculoskeletal:  Positive for arthralgias. Negative for gait problem.   Skin:  Negative for rash and wound.     Objective:     Vital Signs (Most Recent):  Temp: 97.6 °F (36.4 °C) (02/03/25 0703)  Pulse: 98 (02/03/25 0703)  Resp: 18 (02/03/25 0843)  BP: 104/64 (02/03/25 0703)  SpO2: (!) 94 % (02/03/25 0703) Vital Signs (24h Range):  Temp:  [97.6 °F (36.4 °C)-98.9 °F (37.2 °C)] 97.6 °F (36.4 °C)  Pulse:  [] 98  Resp:  [18] 18  SpO2:  [94 %-97 %] 94 %  BP: (104-107)/(54-78) 104/64     Weight: 66.1 kg (145 lb 11.6 oz)  Body mass index is 24.25 kg/m².    Intake/Output Summary (Last 24 hours) at 2/3/2025 1207  Last data filed at 2/2/2025 1729  Gross per 24 hour   Intake 240 ml   Output --   Net 240 ml         Physical Exam  Vitals reviewed. Exam conducted with a chaperone present.   Constitutional:       General: She is not in acute distress.     Appearance: Normal appearance. She is not ill-appearing.   HENT:      Head: Normocephalic and atraumatic.      Nose: Nose normal.   Eyes:      Conjunctiva/sclera: Conjunctivae normal.    Cardiovascular:      Rate and Rhythm: Normal rate and regular rhythm.      Pulses: Normal pulses.      Heart sounds: No murmur heard.  Pulmonary:      Effort: Pulmonary effort is normal.      Breath sounds: Normal breath sounds. No wheezing, rhonchi or rales.   Abdominal:      General: Bowel sounds are normal. There is no distension.      Palpations: Abdomen is soft.      Tenderness: There is no abdominal tenderness. There is no guarding.   Musculoskeletal:         General: Deformity and signs of injury present. No swelling.      Cervical back: Normal range of motion and neck supple.      Right lower leg: No edema.      Left lower leg: No edema.      Comments: Right arm in a sling. Decreased rom and pain.   Skin:     General: Skin is warm and dry.      Findings: No rash.   Neurological:      Mental Status: She is alert and oriented to person, place, and time. Mental status is at baseline.      Gait: Gait abnormal.   Psychiatric:         Mood and Affect: Mood normal.         Thought Content: Thought content normal.         Judgment: Judgment normal.             Significant Labs: All pertinent labs within the past 24 hours have been reviewed.  Recent Lab Results  (Last 5 results in the past 24 hours)        02/03/25  1024   02/03/25  0614   02/03/25  0457   02/02/25 2002 02/02/25  1630        Albumin/Globulin Ratio     0.8           Albumin     3.1           ALP     80           ALT     14           Anion Gap     10.0           AST     18           Baso #     0.07           Basophil %     0.8           BILIRUBIN TOTAL     0.5           BUN     29.0           BUN/CREAT RATIO     37           Calcium     10.0           Chloride     102           CO2     29           Creatinine     0.78           eGFR     >60  Comment: Estimated GFR calculated using the CKD-EPI creatinine (2021) equation.           Eos #     0.30           Eos %     3.5           Globulin, Total     3.7           Glucose     88           Hematocrit      30.4           Hemoglobin     9.2           Immature Grans (Abs)     0.09           Immature Granulocytes     1.0           Lymph #     2.97           LYMPH %     34.5           MCH     25.1           MCHC     30.3           MCV     83.1           Mono #     0.61           Mono %     7.1           MPV     9.3           Neut #     4.56           Neut %     53.1           nRBC     0.0           Platelet Count     364           POCT Glucose 249   98     167   115       Potassium     4.1           PROTEIN TOTAL     6.8           RBC     3.66           RDW     20.3           Sodium     141           WBC     8.60                                  Significant Imaging: I have reviewed all pertinent imaging results/findings within the past 24 hours.    Assessment and Plan     * Physical deconditioning  Admit to swing bed  OOB  PT/OT  Resume transfer meds  DVT prophylaxis  Follow labs      GERD (gastroesophageal reflux disease)  Pt has had multiple episodes of vomiting bile in the mornings. She should have further outpt workup with her PCP/GI to address this.       Type 2 diabetes mellitus, with long-term current use of insulin  Resume lantus and aspart 5 units tid.  1/29/25: Increase lantus to 20units. CBGs >400. Continue to monitor.  1/30/25: Increase lantus to 22units.  this morning. Continue to monitor.  1/31/25: Pt states that she also takes metformin and glimepiride at home. These meds were added to her home med list and I am restarting them. I will decrease glargine back to home dose for tonight and re-evaluate in the morning. moderate sliding scale in place of scheduled aspart, as pt was requiring over her 5 units. Will monitor, may need to change to low.    Closed fracture of proximal end of right humerus  PT/OT  Recs as per ortho      Moderate malnutrition  Nutrition consulted. Most recent weight and BMI monitored-     Measurements:  Wt Readings from Last 1 Encounters:   02/02/25 66.1 kg (145 lb 11.6 oz)    Body mass index is 24.25 kg/m².    Patient has been screened and assessed by RD.    Malnutrition Type:  Context: acute illness or injury  Level: moderate    Malnutrition Characteristic Summary:  Weight Loss (Malnutrition): other (see comments) (Does not meet criteria)  Energy Intake (Malnutrition): less than or equal to 50% for greater than or equal to 5 days  Subcutaneous Fat (Malnutrition): mild depletion  Muscle Mass (Malnutrition): mild depletion  Fluid Accumulation (Malnutrition): other (see comments) (Not present)  Hand  Strength, Right (Malnutrition): Measurably reduced for age/gender    Interventions/Recommendations (treatment strategy):  Oral nutritional supplement;Care coordination or referral;Feeding assistance/management;Oral diet/nutrient modifications        VTE Risk Mitigation (From admission, onward)           Ordered     enoxaparin injection 40 mg  Daily         01/27/25 2091                    Discharge Planning   ISATU:      Code Status: Full Code   Medical Readiness for Discharge Date:   Discharge Plan A: Home with family, Home Health   Discharge Delays: None known at this time            Please place Justification for DME        FATMATA ARANDA DO  Department of Hospital Medicine   [facility]

## 2025-02-03 NOTE — PLAN OF CARE
Problem: Adult Inpatient Plan of Care  Goal: Plan of Care Review  Outcome: Progressing  Goal: Patient-Specific Goal (Individualized)  Outcome: Progressing  Goal: Absence of Hospital-Acquired Illness or Injury  Outcome: Progressing  Goal: Optimal Comfort and Wellbeing  Outcome: Progressing  Goal: Readiness for Transition of Care  Outcome: Progressing     Problem: Diabetes Comorbidity  Goal: Blood Glucose Level Within Targeted Range  Outcome: Progressing     Problem: Skin Injury Risk Increased  Goal: Skin Health and Integrity  Outcome: Progressing     Problem: Wound  Goal: Optimal Coping  Outcome: Progressing  Goal: Optimal Functional Ability  Outcome: Progressing  Goal: Absence of Infection Signs and Symptoms  Outcome: Progressing  Goal: Improved Oral Intake  Outcome: Progressing  Goal: Optimal Pain Control and Function  Outcome: Progressing  Goal: Skin Health and Integrity  Outcome: Progressing  Goal: Optimal Wound Healing  Outcome: Progressing     Problem: Pain Acute  Goal: Optimal Pain Control and Function  Outcome: Progressing

## 2025-02-03 NOTE — NURSING
Edison with PT working with patient.  Patient developed Nausea with a couple episode of vomiting small amounts of bile.  Patient reports this as a symptom of her GERD.  PRN SL zofran administered.

## 2025-02-03 NOTE — ASSESSMENT & PLAN NOTE
Pt has had multiple episodes of vomiting bile in the mornings. She should have further outpt workup with her PCP/GI to address this.

## 2025-02-03 NOTE — PT/OT/SLP PROGRESS
"Name: Brittany Nunn    : 1935 (89 y.o.)  MRN: [id number]           Patient Not Seen      Attempted to see pt for PT treatment this AM.  Pt was seated in the recliner holding an emesis bag. Pt c/o feeling "weak" and began to vomit bile into the bag while speaking with PT.  Pt's nurse was notified and arrived to assess.  Will f/u with pt later today as appropriate.       "

## 2025-02-03 NOTE — SUBJECTIVE & OBJECTIVE
Review of Systems   Constitutional:  Positive for activity change and fatigue. Negative for appetite change and fever.   HENT:  Positive for hearing loss.    Respiratory:  Negative for chest tightness and shortness of breath.    Cardiovascular:  Negative for chest pain and leg swelling.   Gastrointestinal:  Negative for abdominal distention, abdominal pain, constipation, diarrhea, nausea and vomiting.   Musculoskeletal:  Positive for arthralgias. Negative for gait problem.   Skin:  Negative for rash and wound.     Objective:     Vital Signs (Most Recent):  Temp: 97.6 °F (36.4 °C) (02/03/25 0703)  Pulse: 98 (02/03/25 0703)  Resp: 18 (02/03/25 0843)  BP: 104/64 (02/03/25 0703)  SpO2: (!) 94 % (02/03/25 0703) Vital Signs (24h Range):  Temp:  [97.6 °F (36.4 °C)-98.9 °F (37.2 °C)] 97.6 °F (36.4 °C)  Pulse:  [] 98  Resp:  [18] 18  SpO2:  [94 %-97 %] 94 %  BP: (104-107)/(54-78) 104/64     Weight: 66.1 kg (145 lb 11.6 oz)  Body mass index is 24.25 kg/m².    Intake/Output Summary (Last 24 hours) at 2/3/2025 1207  Last data filed at 2/2/2025 1729  Gross per 24 hour   Intake 240 ml   Output --   Net 240 ml         Physical Exam  Vitals reviewed. Exam conducted with a chaperone present.   Constitutional:       General: She is not in acute distress.     Appearance: Normal appearance. She is not ill-appearing.   HENT:      Head: Normocephalic and atraumatic.      Nose: Nose normal.   Eyes:      Conjunctiva/sclera: Conjunctivae normal.   Cardiovascular:      Rate and Rhythm: Normal rate and regular rhythm.      Pulses: Normal pulses.      Heart sounds: No murmur heard.  Pulmonary:      Effort: Pulmonary effort is normal.      Breath sounds: Normal breath sounds. No wheezing, rhonchi or rales.   Abdominal:      General: Bowel sounds are normal. There is no distension.      Palpations: Abdomen is soft.      Tenderness: There is no abdominal tenderness. There is no guarding.   Musculoskeletal:         General: Deformity and  signs of injury present. No swelling.      Cervical back: Normal range of motion and neck supple.      Right lower leg: No edema.      Left lower leg: No edema.      Comments: Right arm in a sling. Decreased rom and pain.   Skin:     General: Skin is warm and dry.      Findings: No rash.   Neurological:      Mental Status: She is alert and oriented to person, place, and time. Mental status is at baseline.      Gait: Gait abnormal.   Psychiatric:         Mood and Affect: Mood normal.         Thought Content: Thought content normal.         Judgment: Judgment normal.             Significant Labs: All pertinent labs within the past 24 hours have been reviewed.  Recent Lab Results  (Last 5 results in the past 24 hours)        02/03/25  1024   02/03/25  0614   02/03/25  0457   02/02/25 2002 02/02/25  1630        Albumin/Globulin Ratio     0.8           Albumin     3.1           ALP     80           ALT     14           Anion Gap     10.0           AST     18           Baso #     0.07           Basophil %     0.8           BILIRUBIN TOTAL     0.5           BUN     29.0           BUN/CREAT RATIO     37           Calcium     10.0           Chloride     102           CO2     29           Creatinine     0.78           eGFR     >60  Comment: Estimated GFR calculated using the CKD-EPI creatinine (2021) equation.           Eos #     0.30           Eos %     3.5           Globulin, Total     3.7           Glucose     88           Hematocrit     30.4           Hemoglobin     9.2           Immature Grans (Abs)     0.09           Immature Granulocytes     1.0           Lymph #     2.97           LYMPH %     34.5           MCH     25.1           MCHC     30.3           MCV     83.1           Mono #     0.61           Mono %     7.1           MPV     9.3           Neut #     4.56           Neut %     53.1           nRBC     0.0           Platelet Count     364           POCT Glucose 249   98     167   115       Potassium     4.1            PROTEIN TOTAL     6.8           RBC     3.66           RDW     20.3           Sodium     141           WBC     8.60                                  Significant Imaging: I have reviewed all pertinent imaging results/findings within the past 24 hours.

## 2025-02-04 LAB
ALBUMIN SERPL-MCNC: 3.2 G/DL (ref 3.4–4.8)
ALBUMIN/GLOB SERPL: 0.9 RATIO (ref 1.1–2)
ALP SERPL-CCNC: 92 UNIT/L (ref 40–150)
ALT SERPL-CCNC: 11 UNIT/L (ref 0–55)
ANION GAP SERPL CALC-SCNC: 8 MEQ/L
AST SERPL-CCNC: 19 UNIT/L (ref 5–34)
BASOPHILS # BLD AUTO: 0.07 X10(3)/MCL
BASOPHILS NFR BLD AUTO: 0.8 %
BILIRUB SERPL-MCNC: 0.5 MG/DL
BUN SERPL-MCNC: 28 MG/DL (ref 9.8–20.1)
CALCIUM SERPL-MCNC: 10.2 MG/DL (ref 8.4–10.2)
CHLORIDE SERPL-SCNC: 101 MMOL/L (ref 98–107)
CO2 SERPL-SCNC: 33 MMOL/L (ref 23–31)
CREAT SERPL-MCNC: 0.89 MG/DL (ref 0.55–1.02)
CREAT/UREA NIT SERPL: 31
EOSINOPHIL # BLD AUTO: 0.26 X10(3)/MCL (ref 0–0.9)
EOSINOPHIL NFR BLD AUTO: 3.1 %
ERYTHROCYTE [DISTWIDTH] IN BLOOD BY AUTOMATED COUNT: 20.5 % (ref 11.5–17)
GFR SERPLBLD CREATININE-BSD FMLA CKD-EPI: >60 ML/MIN/1.73/M2
GLOBULIN SER-MCNC: 3.5 GM/DL (ref 2.4–3.5)
GLUCOSE SERPL-MCNC: 92 MG/DL (ref 82–115)
HCT VFR BLD AUTO: 30.9 % (ref 37–47)
HGB BLD-MCNC: 9.2 G/DL (ref 12–16)
IMM GRANULOCYTES # BLD AUTO: 0.06 X10(3)/MCL (ref 0–0.04)
IMM GRANULOCYTES NFR BLD AUTO: 0.7 %
LYMPHOCYTES # BLD AUTO: 3.06 X10(3)/MCL (ref 0.6–4.6)
LYMPHOCYTES NFR BLD AUTO: 36.4 %
MCH RBC QN AUTO: 24.9 PG (ref 27–31)
MCHC RBC AUTO-ENTMCNC: 29.8 G/DL (ref 33–36)
MCV RBC AUTO: 83.5 FL (ref 80–94)
MONOCYTES # BLD AUTO: 0.61 X10(3)/MCL (ref 0.1–1.3)
MONOCYTES NFR BLD AUTO: 7.3 %
NEUTROPHILS # BLD AUTO: 4.35 X10(3)/MCL (ref 2.1–9.2)
NEUTROPHILS NFR BLD AUTO: 51.7 %
NRBC BLD AUTO-RTO: 0 %
PLATELET # BLD AUTO: 326 X10(3)/MCL (ref 130–400)
PMV BLD AUTO: 8.7 FL (ref 7.4–10.4)
POCT GLUCOSE: 125 MG/DL (ref 70–110)
POCT GLUCOSE: 131 MG/DL (ref 70–110)
POCT GLUCOSE: 277 MG/DL (ref 70–110)
POCT GLUCOSE: 292 MG/DL (ref 70–110)
POCT GLUCOSE: 51 MG/DL (ref 70–110)
POTASSIUM SERPL-SCNC: 4.2 MMOL/L (ref 3.5–5.1)
PROT SERPL-MCNC: 6.7 GM/DL (ref 5.8–7.6)
RBC # BLD AUTO: 3.7 X10(6)/MCL (ref 4.2–5.4)
SODIUM SERPL-SCNC: 142 MMOL/L (ref 136–145)
WBC # BLD AUTO: 8.41 X10(3)/MCL (ref 4.5–11.5)

## 2025-02-04 PROCEDURE — 80053 COMPREHEN METABOLIC PANEL: CPT | Performed by: INTERNAL MEDICINE

## 2025-02-04 PROCEDURE — 11000004 HC SNF PRIVATE

## 2025-02-04 PROCEDURE — 97116 GAIT TRAINING THERAPY: CPT

## 2025-02-04 PROCEDURE — 97129 THER IVNTJ 1ST 15 MIN: CPT

## 2025-02-04 PROCEDURE — 63600175 PHARM REV CODE 636 W HCPCS: Performed by: INTERNAL MEDICINE

## 2025-02-04 PROCEDURE — 25000003 PHARM REV CODE 250: Performed by: INTERNAL MEDICINE

## 2025-02-04 PROCEDURE — 85025 COMPLETE CBC W/AUTO DIFF WBC: CPT | Performed by: INTERNAL MEDICINE

## 2025-02-04 PROCEDURE — 97530 THERAPEUTIC ACTIVITIES: CPT

## 2025-02-04 PROCEDURE — 97130 THER IVNTJ EA ADDL 15 MIN: CPT

## 2025-02-04 PROCEDURE — 36415 COLL VENOUS BLD VENIPUNCTURE: CPT | Performed by: INTERNAL MEDICINE

## 2025-02-04 PROCEDURE — 97535 SELF CARE MNGMENT TRAINING: CPT

## 2025-02-04 RX ADMIN — HYDROCODONE BITARTRATE AND ACETAMINOPHEN 1 TABLET: 5; 325 TABLET ORAL at 08:02

## 2025-02-04 RX ADMIN — MEMANTINE 10 MG: 5 TABLET ORAL at 10:02

## 2025-02-04 RX ADMIN — ALUMINUM HYDROXIDE, MAGNESIUM HYDROXIDE, AND DIMETHICONE 30 ML: 200; 20; 200 SUSPENSION ORAL at 12:02

## 2025-02-04 RX ADMIN — LISINOPRIL 5 MG: 5 TABLET ORAL at 10:02

## 2025-02-04 RX ADMIN — ENOXAPARIN SODIUM 40 MG: 40 INJECTION SUBCUTANEOUS at 05:02

## 2025-02-04 RX ADMIN — METFORMIN HYDROCHLORIDE 500 MG: 500 TABLET ORAL at 10:02

## 2025-02-04 RX ADMIN — INSULIN ASPART 6 UNITS: 100 INJECTION, SOLUTION INTRAVENOUS; SUBCUTANEOUS at 12:02

## 2025-02-04 RX ADMIN — MEMANTINE 10 MG: 5 TABLET ORAL at 08:02

## 2025-02-04 RX ADMIN — INSULIN GLARGINE 20 UNITS: 100 INJECTION, SOLUTION SUBCUTANEOUS at 08:02

## 2025-02-04 RX ADMIN — INSULIN ASPART 3 UNITS: 100 INJECTION, SOLUTION INTRAVENOUS; SUBCUTANEOUS at 08:02

## 2025-02-04 RX ADMIN — MICONAZOLE NITRATE: 20 POWDER TOPICAL at 08:02

## 2025-02-04 RX ADMIN — ALUMINUM HYDROXIDE, MAGNESIUM HYDROXIDE, AND DIMETHICONE 30 ML: 200; 20; 200 SUSPENSION ORAL at 08:02

## 2025-02-04 RX ADMIN — ALUMINUM HYDROXIDE, MAGNESIUM HYDROXIDE, AND DIMETHICONE 30 ML: 200; 20; 200 SUSPENSION ORAL at 05:02

## 2025-02-04 RX ADMIN — ATORVASTATIN CALCIUM 20 MG: 10 TABLET, FILM COATED ORAL at 10:02

## 2025-02-04 RX ADMIN — GLIMEPIRIDE 4 MG: 2 TABLET ORAL at 06:02

## 2025-02-04 RX ADMIN — ALUMINUM HYDROXIDE, MAGNESIUM HYDROXIDE, AND DIMETHICONE 30 ML: 200; 20; 200 SUSPENSION ORAL at 06:02

## 2025-02-04 RX ADMIN — MICONAZOLE NITRATE: 20 POWDER TOPICAL at 10:02

## 2025-02-04 RX ADMIN — LEVOTHYROXINE SODIUM 100 MCG: 0.1 TABLET ORAL at 06:02

## 2025-02-04 NOTE — PT/OT/SLP PROGRESS
"Name: Brittany Nunn    : 1935 (89 y.o.)  MRN: [id number]           Patient Not Seen      Per CNA, pt had just been assisted back to bed due to c/o "back pain".  Will f/u as appropriate.         "

## 2025-02-04 NOTE — PROGRESS NOTES
[facility]  [facility] Medicine  Progress Note    Patient Name: Brittany Nunn  MRN: [id number]  Patient Class: IP- Swing   Admission Date: 1/27/2025  Length of Stay: 8 days  Attending Physician: Jordon Noble MD  Primary Care Provider: Ester, Primary Doctor        Subjective     Principal Problem:Physical deconditioning        HPI:  89 yo female admitted to swing bed today for continued therapy.  She is s/p a fall at home in which she tripped and fell on her right shoulder.  No LOC. She was sent to [facility] where she was found to have an impacted humeral head/neck fracture with some mild subluxation.  This was determined to be non-surgical.  She does c/o pain to her shoulder otherwise she stats she is calm.  Currently no N/V/D/C.  No fever/chills.  NO chest pian/SOB.  She will be admitted for continued PT/OT services and pain control.    Overview/Hospital Course:  1/28/25: Pt was in the process of changing rooms when I saw her this morning. She was nauseous due to her right arm pain. She had vomited a small amount of bile. She denied any cp or sob. I spoke to her daughter this afternoon. She lives in [place]. There is a brother that has passed away, and another brother that lives in [place]. She states that they have been noticing a decline of their parents over the last year and are preparing for the fact that their living situation likely needs to change. She is available by phone if we have any questions. She states that the pt was using a walker, possibly daily, for ambulation. They were eating microwavable foods, mostly. For groceries they do grocery . Also states that since her TIA, the pt has had some urinary incontinence, requiring the use of some type of padding/diaper.    1/29/25: PT sitting up in her recliner during multidisciplinary rounds meeting. Daughter was on the phone. OT/ST is recommending 24 hour care for safety and ADLs. Daughter understands and will start  "working on this. Pt denies sob, cp, abd pain, constipation. Last bm yesterday. Normally has a bm oqd. She is only complaining of right arm pain.     1/30/25: Pt sitting up in her recliner. She asked me to open her diet coke. She said she cannot use her right hand at all to help her open it. Also said that her left hand isn't strong enough to pop open the tab. I pointed out that even these simple tasks are difficult for her now. She spoke to her daughter on the phone earlier this morning. I asked if they had started discussing a plan for care once she is discharged. She said she plans to stay here until her insurance "runs out." I asked what the plan was when she goes home. She said that they were looking at finding a family member that could come in for breakfast and then again around lunch. I reminded her that we are recommending 24 hour care. She said they did not discuss that. I recommended they do so. Reminded her of the options her daughter had mentioned yesterday of either a NH or for the pt to go and live with on the children. Pt said that "maybe someone could drive me to [place] to stay with my daughter-in-law, but she has 2 teenagers." I encouraged her to discuss this further with her daughter. No complaints at this time.    1/31/25: Pt sitting up in her recliner. No complaints this morning. CBG is 450s. Scheduled 5 units given. Post prandial, remains >400. Pt had pancakes for breakfast with syrup on the diabetic diet.  Pt informed the nurse that at home she takes metformin and glimepiride. These were not listed on her home meds upon admission. I will restart these.     2/1/25: Pt sitting up in her recliner. States that her  is coming to visit her today. She is happy about this. Her grand daughter is bringing him. She is having bm's more than usual. I will change the docusate to prn. No other complaints.    2/2/25: Pt lying in bed. She had a nice visit with her  yesterday. He is in a wheel " chair. She laughed and said he asked if he could stay the night with her in the hospital. She has no complaints this morning.     2/3/25: Pt was sitting up in her recliner this morning. She was vomiting bile earlier. She states it is due to her GERD and that this happens at home. Pt's insurance review is today. We are expecting pt will be ready for dc by the end of the week. CM is communicating with family and encouraging a long term plan for 24 hr care. Awaiting their decision.    2/4/25-No changes today.  She is doing well at this time.  D/C later this week.      Interval History:     Review of Systems   Constitutional:  Positive for activity change.   HENT: Negative.     Eyes: Negative.    Respiratory: Negative.     Cardiovascular: Negative.    Gastrointestinal: Negative.    Endocrine: Negative.    Genitourinary: Negative.    Musculoskeletal:  Positive for arthralgias.   Skin: Negative.    Allergic/Immunologic: Negative.    Neurological: Negative.    Hematological: Negative.    Psychiatric/Behavioral: Negative.       Objective:     Vital Signs (Most Recent):  Temp: 98.3 °F (36.8 °C) (02/04/25 0759)  Pulse: (!) 113 (02/04/25 0759)  Resp: 18 (02/04/25 0759)  BP: (!) 95/57 (02/04/25 0759)  SpO2: 97 % (02/04/25 0759) Vital Signs (24h Range):  Temp:  [98.1 °F (36.7 °C)-98.3 °F (36.8 °C)] 98.3 °F (36.8 °C)  Pulse:  [] 113  Resp:  [18] 18  SpO2:  [96 %-98 %] 97 %  BP: ()/(57-71) 95/57     Weight: 66.1 kg (145 lb 11.6 oz)  Body mass index is 24.25 kg/m².    Intake/Output Summary (Last 24 hours) at 2/4/2025 1156  Last data filed at 2/4/2025 0800  Gross per 24 hour   Intake 780 ml   Output --   Net 780 ml         Physical Exam  Constitutional:       Appearance: Normal appearance. She is normal weight.   HENT:      Head: Normocephalic and atraumatic.      Nose: Nose normal.      Mouth/Throat:      Mouth: Mucous membranes are moist.      Pharynx: Oropharynx is clear.   Eyes:      Extraocular Movements:  "Extraocular movements intact.      Conjunctiva/sclera: Conjunctivae normal.      Pupils: Pupils are equal, round, and reactive to light.   Cardiovascular:      Rate and Rhythm: Normal rate and regular rhythm.      Pulses: Normal pulses.      Heart sounds: Normal heart sounds.   Pulmonary:      Effort: Pulmonary effort is normal.      Breath sounds: Normal breath sounds.   Abdominal:      General: Bowel sounds are normal.      Palpations: Abdomen is soft.   Musculoskeletal:         General: Normal range of motion.      Cervical back: Normal range of motion and neck supple.   Skin:     General: Skin is warm and dry.      Capillary Refill: Capillary refill takes 2 to 3 seconds.   Neurological:      General: No focal deficit present.      Mental Status: She is alert and oriented to person, place, and time. Mental status is at baseline.   Psychiatric:         Mood and Affect: Mood normal.         Behavior: Behavior normal.         Thought Content: Thought content normal.         Judgment: Judgment normal.             Significant Labs: All pertinent labs within the past 24 hours have been reviewed.  BMP:   Recent Labs   Lab 02/04/25 0435      K 4.2      CO2 33*   BUN 28.0*   CREATININE 0.89   CALCIUM 10.2     CBC:   Recent Labs   Lab 02/03/25 0457 02/04/25 0435   WBC 8.60 8.41   HGB 9.2* 9.2*   HCT 30.4* 30.9*    326     CMP:   Recent Labs   Lab 02/03/25 0457 02/04/25 0435    142   K 4.1 4.2    101   CO2 29 33*   BUN 29.0* 28.0*   CREATININE 0.78 0.89   CALCIUM 10.0 10.2   ALBUMIN 3.1* 3.2*   BILITOT 0.5 0.5   ALKPHOS 80 92   AST 18 19   ALT 14 11     Magnesium: No results for input(s): "MG" in the last 48 hours.    Significant Imaging: I have reviewed all pertinent imaging results/findings within the past 24 hours.    Assessment and Plan     * Physical deconditioning  Admit to swing bed  OOB  PT/OT  Resume transfer meds  DVT prophylaxis  Follow labs      GERD (gastroesophageal reflux " disease)  Pt has had multiple episodes of vomiting bile in the mornings. She should have further outpt workup with her PCP/GI to address this.       Type 2 diabetes mellitus, with long-term current use of insulin  Resume lantus and aspart 5 units tid.  1/29/25: Increase lantus to 20units. CBGs >400. Continue to monitor.  1/30/25: Increase lantus to 22units.  this morning. Continue to monitor.  1/31/25: Pt states that she also takes metformin and glimepiride at home. These meds were added to her home med list and I am restarting them. I will decrease glargine back to home dose for tonight and re-evaluate in the morning. moderate sliding scale in place of scheduled aspart, as pt was requiring over her 5 units. Will monitor, may need to change to low.    Closed fracture of proximal end of right humerus  PT/OT  Recs as per ortho      Moderate malnutrition  Nutrition consulted. Most recent weight and BMI monitored-     Measurements:  Wt Readings from Last 1 Encounters:   02/02/25 66.1 kg (145 lb 11.6 oz)   Body mass index is 24.25 kg/m².    Patient has been screened and assessed by RD.    Malnutrition Type:  Context: acute illness or injury  Level: moderate    Malnutrition Characteristic Summary:  Weight Loss (Malnutrition): other (see comments) (Does not meet criteria)  Energy Intake (Malnutrition): less than or equal to 50% for greater than or equal to 5 days  Subcutaneous Fat (Malnutrition): mild depletion  Muscle Mass (Malnutrition): mild depletion  Fluid Accumulation (Malnutrition): other (see comments) (Not present)  Hand  Strength, Right (Malnutrition): Measurably reduced for age/gender    Interventions/Recommendations (treatment strategy):  Oral nutritional supplement;Care coordination or referral;Feeding assistance/management;Oral diet/nutrient modifications        VTE Risk Mitigation (From admission, onward)           Ordered     enoxaparin injection 40 mg  Daily         01/27/25 1832                 DVT prophylaxis  Therapy  OOB  Resume current meds  Discharge Planning   ISATU:      Code Status: Full Code   Medical Readiness for Discharge Date:   Discharge Plan A: Home with family, Home Health   Discharge Delays: None known at this time            Please place Justification for DME        Jordon Noble MD  Department of Hospital Medicine   [facility]

## 2025-02-04 NOTE — PT/OT/SLP PROGRESS
"         Physical Therapy Treatment Note           Name: Brittany Nunn    : 1935 (89 y.o.)  MRN: [id number]             Subjective Assessment:     No complaints  Lethargic   X Awake, alert, cooperative  Uncooperative    Agitated X c/o pain    Appropriate X c/o fatigue   X Confused  Treated at bedside     Emotionally labile  Treated in gym/dept.    Impulsive  Other:    Flat affect       Pain/Comfort:    Location - Side 1: Right  Location 1: arm  Location - Orientation 2: lower  Location 2: back    Therapy Precautions:     X Cognitive deficits  Spinal precautions    Collar - hard  Sternal precautions    Collar - soft   TLSO   X Fall risk  LSO    Hip precautions - posterior  Knee immobilizer    Hip precautions - anterior  WBAT    Impaired communication  Partial weightbearing    Oxygen  TTWB    PEG tube X NWB RUE in sling    Visual deficits  Other:    Hearing deficits               Mobility Training:     Assist Level Assistive Device Comments    Bed Mobility Sup  Semi-supine to sitting at EOB.  Bed railing utilized for assistance.     Sit to stand/Stand to sit Martinez SBQC Sit to stand/stand to sit.  Pt stood from EOB and was provided very minimal assistance to stand.  Pt returned to sitting in the recliner.     Transfers      Gait Sup SBQC Pt able to ambulate 145 ft with a step through gait pattern and slow but steady gait speed.  Good sequencing observed with use of the SBQC.  No LOB noted.    Balance Training      Wheelchair Mobility      Stair Climbing      Car Transfer      Other:               Assessment:     Patient tolerated session fairly well and reported that she was feeling "better than yesterday". Pt was agreeable to ambulation in the hallway and sat in the recliner at the conclusion of treatment.  Likely D/C anticipated for the end of this week.  CM attempting to contact pt's family regarding a D/C plan as PT/OT continue to recommend 24 hour care.        GOALS:   Multidisciplinary Problems       " Physical Therapy Goals          Problem: Physical Therapy    Goal Priority Disciplines Outcome Interventions   Physical Therapy Goal     PT, PT/OT Progressing    Description: Patient will increase functional independence with mobility by performin. Supine to sit with Stand-by Assistance  2. Sit to supine with Stand-by Assistance  3. Sit to stand transfer with Stand-by Assistance  4. Gait  x 75 feet with Stand-by Assistance using No Assistive Device vs SQBC.                            Discharge Recommendations:      24 hour care    Discharge Equipment Recommendations:      (TBD)     At end of treatment, patient remained:     X Up in chair     In bed   X With alarm activated    Bed rails up   X Call bell in reach      Family/friends present     Restraints secured properly     In bathroom with CNA/RN notified     In gym with PT/PTA/tech     Nurse aware     Other:           PT Start Time: 1226     PT Stop Time: 1242  PT Total Time (min): 16 min     Billable Minutes: Gait Training 2025

## 2025-02-04 NOTE — SUBJECTIVE & OBJECTIVE
Interval History:     Review of Systems   Constitutional:  Positive for activity change.   HENT: Negative.     Eyes: Negative.    Respiratory: Negative.     Cardiovascular: Negative.    Gastrointestinal: Negative.    Endocrine: Negative.    Genitourinary: Negative.    Musculoskeletal:  Positive for arthralgias.   Skin: Negative.    Allergic/Immunologic: Negative.    Neurological: Negative.    Hematological: Negative.    Psychiatric/Behavioral: Negative.       Objective:     Vital Signs (Most Recent):  Temp: 98.3 °F (36.8 °C) (02/04/25 0759)  Pulse: (!) 113 (02/04/25 0759)  Resp: 18 (02/04/25 0759)  BP: (!) 95/57 (02/04/25 0759)  SpO2: 97 % (02/04/25 0759) Vital Signs (24h Range):  Temp:  [98.1 °F (36.7 °C)-98.3 °F (36.8 °C)] 98.3 °F (36.8 °C)  Pulse:  [] 113  Resp:  [18] 18  SpO2:  [96 %-98 %] 97 %  BP: ()/(57-71) 95/57     Weight: 66.1 kg (145 lb 11.6 oz)  Body mass index is 24.25 kg/m².    Intake/Output Summary (Last 24 hours) at 2/4/2025 1156  Last data filed at 2/4/2025 0800  Gross per 24 hour   Intake 780 ml   Output --   Net 780 ml         Physical Exam  Constitutional:       Appearance: Normal appearance. She is normal weight.   HENT:      Head: Normocephalic and atraumatic.      Nose: Nose normal.      Mouth/Throat:      Mouth: Mucous membranes are moist.      Pharynx: Oropharynx is clear.   Eyes:      Extraocular Movements: Extraocular movements intact.      Conjunctiva/sclera: Conjunctivae normal.      Pupils: Pupils are equal, round, and reactive to light.   Cardiovascular:      Rate and Rhythm: Normal rate and regular rhythm.      Pulses: Normal pulses.      Heart sounds: Normal heart sounds.   Pulmonary:      Effort: Pulmonary effort is normal.      Breath sounds: Normal breath sounds.   Abdominal:      General: Bowel sounds are normal.      Palpations: Abdomen is soft.   Musculoskeletal:         General: Normal range of motion.      Cervical back: Normal range of motion and neck supple.  "  Skin:     General: Skin is warm and dry.      Capillary Refill: Capillary refill takes 2 to 3 seconds.   Neurological:      General: No focal deficit present.      Mental Status: She is alert and oriented to person, place, and time. Mental status is at baseline.   Psychiatric:         Mood and Affect: Mood normal.         Behavior: Behavior normal.         Thought Content: Thought content normal.         Judgment: Judgment normal.             Significant Labs: All pertinent labs within the past 24 hours have been reviewed.  BMP:   Recent Labs   Lab 02/04/25 0435      K 4.2      CO2 33*   BUN 28.0*   CREATININE 0.89   CALCIUM 10.2     CBC:   Recent Labs   Lab 02/03/25 0457 02/04/25 0435   WBC 8.60 8.41   HGB 9.2* 9.2*   HCT 30.4* 30.9*    326     CMP:   Recent Labs   Lab 02/03/25 0457 02/04/25 0435    142   K 4.1 4.2    101   CO2 29 33*   BUN 29.0* 28.0*   CREATININE 0.78 0.89   CALCIUM 10.0 10.2   ALBUMIN 3.1* 3.2*   BILITOT 0.5 0.5   ALKPHOS 80 92   AST 18 19   ALT 14 11     Magnesium: No results for input(s): "MG" in the last 48 hours.    Significant Imaging: I have reviewed all pertinent imaging results/findings within the past 24 hours.  "

## 2025-02-04 NOTE — PT/OT/SLP PROGRESS
Occupational Therapy  Treatment    Name: Brittany Nunn    : 1935 (89 y.o.)  MRN: [id number]          TREATMENT SUMMARY AND RECOMMENDATIONS:      Subjective Assessment:   No complaints  Lethargic   x Awake, alert, cooperative  Uncooperative    Agitated  Flat affect    Appropriate  c/o fatigue   x Confused x Treated at bedside     Emotionally liable  Treated in gym/dept.    Impulsive x Other: Pt reported sleeping better last night and feeling better today.       Pain/Comfort:  Pain Rating 1: 2/10  Location - Side 1: Right  Location 1: shoulder  Pain Addressed 1: Reposition, Distraction, Other (see comments) (Moist heat applied 10mins followed by therapeutic massage with topical analgesic.)      Therapy Precautions:   x Cognitive deficits  Spinal precautions    Collar - hard  Sternal precautions    Collar - soft   TLSO   x Fall risk  LSO    Hip precautions - posterior  Knee immobilizer    Hip precautions - anterior  WBAT    Impaired communication  Partial weightbearing    Oxygen  TTWB    PEG tube RUE NWB    Visual deficits x Other: RUE sling.    Hearing deficits           Vitals Value   x Room air      Oxygen (L)     Blood pressure     Heart rate         Treatment Objectives:     Mobility Training:    Mobility task Assist level Comments    Bed mobility SBA Transfer training sit<>supine SBA using be rail from left side   Balance training     Transfer CGA Transfer training recliner <>bed CGA with SBQC and verbal cuing for proper hand placement   Functional mobility     Sit to stand transitions CGA Transfer training sit <>stand 3 x's during session CGA and verbal cuing for proper hand placement.   Other:       ADL Training:    ADL Assist level Comments    Feeding     Grooming/hygiene Setup ADL grooming training performed sitting in recliner.  After setup, pt able to wash face and perform oral care.  Today pt able to hold toothpaste with right hand while removing/applying toothpaste top, and apply toothpaste to  brush   Bathing     Upper body dressing     Lower body dressing     Toileting     Toilet transfer     Adaptive equipment training     Other:         Additional Treatment:  While supine in bed moist heat applied to RUE shoulder to elbow 10 mins followed by therapeutic massage with topical analgesic to upper traps, scapular region, and upper arm.  Performed right elbow PROM 1x10 reps flex/ext, noted ROM has increase to WNLs.  Performed AROM ex's 1x10 reps forearm sup/pron, wrist flex/ext with 3 verbal cues for proper technique.    Assessment: Patient tolerated session fair.  Pt having better day today.  Performed functional transfers with CGA using SBQC.  Noted decreased edema in RUE, ecchymoses conts to be very prevalent throughout UE.  Continue to recommend 24-hour care to assist with transfers and ADLs.    OT Plan: OT to focus next treatment session on pain/edema management, transfer training, and ADL training.      GOALS:   Multidisciplinary Problems       Occupational Therapy Goals          Problem: Occupational Therapy    Goal Priority Disciplines Outcome Interventions   Occupational Therapy Goal     OT, PT/OT Progressing    Description:   Patient will increase functional independence with ADLs by performing:    LE Dressing with Minimal Assistance.  Toileting from bedside commode with Contact Guard Assistance for hygiene and clothing management.   Bathing from  shower chair/bench with Minimal Assistance.  Toilet transfer to bedside commode with Contact Guard Assistance.                         Communication with Treatment Team:     Discharge Recommendations:    Discharge Equipment Recommendations:   (TBD)  Barriers to discharge:  Decreased caregiver support      At end of treatment, patient remained:  x Up in chair     In bed   x With alarm activated    Bed rails up   x Call bell in reach     Family/friends present    Restraints secured properly    In bathroom with CNA/RN notified    In gym with PT/PTA/tech   x  Nurse aware    Other:         OT Date of Treatment: 02/04/25  OT Start Time: 0910  OT Stop Time: 0942  OT Total Time (min): 32 min    Billable Minutes:Self Care/Home Management 12  Therapeutic Activity 20      2/4/2025

## 2025-02-04 NOTE — PT/OT/SLP PROGRESS
"Speech Language Pathology Treatment    Patient Name:  Brittany Nunn   MRN:  [id number]  Admitting Diagnosis: Physical deconditioning    Recommendations:                      General Recommendations:  Cognitive-linguistic therapy/ dementia education/training  Diet recommendations:  Easy to Chew Diet - IDDSI Level 7, Thin liquids - IDDSI Level 0   Aspiration Precautions:  n/a    General Precautions: Standard, fall (confusion / dementia)  Communication strategies:   reduce distractions, ensure Pt's hearing aides are in and on, allow Pt to see speakers mouth    Assessment:     Brittany Nunn is a 89 y.o. 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  She presents with reduced awareness and insight for adequate problem solving/reasoning, reduced receptive and expressive language skills at mod-complex levels. MOCA complete with score of 16/30 - deficits noted in visuospatial/executive, attention, language, and delayed recall.     Subjective     Pt participated well throughout. Pt asked good questions and participated in education re: cognitive deficits with aging.     Patient goals: "get home"      Pain/Comfort:   No mild pain reported in hip, Pt given rest breaks as needed    Respiratory Status: Room air    Objective:     Pt participated in MOCA assessment. SLP educated Pt on assessment as good marker of skill level (for now and for the future) and reviewed skills targeted within subtests.     Ronnie Cognitive Assessment Version 7.1 (original version)  Results as follows:     Visuospatial/executive: 2/5  Naming: 3/3  Attention: 2/2, 0/1, 0/3  Language: 1/2, 0/1  Abstraction: 2/2  Delayed Recall: 0/5  Orientation: 6/6  Total: 16/30  (Normal >= 26/30)    Results discussed with Pt in depth, along with relation to past session performance and functional ADLs and IADLs.     Has the patient been evaluated by SLP for swallowing?   No  Keep patient " NPO? No     Goals:   Multidisciplinary Problems       SLP Goals          Problem: SLP    Goal Priority Disciplines Outcome   SLP Goal     SLP Progressing   Description: LTGs:  Pt will demonstrate improved recall and use of strategies for improved communication, safety, and participation in ADLs.    STGs:  Pt/family will participate in dementia education/training and complete teach-back for improved communication and strategy use to increase safety and participation in ADLs.   Pt will demonstrate recall and use of strategies with min cues for improved communication, safety, and participation in ADLs.                        Plan:   Con't POC.   Patient to be seen:   (3-5x/week)   Plan of Care expires:     Plan of Care reviewed with:  patient, daughter   SLP Follow-Up:  Yes       Discharge recommendations:    home with  care or nursing home  Barriers to Discharge:  Level of Skilled Assistance Needed   and Safety Awareness      Time Tracking:     SLP Treatment Date:   02/04/25  Speech Start Time:  0933  Speech Stop Time:  1000     Speech Total Time (min):  27 min    Billable Minutes: cog 27 min / 2 units    Bebe Pappas MA, CCC-SLP  02/04/2025

## 2025-02-04 NOTE — PLAN OF CARE
Problem: Adult Inpatient Plan of Care  Goal: Plan of Care Review  Outcome: Progressing  Goal: Patient-Specific Goal (Individualized)  Outcome: Progressing  Goal: Absence of Hospital-Acquired Illness or Injury  Outcome: Progressing  Goal: Optimal Comfort and Wellbeing  Outcome: Progressing  Goal: Readiness for Transition of Care  Outcome: Progressing     Problem: Diabetes Comorbidity  Goal: Blood Glucose Level Within Targeted Range  Outcome: Progressing     Problem: Skin Injury Risk Increased  Goal: Skin Health and Integrity  Outcome: Progressing     Problem: Fall Injury Risk  Goal: Absence of Fall and Fall-Related Injury  Outcome: Progressing     Problem: Wound  Goal: Optimal Coping  Outcome: Progressing  Goal: Optimal Functional Ability  Outcome: Progressing  Goal: Absence of Infection Signs and Symptoms  Outcome: Progressing  Goal: Improved Oral Intake  Outcome: Progressing  Goal: Optimal Pain Control and Function  Outcome: Progressing  Goal: Skin Health and Integrity  Outcome: Progressing  Goal: Optimal Wound Healing  Outcome: Progressing     Problem: Pain Acute  Goal: Optimal Pain Control and Function  Outcome: Progressing

## 2025-02-04 NOTE — PROGRESS NOTES
Inpatient Nutrition Assessment    Admit Date: 1/27/2025   Total duration of encounter: 8 days   Patient Age: 89 y.o.    Nutrition Recommendation/Prescription     2000 kcal Diabetic Diet; Easy to Chew (IDDSI Level 7).  Encourage intake and honor preferences as able.   Boost Glucose Control TID (provides 190 kcal and 16 g protein per serving).  Medical mgmt of hyperglycemia.  Will continue to monitor wt, labs, and po intake.    Communication of Recommendations: reviewed with patient and EMR    Nutrition Assessment     Malnutrition Assessment/Nutrition-Focused Physical Exam    Malnutrition Context: acute illness or injury (01/29/25 1039)  Malnutrition Level: moderate (01/29/25 1039)  Energy Intake (Malnutrition): less than or equal to 50% for greater than or equal to 5 days (01/29/25 1039)  Weight Loss (Malnutrition): other (see comments) (Does not meet criteria) (01/29/25 1039)  Subcutaneous Fat (Malnutrition): mild depletion (01/29/25 1039)  Orbital Region (Subcutaneous Fat Loss): mild depletion  Upper Arm Region (Subcutaneous Fat Loss): mild depletion     Muscle Mass (Malnutrition): mild depletion (01/29/25 1039)  [place] Region (Muscle Loss): mild depletion           Dorsal Hand (Muscle Loss): mild depletion           Fluid Accumulation (Malnutrition): other (see comments) (Not present) (01/29/25 1039)     Hand  Strength, Right (Malnutrition): Measurably reduced for age/gender (01/29/25 1039)  A minimum of two characteristics is recommended for diagnosis of either severe or non-severe malnutrition.    Chart Review    Reason Seen: [id number]    Malnutrition Screening Tool Results   Have you recently lost weight without trying?: No  Have you been eating poorly because of a decreased appetite?: No   MST Score: 0   Diagnosis:   Physical deconditioning 1/27/2025      Moderate malnutrition 1/27/2025      Closed fracture of proximal end of right humerus 1/27/2025      Type 2 diabetes mellitus, with long-term  current use of insulin 1/27/2025           Relevant Medical History: No past medical history on file.     Scheduled Medications:  aluminum-magnesium hydroxide-simethicone, 30 mL, QID (AC & HS)  atorvastatin, 20 mg, Daily  enoxparin, 40 mg, Daily  glimepiride, 4 mg, Before breakfast  insulin glargine U-100, 20 Units, QHS  levothyroxine, 100 mcg, Before breakfast  lisinopriL, 5 mg, Daily  memantine, 10 mg, BID  metFORMIN, 500 mg, BID WM  miconazole NITRATE 2 %, , BID    Continuous Infusions:   PRN Medications:  acetaminophen, 650 mg, Q6H PRN  dextrose 50%, 12.5 g, PRN  dextrose 50%, 25 g, PRN  docusate sodium, 100 mg, Daily PRN  glucagon (human recombinant), 1 mg, PRN  glucose, 16 g, PRN  glucose, 24 g, PRN  HYDROcodone-acetaminophen, 1 tablet, Q6H PRN  insulin aspart U-100, 0-10 Units, TID WM PRN  melatonin, 6 mg, Nightly PRN  naloxone, 0.02 mg, PRN  ondansetron, 4 mg, Q8H PRN  sodium chloride 0.9%, 10 mL, Q12H PRN    Calorie Containing IV Medications: no significant kcals from medications at this time    Recent Labs   Lab 01/29/25  0448 01/30/25  0507 01/31/25  0500 02/01/25  0512 02/02/25  0521 02/03/25  0457 02/04/25  0435    137 138 141 142 141 142   K 4.3 4.3 4.2 4.0 4.2 4.1 4.2   CALCIUM 9.6 9.7 9.8 9.8 10.1 10.0 10.2    101 100 101 101 102 101   CO2 29 30 28 31 32* 29 33*   BUN 28.0* 27.0* 26.0* 29.0* 33.0* 29.0* 28.0*   CREATININE 0.79 0.74 0.84 0.84 0.78 0.78 0.89   EGFRNORACEVR >60 >60 >60 >60 >60 >60 >60   GLUCOSE 267* 233* 273* 189* 123* 88 92   BILITOT 0.5 0.6 0.7 0.5 0.4 0.5 0.5   ALKPHOS 75 74 82 76 78 80 92   ALT 14 13 14 15 13 14 11   AST 17 15 18 17 18 18 19   ALBUMIN 3.0* 3.0* 3.2* 3.0* 3.0* 3.1* 3.2*   WBC 7.77 8.65 8.55 8.56 8.12 8.60 8.41   HGB 8.8* 8.4* 9.1* 8.8* 8.9* 9.2* 9.2*   HCT 28.8* 27.7* 29.5* 28.9* 29.6* 30.4* 30.9*     Nutrition Orders:  Diet diabetic Easy to Chew (IDDSI Level 7); 2000 Calories (up to 75 gm per meal); Standard Tray  Dietary nutrition supplements TID; Boost  "Glucose Control - Any flavor    Appetite/Oral Intake: fair/50-75% of meals  Factors Affecting Nutritional Intake: decreased appetite  Social Needs Impacting Access to Food: none identified  Food/Gnosticism/Cultural Preferences: unable to obtain  Food Allergies: no known food allergies  Last Bowel Movement: 02/04/25  Wound(s):[REMOVED]      Wound 01/27/25 2100 Other (comment) Left lower Arm-Tissue loss description: Not applicable     Comments    1/28/25: Pt w/ decreased appetite and intake x 1 week. No n/v/d/c at this time. Pt was only eating light foods/soups at [facility] and nursing was assisting pt in chopping food into smaller pieces. Will add easy to chew modifier so meats are more tender. Last BM 2 days ago. No wt loss noted per EMR. Labs and meds reviewed, Glu remains elevated.    1/30/25: Pt reports decreased appetite and intake. States she didn't think she could have rice and carbs and she was surprised to see them on her plate. Says it's a lot of food. Reports some nausea yesterday. Pt has been drinking the Boost and liking them. Last BM 2 days ago.    2/4/25: Pt appetite and intake have started to improve. Pt did throw up some bile yesterday 2/2 reflux but reported that this wasn't unusual for her. Last BM today. Alb trending up at 3.2.    Anthropometrics    Height: 5' 5" (165.1 cm), Height Method: Stated  Last Weight: 66.1 kg (145 lb 11.6 oz) (02/02/25 0500), Weight Method: Bed Scale  BMI (Calculated): 24.2  BMI Classification: overweight (BMI 25-29.9)     Ideal Body Weight (IBW), Female: 125 lb     % Ideal Body Weight, Female (lb): 118.87 %                             Usual Weight Provided By: EMR weight history    Wt Readings from Last 5 Encounters:   02/02/25 66.1 kg (145 lb 11.6 oz)   01/27/25 67.5 kg (148 lb 13 oz)   09/30/19 70.5 kg (155 lb 6.8 oz)     Weight Change(s) Since Admission: overall stable; fluctuating  Wt Readings from Last 1 Encounters:   02/02/25 0500 66.1 kg (145 lb 11.6 oz)   01/28/25 0500 " 69 kg (152 lb 1.9 oz)   01/27/25 2100 67.4 kg (148 lb 9.4 oz)   Admit Weight: 67.4 kg (148 lb 9.4 oz) (01/27/25 2100), Weight Method: Bed Scale    Estimated Needs    Weight Used For Calorie Calculations: 69 kg (152 lb 1.9 oz)  Energy Calorie Requirements (kcal): 7353-6378 kcal (25-30 kcal/kg)  Energy Need Method: Kcal/kg  Weight Used For Protein Calculations: 69 kg (152 lb 1.9 oz)  Protein Requirements: 69-83 g (1.0-1.2 g/kg)  Fluid Requirements (mL): 2070 mL/d (30 mL/kg)  CHO Requirement: 228 g/d (45% of EER)     Enteral Nutrition     Patient not receiving enteral nutrition at this time.    Parenteral Nutrition     Patient not receiving parenteral nutrition support at this time.    Evaluation of Received Nutrient Intake    Calories: meeting estimated needs  Protein: meeting estimated needs    Patient Education     Not applicable.    Nutrition Diagnosis     PES: Inadequate oral intake related to acute illness as evidenced by <50% energy intake x >5 days. (resolved)     PES: Moderate acute disease or injury related malnutrition Related to acute illness/injury As Evidenced by:  - energy intake: <= 50% for 5 days (meets criteria for <= 50% >= 5 days (severe - acute)) - muscle mass depletion: 2 areas of mild muscle loss (Temporalis, Interosseous) - loss of subcutaneous fat: 2 areas of mild fat loss (Triceps Skinfold, Infraorbital) -  strength: Measurably reduced for age/gender active    Nutrition Interventions     Intervention(s): general/healthful diet, modified composition of meals/snacks, commercial beverage, multivitamin/mineral supplement therapy, prescription medication, and collaboration with other providers  Intervention(s): Oral nutritional supplement;Care coordination or referral;Feeding assistance/management;Oral diet/nutrient modifications    Goal: Meet greater than 80% of nutritional needs by follow-up. (goal progressing)  Goal: Maintain weight throughout hospitalization. (goal met)    Nutrition Goals &  Monitoring     Dietitian will monitor: food and beverage intake, weight, electrolyte/renal panel, glucose/endocrine profile, and gastrointestinal profile  Discharge planning: resume home regimen  Nutrition Risk/Follow-Up: moderate (follow-up in 3-5 days)   Please consult if re-assessment needed sooner.

## 2025-02-05 LAB
ALBUMIN SERPL-MCNC: 3.7 G/DL (ref 3.4–4.8)
ALBUMIN/GLOB SERPL: 0.9 RATIO (ref 1.1–2)
ALP SERPL-CCNC: 113 UNIT/L (ref 40–150)
ALT SERPL-CCNC: 14 UNIT/L (ref 0–55)
ANION GAP SERPL CALC-SCNC: 15 MEQ/L
AST SERPL-CCNC: 20 UNIT/L (ref 5–34)
BASOPHILS # BLD AUTO: 0.09 X10(3)/MCL
BASOPHILS NFR BLD AUTO: 0.9 %
BILIRUB SERPL-MCNC: 0.5 MG/DL
BUN SERPL-MCNC: 29 MG/DL (ref 9.8–20.1)
CALCIUM SERPL-MCNC: 10.9 MG/DL (ref 8.4–10.2)
CHLORIDE SERPL-SCNC: 98 MMOL/L (ref 98–107)
CO2 SERPL-SCNC: 29 MMOL/L (ref 23–31)
CREAT SERPL-MCNC: 0.95 MG/DL (ref 0.55–1.02)
CREAT/UREA NIT SERPL: 31
EOSINOPHIL # BLD AUTO: 0.29 X10(3)/MCL (ref 0–0.9)
EOSINOPHIL NFR BLD AUTO: 2.8 %
ERYTHROCYTE [DISTWIDTH] IN BLOOD BY AUTOMATED COUNT: 20.6 % (ref 11.5–17)
GFR SERPLBLD CREATININE-BSD FMLA CKD-EPI: 58 ML/MIN/1.73/M2
GLOBULIN SER-MCNC: 4.1 GM/DL (ref 2.4–3.5)
GLUCOSE SERPL-MCNC: 147 MG/DL (ref 82–115)
HCT VFR BLD AUTO: 35.1 % (ref 37–47)
HGB BLD-MCNC: 10.3 G/DL (ref 12–16)
IMM GRANULOCYTES # BLD AUTO: 0.08 X10(3)/MCL (ref 0–0.04)
IMM GRANULOCYTES NFR BLD AUTO: 0.8 %
LYMPHOCYTES # BLD AUTO: 4.08 X10(3)/MCL (ref 0.6–4.6)
LYMPHOCYTES NFR BLD AUTO: 39.3 %
MCH RBC QN AUTO: 25.2 PG (ref 27–31)
MCHC RBC AUTO-ENTMCNC: 29.3 G/DL (ref 33–36)
MCV RBC AUTO: 85.8 FL (ref 80–94)
MONOCYTES # BLD AUTO: 0.59 X10(3)/MCL (ref 0.1–1.3)
MONOCYTES NFR BLD AUTO: 5.7 %
NEUTROPHILS # BLD AUTO: 5.24 X10(3)/MCL (ref 2.1–9.2)
NEUTROPHILS NFR BLD AUTO: 50.5 %
NRBC BLD AUTO-RTO: 0 %
PLATELET # BLD AUTO: 428 X10(3)/MCL (ref 130–400)
PMV BLD AUTO: 9.1 FL (ref 7.4–10.4)
POCT GLUCOSE: 167 MG/DL (ref 70–110)
POCT GLUCOSE: 176 MG/DL (ref 70–110)
POCT GLUCOSE: 235 MG/DL (ref 70–110)
POCT GLUCOSE: 287 MG/DL (ref 70–110)
POTASSIUM SERPL-SCNC: 4.1 MMOL/L (ref 3.5–5.1)
PROT SERPL-MCNC: 7.8 GM/DL (ref 5.8–7.6)
RBC # BLD AUTO: 4.09 X10(6)/MCL (ref 4.2–5.4)
SODIUM SERPL-SCNC: 142 MMOL/L (ref 136–145)
WBC # BLD AUTO: 10.37 X10(3)/MCL (ref 4.5–11.5)

## 2025-02-05 PROCEDURE — 63600175 PHARM REV CODE 636 W HCPCS: Performed by: INTERNAL MEDICINE

## 2025-02-05 PROCEDURE — 11000004 HC SNF PRIVATE

## 2025-02-05 PROCEDURE — 80053 COMPREHEN METABOLIC PANEL: CPT | Performed by: INTERNAL MEDICINE

## 2025-02-05 PROCEDURE — 97129 THER IVNTJ 1ST 15 MIN: CPT

## 2025-02-05 PROCEDURE — 97130 THER IVNTJ EA ADDL 15 MIN: CPT

## 2025-02-05 PROCEDURE — 97535 SELF CARE MNGMENT TRAINING: CPT

## 2025-02-05 PROCEDURE — 25000003 PHARM REV CODE 250: Performed by: INTERNAL MEDICINE

## 2025-02-05 PROCEDURE — 97530 THERAPEUTIC ACTIVITIES: CPT

## 2025-02-05 PROCEDURE — 36415 COLL VENOUS BLD VENIPUNCTURE: CPT | Performed by: INTERNAL MEDICINE

## 2025-02-05 PROCEDURE — 85025 COMPLETE CBC W/AUTO DIFF WBC: CPT | Performed by: INTERNAL MEDICINE

## 2025-02-05 PROCEDURE — 97116 GAIT TRAINING THERAPY: CPT

## 2025-02-05 RX ADMIN — METFORMIN HYDROCHLORIDE 500 MG: 500 TABLET ORAL at 08:02

## 2025-02-05 RX ADMIN — GLIMEPIRIDE 4 MG: 2 TABLET ORAL at 06:02

## 2025-02-05 RX ADMIN — LISINOPRIL 5 MG: 5 TABLET ORAL at 08:02

## 2025-02-05 RX ADMIN — MICONAZOLE NITRATE: 20 POWDER TOPICAL at 08:02

## 2025-02-05 RX ADMIN — ENOXAPARIN SODIUM 40 MG: 40 INJECTION SUBCUTANEOUS at 04:02

## 2025-02-05 RX ADMIN — ALUMINUM HYDROXIDE, MAGNESIUM HYDROXIDE, AND DIMETHICONE 30 ML: 200; 20; 200 SUSPENSION ORAL at 08:02

## 2025-02-05 RX ADMIN — ATORVASTATIN CALCIUM 20 MG: 10 TABLET, FILM COATED ORAL at 08:02

## 2025-02-05 RX ADMIN — ALUMINUM HYDROXIDE, MAGNESIUM HYDROXIDE, AND DIMETHICONE 30 ML: 200; 20; 200 SUSPENSION ORAL at 06:02

## 2025-02-05 RX ADMIN — ALUMINUM HYDROXIDE, MAGNESIUM HYDROXIDE, AND DIMETHICONE 30 ML: 200; 20; 200 SUSPENSION ORAL at 04:02

## 2025-02-05 RX ADMIN — LEVOTHYROXINE SODIUM 100 MCG: 0.1 TABLET ORAL at 06:02

## 2025-02-05 RX ADMIN — ALUMINUM HYDROXIDE, MAGNESIUM HYDROXIDE, AND DIMETHICONE 30 ML: 200; 20; 200 SUSPENSION ORAL at 11:02

## 2025-02-05 RX ADMIN — METFORMIN HYDROCHLORIDE 500 MG: 500 TABLET ORAL at 04:02

## 2025-02-05 RX ADMIN — MEMANTINE 10 MG: 5 TABLET ORAL at 08:02

## 2025-02-05 RX ADMIN — INSULIN ASPART 2 UNITS: 100 INJECTION, SOLUTION INTRAVENOUS; SUBCUTANEOUS at 06:02

## 2025-02-05 RX ADMIN — INSULIN GLARGINE 20 UNITS: 100 INJECTION, SOLUTION SUBCUTANEOUS at 08:02

## 2025-02-05 NOTE — PROGRESS NOTES
Inpatient Nutrition Assessment    Admit Date: 1/27/2025   Total duration of encounter: 9 days   Patient Age: 89 y.o.    Nutrition Recommendation/Prescription     2000 kcal Diabetic Diet; Easy to Chew (IDDSI Level 7).  Encourage intake and honor preferences as able.   Boost Glucose Control TID (provides 190 kcal and 16 g protein per serving).  Medical mgmt of hyperglycemia.  Will continue to monitor wt, labs, and po intake.    Communication of Recommendations: reviewed with provider, reviewed with patient, and reviewed with family    Nutrition Assessment     Malnutrition Assessment/Nutrition-Focused Physical Exam    Malnutrition Context: acute illness or injury (02/05/25 1129)  Malnutrition Level: moderate (02/05/25 1129)  Energy Intake (Malnutrition): less than or equal to 50% for greater than or equal to 5 days (02/05/25 1129)  Weight Loss (Malnutrition): other (see comments) (Does not meet criteria) (02/05/25 1129)  Subcutaneous Fat (Malnutrition): moderate depletion (02/05/25 1129)  Orbital Region (Subcutaneous Fat Loss): moderate depletion  Upper Arm Region (Subcutaneous Fat Loss): mild depletion     Muscle Mass (Malnutrition): mild depletion (02/05/25 1129)  [place] Region (Muscle Loss): mild depletion           Dorsal Hand (Muscle Loss): mild depletion           Fluid Accumulation (Malnutrition): other (see comments) (Not present) (02/05/25 1129)     Hand  Strength, Right (Malnutrition): Unable to assess (02/05/25 1129)  A minimum of two characteristics is recommended for diagnosis of either severe or non-severe malnutrition.    Chart Review    Reason Seen: [id number]    Malnutrition Screening Tool Results   Have you recently lost weight without trying?: No  Have you been eating poorly because of a decreased appetite?: No   MST Score: 0   Diagnosis:   Physical deconditioning 1/27/2025      Moderate malnutrition 1/27/2025      Closed fracture of proximal end of right humerus 1/27/2025      Type 2  diabetes mellitus, with long-term current use of insulin 1/27/2025           Relevant Medical History: No past medical history on file.     Scheduled Medications:  aluminum-magnesium hydroxide-simethicone, 30 mL, QID (AC & HS)  atorvastatin, 20 mg, Daily  enoxparin, 40 mg, Daily  glimepiride, 4 mg, Before breakfast  insulin glargine U-100, 20 Units, QHS  levothyroxine, 100 mcg, Before breakfast  lisinopriL, 5 mg, Daily  memantine, 10 mg, BID  metFORMIN, 500 mg, BID WM  miconazole NITRATE 2 %, , BID    Continuous Infusions:   PRN Medications:  acetaminophen, 650 mg, Q6H PRN  dextrose 50%, 12.5 g, PRN  dextrose 50%, 25 g, PRN  docusate sodium, 100 mg, Daily PRN  glucagon (human recombinant), 1 mg, PRN  glucose, 16 g, PRN  glucose, 24 g, PRN  HYDROcodone-acetaminophen, 1 tablet, Q6H PRN  insulin aspart U-100, 0-10 Units, TID WM PRN  melatonin, 6 mg, Nightly PRN  naloxone, 0.02 mg, PRN  ondansetron, 4 mg, Q8H PRN  sodium chloride 0.9%, 10 mL, Q12H PRN    Calorie Containing IV Medications: no significant kcals from medications at this time    Recent Labs   Lab 01/30/25  0507 01/31/25  0500 02/01/25  0512 02/02/25  0521 02/03/25  0457 02/04/25  0435 02/05/25  0439    138 141 142 141 142 142   K 4.3 4.2 4.0 4.2 4.1 4.2 4.1   CALCIUM 9.7 9.8 9.8 10.1 10.0 10.2 10.9*    100 101 101 102 101 98   CO2 30 28 31 32* 29 33* 29   BUN 27.0* 26.0* 29.0* 33.0* 29.0* 28.0* 29.0*   CREATININE 0.74 0.84 0.84 0.78 0.78 0.89 0.95   EGFRNORACEVR >60 >60 >60 >60 >60 >60 58   GLUCOSE 233* 273* 189* 123* 88 92 147*   BILITOT 0.6 0.7 0.5 0.4 0.5 0.5 0.5   ALKPHOS 74 82 76 78 80 92 113   ALT 13 14 15 13 14 11 14   AST 15 18 17 18 18 19 20   ALBUMIN 3.0* 3.2* 3.0* 3.0* 3.1* 3.2* 3.7   WBC 8.65 8.55 8.56 8.12 8.60 8.41 10.37   HGB 8.4* 9.1* 8.8* 8.9* 9.2* 9.2* 10.3*   HCT 27.7* 29.5* 28.9* 29.6* 30.4* 30.9* 35.1*     Nutrition Orders:  Diet diabetic Easy to Chew (IDDSI Level 7); 2000 Calories (up to 75 gm per meal); Standard  "Tray  Dietary nutrition supplements TID; Boost Glucose Control - Any flavor    Appetite/Oral Intake: fair/50-75% of meals  Factors Affecting Nutritional Intake: none identified  Social Needs Impacting Access to Food: none identified  Food/Samaritan/Cultural Preferences: unable to obtain  Food Allergies: no known food allergies  Last Bowel Movement: 02/04/25  Wound(s):[REMOVED]      Wound 01/27/25 2100 Other (comment) Left lower Arm-Tissue loss description: Not applicable     Comments    1/28/25: Pt w/ decreased appetite and intake x 1 week. No n/v/d/c at this time. Pt was only eating light foods/soups at [facility] and nursing was assisting pt in chopping food into smaller pieces. Will add easy to chew modifier so meats are more tender. Last BM 2 days ago. No wt loss noted per EMR. Labs and meds reviewed, Glu remains elevated.    1/30/25: Pt reports decreased appetite and intake. States she didn't think she could have rice and carbs and she was surprised to see them on her plate. Says it's a lot of food. Reports some nausea yesterday. Pt has been drinking the Boost and liking them. Last BM 2 days ago.    2/4/25: Pt appetite and intake have started to improve. Pt did throw up some bile yesterday 2/2 reflux but reported that this wasn't unusual for her. Last BM today. Alb trending up at 3.2.    2/5/25: Spoke w/ pt daughter during rounds this am. Pt w/ fair po intake, has been drinking Boost. Last BM yesterday, no n/v/d/c at this time. Plan for d/c Friday. Alb currently at 3.7.     Anthropometrics    Height: 5' 5" (165.1 cm), Height Method: Stated  Last Weight: 66.1 kg (145 lb 11.6 oz) (02/02/25 0500), Weight Method: Bed Scale  BMI (Calculated): 24.2  BMI Classification: overweight (BMI 25-29.9)     Ideal Body Weight (IBW), Female: 125 lb     % Ideal Body Weight, Female (lb): 118.87 %                             Usual Weight Provided By: EMR weight history    Wt Readings from Last 5 Encounters:   02/02/25 66.1 kg (145 lb 11.6 " oz)   01/27/25 67.5 kg (148 lb 13 oz)   09/30/19 70.5 kg (155 lb 6.8 oz)     Weight Change(s) Since Admission: overall stable; fluctuating  Wt Readings from Last 1 Encounters:   02/02/25 0500 66.1 kg (145 lb 11.6 oz)   01/28/25 0500 69 kg (152 lb 1.9 oz)   01/27/25 2100 67.4 kg (148 lb 9.4 oz)   Admit Weight: 67.4 kg (148 lb 9.4 oz) (01/27/25 2100), Weight Method: Bed Scale    Estimated Needs    Weight Used For Calorie Calculations: 69 kg (152 lb 1.9 oz)  Energy Calorie Requirements (kcal): 4349-7005 kcal (25-30 kcal/kg)  Energy Need Method: Kcal/kg  Weight Used For Protein Calculations: 69 kg (152 lb 1.9 oz)  Protein Requirements: 69-83 g (1.0-1.2 g/kg)  Fluid Requirements (mL): 2070 mL/d (30 mL/kg)  CHO Requirement: 228 g/d (45% of EER)     Enteral Nutrition     Patient not receiving enteral nutrition at this time.    Parenteral Nutrition     Patient not receiving parenteral nutrition support at this time.    Evaluation of Received Nutrient Intake    Calories: meeting estimated needs  Protein: meeting estimated needs    Patient Education     Not applicable.    Nutrition Diagnosis     PES: Inadequate oral intake related to acute illness as evidenced by <50% energy intake x >5 days. (resolved)     PES: Moderate acute disease or injury related malnutrition Related to acute illness/injury As Evidenced by:  - energy intake: <= 50% for 5 days (meets criteria for <= 50% >= 5 days (severe - acute)) - muscle mass depletion: 2 areas of mild muscle loss (Interosseous, Temporalis) - loss of subcutaneous fat: 1 area of moderate fat loss (Infraorbital), 1 area of mild fat loss (Triceps Skinfold) active    Nutrition Interventions     Intervention(s): general/healthful diet, modified composition of meals/snacks, commercial beverage, multivitamin/mineral supplement therapy, prescription medication, and collaboration with other providers  Intervention(s): Oral nutritional supplement;Feeding assistance/management;Oral diet/nutrient  modifications;Care coordination or referral    Goal: Meet greater than 80% of nutritional needs by follow-up. (goal progressing)  Goal: Maintain weight throughout hospitalization. (goal met)    Nutrition Goals & Monitoring     Dietitian will monitor: food and beverage intake, weight, electrolyte/renal panel, glucose/endocrine profile, and gastrointestinal profile  Discharge planning: resume home regimen  Nutrition Risk/Follow-Up: moderate (follow-up in 3-5 days)   Please consult if re-assessment needed sooner.

## 2025-02-05 NOTE — PLAN OF CARE
02/05/25 1407   Discharge Reassessment   Assessment Type Discharge Planning Brief Assessment   Did the patient's condition or plan change since previous assessment? No   Discharge Plan discussed with: Adult children;Patient   Communicated ISATU with patient/caregiver Yes   Discharge Plan A Skilled Nursing Facility   Discharge Plan B Home with family;Home Health   DME Needed Upon Discharge  none   Transition of Care Barriers No family/friends to help   Why the patient remains in the hospital Requires continued medical care   Post-Acute Status   Post-Acute Authorization Placement   Post-Acute Placement Status Referrals Sent   Coverage [facility] Resources/Appts/Education Provided Provided education on problems/symptoms using teachback   Discharge Delays (!) Patient and Family Barriers

## 2025-02-05 NOTE — PLAN OF CARE
Weekly Staffing Report      Date Admitted: 1/27/2025 :   Staffing Date: 2/5/2025     Patient Active Problem List   Diagnosis    Orthostatic hypotension    Moderate malnutrition    Physical deconditioning    Closed fracture of proximal end of right humerus    Type 2 diabetes mellitus, with long-term current use of insulin    GERD (gastroesophageal reflux disease)          Team Members Present:       Nursing Present     Yes []    No [x]    Physical Therapy Present    Yes [x]    No []    Occupational Therapy Present     Yes [x]    No []    Speech Therapy Present    Yes [x]    No []    NA []    Dietary Present    Yes [x]    No []        Physician Present   [x] Dr. Noble    [] Dr. Rapp       Family Present    [x] Adult Children via phone    [] Spouse    [] POA    [] Friend/ Caregiver    [] Other       Interdisciplinary Meeting Notes:  Spoke with patient and daughter about discharge plan. Needs 24hour care. I am working with daughter and [employer] for options. Referrals sent to SNF per patient and daughter's request.         Please see Documented PT/OT/ST, Dietary, Nursing, and Physician notes for detailed treatment information.

## 2025-02-05 NOTE — PT/OT/SLP PROGRESS
"Speech Language Pathology Treatment    Patient Name:  Brittany Nunn   MRN:  [id number]  Admitting Diagnosis: Physical deconditioning    Recommendations:                      General Recommendations:  Cognitive-linguistic therapy/ dementia education/training  Diet recommendations:  Easy to Chew Diet - IDDSI Level 7, Thin liquids - IDDSI Level 0   Aspiration Precautions:  n/a    General Precautions: Standard, fall (confusion / dementia)  Communication strategies:   reduce distractions, ensure Pt's hearing aides are in and on, allow Pt to see speakers mouth    Assessment:     Brittany Nunn is a 89 y.o. 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  She presents with reduced awareness and insight for adequate problem solving/reasoning, reduced receptive and expressive language skills at mod-complex levels. Improved insight and awareness with education.     Subjective     Pt participated well throughout. Pt appeared in good spirits and reported "I am determined to stay positive" despite difficult conversations re: need for 24 hour care.     Patient goals: "get home"      Pain/Comfort:   No mild pain reported in hip, Pt given rest breaks as needed    Respiratory Status: Room air    Objective:     Pt called her daughter, Shabnam, who participated in family education via speaker phone. SLP reviewed MOCA and results, link to ADLs, IADLs, and presentation within prior sessions discussed in depth.  Medication management abilities noted in past sessions as well as rec for future - supervision required - completed in depth with examples provided. Areas of weakness discussed and family was able to complete teach back of information via phone.     Further education re: Alzheimer's vs dementia and progression of normal vs abnormal age related cognitive deficits reviewed. Pt and family thanked SLP many times during session and demonstrated good " understanding. Shabnam reported need to speak with  (SLP provided phone number) and her other family members re: d/c plans. General questions re: insurance and long term care answered.     Case Conference with Pt, Pt's daughter (Shabnam, via speaker phone), SLP, PT, OT, MD, , and Dietician completed prior to this session. Options for discharge reviewed. Pt's family reports they need time to plan for change in Pt's functional status post-fall, as ability to complete ADLs has been grossly impacted and Pt has cognitive deficits.       Has the patient been evaluated by SLP for swallowing?   No  Keep patient NPO? No     Goals:   Multidisciplinary Problems       SLP Goals          Problem: SLP    Goal Priority Disciplines Outcome   SLP Goal     SLP Progressing   Description: LTGs:  Pt will demonstrate improved recall and use of strategies for improved communication, safety, and participation in ADLs.    STGs:  Pt/family will participate in dementia education/training and complete teach-back for improved communication and strategy use to increase safety and participation in ADLs.   Pt will demonstrate recall and use of strategies with min cues for improved communication, safety, and participation in ADLs.                        Plan:   Con't POC.   Patient to be seen:   (3-5x/week)   Plan of Care expires:     Plan of Care reviewed with:  patient, daughter   SLP Follow-Up:  Yes       Discharge recommendations:    home with  care or nursing home  Barriers to Discharge:  Level of Skilled Assistance Needed   and Safety Awareness      Time Tracking:     SLP Treatment Date:   02/05/25  Speech Start Time:  1100  Speech Stop Time:  1146     Speech Total Time (min):  46 min    Billable Minutes: cog 46 min / 3 units    Bebe Pappas MA, CCC-SLP  02/05/2025

## 2025-02-05 NOTE — PROGRESS NOTES
[facility] Medical Surgical Unit  [facility] Medicine  Progress Note    Patient Name: Brittany Nunn  MRN: [id number]  Patient Class: IP- Swing   Admission Date: 1/27/2025  Length of Stay: 9 days  Attending Physician: Jordon Noble MD  Primary Care Provider: Ester, Primary Doctor        Subjective     Principal Problem:Physical deconditioning        HPI:  88 yo female admitted to swing bed today for continued therapy.  She is s/p a fall at home in which she tripped and fell on her right shoulder.  No LOC. She was sent to [facility] where she was found to have an impacted humeral head/neck fracture with some mild subluxation.  This was determined to be non-surgical.  She does c/o pain to her shoulder otherwise she stats she is calm.  Currently no N/V/D/C.  No fever/chills.  NO chest pian/SOB.  She will be admitted for continued PT/OT services and pain control.    Overview/Hospital Course:  1/28/25: Pt was in the process of changing rooms when I saw her this morning. She was nauseous due to her right arm pain. She had vomited a small amount of bile. She denied any cp or sob. I spoke to her daughter this afternoon. She lives in [place]. There is a brother that has passed away, and another brother that lives in [place]. She states that they have been noticing a decline of their parents over the last year and are preparing for the fact that their living situation likely needs to change. She is available by phone if we have any questions. She states that the pt was using a walker, possibly daily, for ambulation. They were eating microwavable foods, mostly. For groceries they do grocery . Also states that since her TIA, the pt has had some urinary incontinence, requiring the use of some type of padding/diaper.    1/29/25: PT sitting up in her recliner during multidisciplinary rounds meeting. Daughter was on the phone. OT/ST is recommending 24 hour care for safety and ADLs. Daughter understands and will start  "working on this. Pt denies sob, cp, abd pain, constipation. Last bm yesterday. Normally has a bm oqd. She is only complaining of right arm pain.     1/30/25: Pt sitting up in her recliner. She asked me to open her diet coke. She said she cannot use her right hand at all to help her open it. Also said that her left hand isn't strong enough to pop open the tab. I pointed out that even these simple tasks are difficult for her now. She spoke to her daughter on the phone earlier this morning. I asked if they had started discussing a plan for care once she is discharged. She said she plans to stay here until her insurance "runs out." I asked what the plan was when she goes home. She said that they were looking at finding a family member that could come in for breakfast and then again around lunch. I reminded her that we are recommending 24 hour care. She said they did not discuss that. I recommended they do so. Reminded her of the options her daughter had mentioned yesterday of either a NH or for the pt to go and live with on the children. Pt said that "maybe someone could drive me to [place] to stay with my daughter-in-law, but she has 2 teenagers." I encouraged her to discuss this further with her daughter. No complaints at this time.    1/31/25: Pt sitting up in her recliner. No complaints this morning. CBG is 450s. Scheduled 5 units given. Post prandial, remains >400. Pt had pancakes for breakfast with syrup on the diabetic diet.  Pt informed the nurse that at home she takes metformin and glimepiride. These were not listed on her home meds upon admission. I will restart these.     2/1/25: Pt sitting up in her recliner. States that her  is coming to visit her today. She is happy about this. Her grand daughter is bringing him. She is having bm's more than usual. I will change the docusate to prn. No other complaints.    2/2/25: Pt lying in bed. She had a nice visit with her  yesterday. He is in a wheel " chair. She laughed and said he asked if he could stay the night with her in the hospital. She has no complaints this morning.     2/3/25: Pt was sitting up in her recliner this morning. She was vomiting bile earlier. She states it is due to her GERD and that this happens at home. Pt's insurance review is today. We are expecting pt will be ready for dc by the end of the week. CM is communicating with family and encouraging a long term plan for 24 hr care. Awaiting their decision.    2/4/25-No changes today.  She is doing well at this time.  D/C later this week.      2/5/25-No new issues today.  Swing bed meeting today with patient and daughter on phone.  Likely d/c on Friday.    Interval History:     Review of Systems   Constitutional:  Positive for activity change and fatigue.   HENT: Negative.     Eyes: Negative.    Respiratory: Negative.     Cardiovascular: Negative.    Gastrointestinal: Negative.    Endocrine: Negative.    Genitourinary: Negative.    Musculoskeletal:  Positive for arthralgias.   Skin: Negative.    Allergic/Immunologic: Negative.    Neurological: Negative.    Hematological: Negative.    Psychiatric/Behavioral: Negative.       Objective:     Vital Signs (Most Recent):  Temp: 97.6 °F (36.4 °C) (02/05/25 0758)  Pulse: 95 (02/05/25 0758)  Resp: 18 (02/05/25 0758)  BP: (!) 95/59 (02/05/25 0758)  SpO2: (!) 91 % (02/05/25 0758) Vital Signs (24h Range):  Temp:  [97.6 °F (36.4 °C)-98.1 °F (36.7 °C)] 97.6 °F (36.4 °C)  Pulse:  [] 95  Resp:  [18-20] 18  SpO2:  [91 %-99 %] 91 %  BP: ()/(54-63) 95/59     Weight: 66.1 kg (145 lb 11.6 oz)  Body mass index is 24.25 kg/m².    Intake/Output Summary (Last 24 hours) at 2/5/2025 1216  Last data filed at 2/5/2025 0800  Gross per 24 hour   Intake 900 ml   Output --   Net 900 ml         Physical Exam  Constitutional:       Appearance: Normal appearance. She is normal weight.   HENT:      Head: Normocephalic and atraumatic.      Nose: Nose normal.       "Mouth/Throat:      Mouth: Mucous membranes are moist.      Pharynx: Oropharynx is clear.   Eyes:      Extraocular Movements: Extraocular movements intact.      Conjunctiva/sclera: Conjunctivae normal.      Pupils: Pupils are equal, round, and reactive to light.   Cardiovascular:      Rate and Rhythm: Normal rate and regular rhythm.      Pulses: Normal pulses.      Heart sounds: Normal heart sounds.   Pulmonary:      Effort: Pulmonary effort is normal.      Breath sounds: Normal breath sounds.   Abdominal:      General: Bowel sounds are normal.      Palpations: Abdomen is soft.   Musculoskeletal:         General: Tenderness present. Normal range of motion.      Cervical back: Normal range of motion and neck supple.   Skin:     General: Skin is warm and dry.      Capillary Refill: Capillary refill takes 2 to 3 seconds.   Neurological:      General: No focal deficit present.      Mental Status: She is alert and oriented to person, place, and time. Mental status is at baseline.   Psychiatric:         Mood and Affect: Mood normal.         Behavior: Behavior normal.         Thought Content: Thought content normal.         Judgment: Judgment normal.             Significant Labs: All pertinent labs within the past 24 hours have been reviewed.  BMP:   Recent Labs   Lab 02/05/25 0439      K 4.1   CL 98   CO2 29   BUN 29.0*   CREATININE 0.95   CALCIUM 10.9*     CBC:   Recent Labs   Lab 02/04/25 0435 02/05/25 0439   WBC 8.41 10.37   HGB 9.2* 10.3*   HCT 30.9* 35.1*    428*     CMP:   Recent Labs   Lab 02/04/25 0435 02/05/25 0439    142   K 4.2 4.1    98   CO2 33* 29   BUN 28.0* 29.0*   CREATININE 0.89 0.95   CALCIUM 10.2 10.9*   ALBUMIN 3.2* 3.7   BILITOT 0.5 0.5   ALKPHOS 92 113   AST 19 20   ALT 11 14     Magnesium: No results for input(s): "MG" in the last 48 hours.    Significant Imaging: I have reviewed all pertinent imaging results/findings within the past 24 hours.    Assessment and Plan "     * Physical deconditioning  Admit to swing bed  OOB  PT/OT  Resume transfer meds  DVT prophylaxis  Follow labs      GERD (gastroesophageal reflux disease)  Pt has had multiple episodes of vomiting bile in the mornings. She should have further outpt workup with her PCP/GI to address this.       Type 2 diabetes mellitus, with long-term current use of insulin  Resume lantus and aspart 5 units tid.  1/29/25: Increase lantus to 20units. CBGs >400. Continue to monitor.  1/30/25: Increase lantus to 22units.  this morning. Continue to monitor.  1/31/25: Pt states that she also takes metformin and glimepiride at home. These meds were added to her home med list and I am restarting them. I will decrease glargine back to home dose for tonight and re-evaluate in the morning. moderate sliding scale in place of scheduled aspart, as pt was requiring over her 5 units. Will monitor, may need to change to low.    Closed fracture of proximal end of right humerus  PT/OT  Recs as per ortho      Moderate malnutrition  Nutrition consulted. Most recent weight and BMI monitored-     Measurements:  Wt Readings from Last 1 Encounters:   02/02/25 66.1 kg (145 lb 11.6 oz)   Body mass index is 24.25 kg/m².    Patient has been screened and assessed by RD.    Malnutrition Type:  Context: acute illness or injury  Level: moderate    Malnutrition Characteristic Summary:  Weight Loss (Malnutrition): other (see comments) (Does not meet criteria)  Energy Intake (Malnutrition): less than or equal to 50% for greater than or equal to 5 days  Subcutaneous Fat (Malnutrition): mild depletion  Muscle Mass (Malnutrition): mild depletion  Fluid Accumulation (Malnutrition): other (see comments) (Not present)  Hand  Strength, Right (Malnutrition): Measurably reduced for age/gender    Interventions/Recommendations (treatment strategy):  Oral nutritional supplement;Care coordination or referral;Feeding assistance/management;Oral diet/nutrient  modifications        VTE Risk Mitigation (From admission, onward)           Ordered     enoxaparin injection 40 mg  Daily         01/27/25 3283                  DVT prophylaxis  Labs as needed  Therapy  OOB  Pain control      Discharge Planning   ISATU:      Code Status: Full Code   Medical Readiness for Discharge Date:   Discharge Plan A: Home with family, Home Health   Discharge Delays: None known at this time            Please place Justification for DME        Jordon Noble MD  Department of Hospital Medicine   [facility] - Medical Surgical Unit

## 2025-02-05 NOTE — PT/OT/SLP PROGRESS
Occupational Therapy  Treatment    Name: Brittany Nunn    : 1935 (89 y.o.)  MRN: [id number]          TREATMENT SUMMARY AND RECOMMENDATIONS:      Subjective Assessment:   No complaints  Lethargic   x Awake, alert, cooperative  Uncooperative    Agitated x Flat affect    Appropriate  c/o fatigue   x Confused  Treated at bedside     Emotionally liable  Treated in gym/dept.    Impulsive x Other: Pt reported right shoulder pain has decreased daily.       Pain/Comfort:  Pain Rating 1: 3/10  Location - Side 1: Right  Location 1: shoulder  Pain Addressed 1: Reposition, Distraction      Therapy Precautions:   x Cognitive deficits  Spinal precautions    Collar - hard  Sternal precautions    Collar - soft   TLSO   x Fall risk  LSO    Hip precautions - posterior  Knee immobilizer    Hip precautions - anterior  WBAT    Impaired communication  Partial weightbearing    Oxygen  TTWB    PEG tube RUE NWB    Visual deficits x Other:Sling   x Hearing deficits           Vitals Value   x Room air      Oxygen (L)     Blood pressure     Heart rate         Treatment Objectives:     Mobility Training:    Mobility task Assist level Comments    Bed mobility     Balance training CGA Dynamic standing bal/moy during ADL grooming at [facility].  pt able to tolerate standing 1.5 mins CGA while performing hair grooming activity with LUE.   Transfer CGA Transfer training wc>in/out walk-in shower to TTB>WC>recliner HHA and verbal cuing to sequence and proper hand placement.   Functional mobility CGA Functional mobility into/out of shower room 8 ft HHA.     Sit to stand transitions CGA Transfer training  sit to stand from multiple surfaces.  Pt able to transfer with CGA from wc and TTB using grab bar.   Other:       ADL Training:    ADL Assist level Comments    Feeding     Grooming/hygiene Setup ADL grooming activity performed at sink.  While sitting in wc, pt able to perform oral care SBA.  pt stood at mirror CGA while performing hair grooming with  LUE.   Bathing Mod ADL bathing training performed with shower activity sitting on TTB using HH shower. After setup, pt able to wet body, bathe/dry trunk, face, and bilateral thighs, and perineal area and bottom while standing.  Assist required with remaining.     Upper body dressing Max Pt required assist to doff/don hospital gown and RUE sling.   Lower body dressing Max Pt required assistance to doff/don pull-up and bilateral slipper socks.   Toileting     Toilet transfer     Adaptive equipment training     Other:         Additional Treatment:  Reinforced call bell function and call before you fall.    Assessment: Patient tolerated session fair.  Noted performing functional transfers with HHA and verbal cues for sequencing/safety.  Pt has increased showering activity to moderate assist, but conts to require max assist with dressing activities.  Cont to recommend 24-hour care.  Pt will benefit from cont PLC to address deficits with goal of achieving max functional abilities.     OT Plan: OT to focus next session on functional transfers, ADL training, and education on HEP.      GOALS:   Multidisciplinary Problems       Occupational Therapy Goals          Problem: Occupational Therapy    Goal Priority Disciplines Outcome Interventions   Occupational Therapy Goal     OT, PT/OT Progressing    Description:   Patient will increase functional independence with ADLs by performing:    LE Dressing with Minimal Assistance.(Progressing)  Toileting from bedside commode with Contact Guard Assistance for hygiene and clothing management. (Progressing)  Bathing from  shower chair/bench with Minimal Assistance.(Progressing)  Toilet transfer to bedside commode with Contact Guard Assistance. (Goal Met)                         Communication with Treatment Team:     Discharge Recommendations:  24-hour care.  Discharge Equipment Recommendations:   (TBD)  Barriers to discharge:  Decreased caregiver support      At end of treatment, patient  remained:  x Up in chair     In bed   x With alarm activated    Bed rails up   x Call bell in reach     Family/friends present    Restraints secured properly    In bathroom with CNA/RN notified    In gym with PT/PTA/tech   x Nurse aware    Other:         OT Date of Treatment: 02/05/25  OT Start Time: 0830  OT Stop Time: 0915  OT Total Time (min): 45 min    Billable Minutes:Self Care/Home Management 30  Therapeutic Activity 15      2/5/2025

## 2025-02-05 NOTE — PLAN OF CARE
Referral sent to [facility], [facility], and [facility] for continued Skilled nursing care. Spoke with patient's daughter who provided the names of facilities. Also spoke with [facility] who advised this would be the best way to extend SNF days if they can get it approved. Relayed this information to daughter Shabnam.

## 2025-02-05 NOTE — PLAN OF CARE
Problem: Adult Inpatient Plan of Care  Goal: Plan of Care Review  Outcome: Met  Goal: Patient-Specific Goal (Individualized)  Outcome: Met  Goal: Absence of Hospital-Acquired Illness or Injury  Outcome: Met  Goal: Optimal Comfort and Wellbeing  Outcome: Met  Goal: Readiness for Transition of Care  Outcome: Met     Problem: Diabetes Comorbidity  Goal: Blood Glucose Level Within Targeted Range  Outcome: Met     Problem: Skin Injury Risk Increased  Goal: Skin Health and Integrity  Outcome: Met     Problem: Fall Injury Risk  Goal: Absence of Fall and Fall-Related Injury  Outcome: Met     Problem: Wound  Goal: Optimal Coping  Outcome: Met  Goal: Optimal Functional Ability  Outcome: Met  Goal: Absence of Infection Signs and Symptoms  Outcome: Met  Goal: Improved Oral Intake  Outcome: Met  Goal: Optimal Pain Control and Function  Outcome: Met  Goal: Skin Health and Integrity  Outcome: Met  Goal: Optimal Wound Healing  Outcome: Met     Problem: Pain Acute  Goal: Optimal Pain Control and Function  Outcome: Met

## 2025-02-05 NOTE — PT/OT/SLP PROGRESS
"         Physical Therapy Treatment Note           Name: Brittany Nunn    : 1935 (89 y.o.)  MRN: [id number]             Subjective Assessment:     No complaints  Lethargic   X Awake, alert, cooperative  Uncooperative    Agitated  c/o pain    Appropriate  c/o fatigue   X Confused  Treated at bedside     Emotionally labile  Treated in gym/dept.    Impulsive  Other:    Flat affect       Pain/Comfort:    Location - Side 1: Right  Location 1: shoulder    Therapy Precautions:     X Cognitive deficits  Spinal precautions    Collar - hard  Sternal precautions    Collar - soft   TLSO   X Fall risk  LSO    Hip precautions - posterior  Knee immobilizer    Hip precautions - anterior  WBAT    Impaired communication  Partial weightbearing    Oxygen  TTWB    PEG tube X NWB RUE in sling     Visual deficits  Other:    Hearing deficits               Mobility Training:     Assist Level Assistive Device Comments    Bed Mobility Sup  Semi-supine to sitting at EOB.  Bed railing utilized for assistance.     Sit to stand/Stand to sit Sup SBQC Sit to stand/stand to sit. Pt stood from EOB and returned to sitting in the WC.  Reminders provided for hand placement during descent.    Transfers      Gait Sup-CGA SBQC Pt able to ambulate 200 ft with a step through gait pattern and slow but steady gait speed. Some difficulty observed with sequencing of the SBQC initially, but this improved as gait progressed.  No significant LOB noted, but moments of mild unsteadiness were observed.     Balance Training      Wheelchair Mobility      Stair Climbing      Car Transfer      Other:             Assessment:     Patient tolerated session fairly well and reported feeling "better than yesterday". Pt does continue to experience symptoms of GERD and vomited a small amount into the emesis bag prior to OOB mobility.  As previously documented, pt's insurance has approved continued therapy only through the end of the week.  PT is recommending D/C home " with 24 hour care and HH services.  Will discuss this with family during today's interdisciplinary meeting.  .      GOALS:   Multidisciplinary Problems       Physical Therapy Goals          Problem: Physical Therapy    Goal Priority Disciplines Outcome Interventions   Physical Therapy Goal     PT, PT/OT Progressing    Description: Patient will increase functional independence with mobility by performin. Supine to sit with Stand-by Assistance  2. Sit to supine with Stand-by Assistance  3. Sit to stand transfer with Stand-by Assistance  4. Gait  x 75 feet with Stand-by Assistance using No Assistive Device vs SQBC.                            Discharge Recommendations:      Home with 24 hour care and HH services    Discharge Equipment Recommendations:      (TBD)     At end of treatment, patient remained:    X  Up in chair     In bed    With alarm activated    Bed rails up   X Call bell in reach      Family/friends present     Restraints secured properly     In bathroom with CNA/RN notified     In gym with PT/PTA/tech     Nurse aware     Other:           PT Start Time: 813     PT Stop Time: 830  PT Total Time (min): 17 min     Billable Minutes: Gait Training 2025

## 2025-02-05 NOTE — SUBJECTIVE & OBJECTIVE
Interval History:     Review of Systems   Constitutional:  Positive for activity change and fatigue.   HENT: Negative.     Eyes: Negative.    Respiratory: Negative.     Cardiovascular: Negative.    Gastrointestinal: Negative.    Endocrine: Negative.    Genitourinary: Negative.    Musculoskeletal:  Positive for arthralgias.   Skin: Negative.    Allergic/Immunologic: Negative.    Neurological: Negative.    Hematological: Negative.    Psychiatric/Behavioral: Negative.       Objective:     Vital Signs (Most Recent):  Temp: 97.6 °F (36.4 °C) (02/05/25 0758)  Pulse: 95 (02/05/25 0758)  Resp: 18 (02/05/25 0758)  BP: (!) 95/59 (02/05/25 0758)  SpO2: (!) 91 % (02/05/25 0758) Vital Signs (24h Range):  Temp:  [97.6 °F (36.4 °C)-98.1 °F (36.7 °C)] 97.6 °F (36.4 °C)  Pulse:  [] 95  Resp:  [18-20] 18  SpO2:  [91 %-99 %] 91 %  BP: ()/(54-63) 95/59     Weight: 66.1 kg (145 lb 11.6 oz)  Body mass index is 24.25 kg/m².    Intake/Output Summary (Last 24 hours) at 2/5/2025 1216  Last data filed at 2/5/2025 0800  Gross per 24 hour   Intake 900 ml   Output --   Net 900 ml         Physical Exam  Constitutional:       Appearance: Normal appearance. She is normal weight.   HENT:      Head: Normocephalic and atraumatic.      Nose: Nose normal.      Mouth/Throat:      Mouth: Mucous membranes are moist.      Pharynx: Oropharynx is clear.   Eyes:      Extraocular Movements: Extraocular movements intact.      Conjunctiva/sclera: Conjunctivae normal.      Pupils: Pupils are equal, round, and reactive to light.   Cardiovascular:      Rate and Rhythm: Normal rate and regular rhythm.      Pulses: Normal pulses.      Heart sounds: Normal heart sounds.   Pulmonary:      Effort: Pulmonary effort is normal.      Breath sounds: Normal breath sounds.   Abdominal:      General: Bowel sounds are normal.      Palpations: Abdomen is soft.   Musculoskeletal:         General: Tenderness present. Normal range of motion.      Cervical back: Normal  "range of motion and neck supple.   Skin:     General: Skin is warm and dry.      Capillary Refill: Capillary refill takes 2 to 3 seconds.   Neurological:      General: No focal deficit present.      Mental Status: She is alert and oriented to person, place, and time. Mental status is at baseline.   Psychiatric:         Mood and Affect: Mood normal.         Behavior: Behavior normal.         Thought Content: Thought content normal.         Judgment: Judgment normal.             Significant Labs: All pertinent labs within the past 24 hours have been reviewed.  BMP:   Recent Labs   Lab 02/05/25 0439      K 4.1   CL 98   CO2 29   BUN 29.0*   CREATININE 0.95   CALCIUM 10.9*     CBC:   Recent Labs   Lab 02/04/25 0435 02/05/25 0439   WBC 8.41 10.37   HGB 9.2* 10.3*   HCT 30.9* 35.1*    428*     CMP:   Recent Labs   Lab 02/04/25 0435 02/05/25 0439    142   K 4.2 4.1    98   CO2 33* 29   BUN 28.0* 29.0*   CREATININE 0.89 0.95   CALCIUM 10.2 10.9*   ALBUMIN 3.2* 3.7   BILITOT 0.5 0.5   ALKPHOS 92 113   AST 19 20   ALT 11 14     Magnesium: No results for input(s): "MG" in the last 48 hours.    Significant Imaging: I have reviewed all pertinent imaging results/findings within the past 24 hours.  "

## 2025-02-06 LAB
ALBUMIN SERPL-MCNC: 3.4 G/DL (ref 3.4–4.8)
ALBUMIN/GLOB SERPL: 0.9 RATIO (ref 1.1–2)
ALP SERPL-CCNC: 103 UNIT/L (ref 40–150)
ALT SERPL-CCNC: 14 UNIT/L (ref 0–55)
ANION GAP SERPL CALC-SCNC: 9 MEQ/L
AST SERPL-CCNC: 20 UNIT/L (ref 5–34)
BASOPHILS # BLD AUTO: 0.08 X10(3)/MCL
BASOPHILS NFR BLD AUTO: 1 %
BILIRUB SERPL-MCNC: 0.5 MG/DL
BUN SERPL-MCNC: 28 MG/DL (ref 9.8–20.1)
CALCIUM SERPL-MCNC: 10.2 MG/DL (ref 8.4–10.2)
CHLORIDE SERPL-SCNC: 103 MMOL/L (ref 98–107)
CO2 SERPL-SCNC: 29 MMOL/L (ref 23–31)
CREAT SERPL-MCNC: 0.78 MG/DL (ref 0.55–1.02)
CREAT/UREA NIT SERPL: 36
EOSINOPHIL # BLD AUTO: 0.31 X10(3)/MCL (ref 0–0.9)
EOSINOPHIL NFR BLD AUTO: 3.8 %
ERYTHROCYTE [DISTWIDTH] IN BLOOD BY AUTOMATED COUNT: 20.5 % (ref 11.5–17)
GFR SERPLBLD CREATININE-BSD FMLA CKD-EPI: >60 ML/MIN/1.73/M2
GLOBULIN SER-MCNC: 3.8 GM/DL (ref 2.4–3.5)
GLUCOSE SERPL-MCNC: 97 MG/DL (ref 82–115)
HCT VFR BLD AUTO: 32.7 % (ref 37–47)
HGB BLD-MCNC: 9.9 G/DL (ref 12–16)
IMM GRANULOCYTES # BLD AUTO: 0.04 X10(3)/MCL (ref 0–0.04)
IMM GRANULOCYTES NFR BLD AUTO: 0.5 %
LYMPHOCYTES # BLD AUTO: 2.95 X10(3)/MCL (ref 0.6–4.6)
LYMPHOCYTES NFR BLD AUTO: 36.5 %
MCH RBC QN AUTO: 25.5 PG (ref 27–31)
MCHC RBC AUTO-ENTMCNC: 30.3 G/DL (ref 33–36)
MCV RBC AUTO: 84.3 FL (ref 80–94)
MONOCYTES # BLD AUTO: 0.5 X10(3)/MCL (ref 0.1–1.3)
MONOCYTES NFR BLD AUTO: 6.2 %
NEUTROPHILS # BLD AUTO: 4.21 X10(3)/MCL (ref 2.1–9.2)
NEUTROPHILS NFR BLD AUTO: 52 %
NRBC BLD AUTO-RTO: 0 %
PLATELET # BLD AUTO: 346 X10(3)/MCL (ref 130–400)
PMV BLD AUTO: 8.7 FL (ref 7.4–10.4)
POCT GLUCOSE: 105 MG/DL (ref 70–110)
POCT GLUCOSE: 226 MG/DL (ref 70–110)
POCT GLUCOSE: 230 MG/DL (ref 70–110)
POCT GLUCOSE: 76 MG/DL (ref 70–110)
POCT GLUCOSE: 94 MG/DL (ref 70–110)
POTASSIUM SERPL-SCNC: 4.4 MMOL/L (ref 3.5–5.1)
PROT SERPL-MCNC: 7.2 GM/DL (ref 5.8–7.6)
RBC # BLD AUTO: 3.88 X10(6)/MCL (ref 4.2–5.4)
SODIUM SERPL-SCNC: 141 MMOL/L (ref 136–145)
WBC # BLD AUTO: 8.09 X10(3)/MCL (ref 4.5–11.5)

## 2025-02-06 PROCEDURE — 36415 COLL VENOUS BLD VENIPUNCTURE: CPT | Performed by: INTERNAL MEDICINE

## 2025-02-06 PROCEDURE — 85025 COMPLETE CBC W/AUTO DIFF WBC: CPT | Performed by: INTERNAL MEDICINE

## 2025-02-06 PROCEDURE — 25000003 PHARM REV CODE 250: Performed by: INTERNAL MEDICINE

## 2025-02-06 PROCEDURE — 63600175 PHARM REV CODE 636 W HCPCS: Performed by: INTERNAL MEDICINE

## 2025-02-06 PROCEDURE — 97116 GAIT TRAINING THERAPY: CPT

## 2025-02-06 PROCEDURE — 97530 THERAPEUTIC ACTIVITIES: CPT

## 2025-02-06 PROCEDURE — 80053 COMPREHEN METABOLIC PANEL: CPT | Performed by: INTERNAL MEDICINE

## 2025-02-06 PROCEDURE — 11000004 HC SNF PRIVATE

## 2025-02-06 RX ADMIN — ALUMINUM HYDROXIDE, MAGNESIUM HYDROXIDE, AND DIMETHICONE 30 ML: 200; 20; 200 SUSPENSION ORAL at 11:02

## 2025-02-06 RX ADMIN — MEMANTINE 10 MG: 5 TABLET ORAL at 08:02

## 2025-02-06 RX ADMIN — LISINOPRIL 5 MG: 5 TABLET ORAL at 08:02

## 2025-02-06 RX ADMIN — ALUMINUM HYDROXIDE, MAGNESIUM HYDROXIDE, AND DIMETHICONE 30 ML: 200; 20; 200 SUSPENSION ORAL at 08:02

## 2025-02-06 RX ADMIN — METFORMIN HYDROCHLORIDE 500 MG: 500 TABLET ORAL at 05:02

## 2025-02-06 RX ADMIN — MICONAZOLE NITRATE: 20 POWDER TOPICAL at 08:02

## 2025-02-06 RX ADMIN — INSULIN ASPART 4 UNITS: 100 INJECTION, SOLUTION INTRAVENOUS; SUBCUTANEOUS at 11:02

## 2025-02-06 RX ADMIN — ENOXAPARIN SODIUM 40 MG: 40 INJECTION SUBCUTANEOUS at 05:02

## 2025-02-06 RX ADMIN — GLIMEPIRIDE 4 MG: 2 TABLET ORAL at 05:02

## 2025-02-06 RX ADMIN — METFORMIN HYDROCHLORIDE 500 MG: 500 TABLET ORAL at 08:02

## 2025-02-06 RX ADMIN — INSULIN ASPART 2 UNITS: 100 INJECTION, SOLUTION INTRAVENOUS; SUBCUTANEOUS at 08:02

## 2025-02-06 RX ADMIN — LEVOTHYROXINE SODIUM 100 MCG: 0.1 TABLET ORAL at 05:02

## 2025-02-06 RX ADMIN — ALUMINUM HYDROXIDE, MAGNESIUM HYDROXIDE, AND DIMETHICONE 30 ML: 200; 20; 200 SUSPENSION ORAL at 06:02

## 2025-02-06 RX ADMIN — ALUMINUM HYDROXIDE, MAGNESIUM HYDROXIDE, AND DIMETHICONE 30 ML: 200; 20; 200 SUSPENSION ORAL at 05:02

## 2025-02-06 RX ADMIN — ATORVASTATIN CALCIUM 20 MG: 10 TABLET, FILM COATED ORAL at 08:02

## 2025-02-06 RX ADMIN — INSULIN GLARGINE 20 UNITS: 100 INJECTION, SOLUTION SUBCUTANEOUS at 08:02

## 2025-02-06 NOTE — PLAN OF CARE
Problem: Adult Inpatient Plan of Care  Goal: Plan of Care Review  Outcome: Progressing  Goal: Patient-Specific Goal (Individualized)  Outcome: Progressing  Goal: Absence of Hospital-Acquired Illness or Injury  Outcome: Progressing  Goal: Optimal Comfort and Wellbeing  Outcome: Progressing  Goal: Readiness for Transition of Care  Outcome: Progressing     Problem: Diabetes Comorbidity  Goal: Blood Glucose Level Within Targeted Range  Outcome: Progressing     Problem: Skin Injury Risk Increased  Goal: Skin Health and Integrity  Outcome: Progressing     Problem: Fall Injury Risk  Goal: Absence of Fall and Fall-Related Injury  Outcome: Progressing     Problem: Wound  Goal: Optimal Coping  Outcome: Progressing  Goal: Optimal Functional Ability  Outcome: Progressing  Goal: Absence of Infection Signs and Symptoms  Outcome: Progressing  Goal: Improved Oral Intake  Outcome: Progressing  Goal: Optimal Pain Control and Function  Outcome: Progressing  Goal: Skin Health and Integrity  Outcome: Progressing  Goal: Optimal Wound Healing  Outcome: Progressing     Problem: Pain Acute  Goal: Optimal Pain Control and Function  Outcome: Progressing     Problem: Orthopaedic Rehabilitation  Goal: Optimal Coping  Outcome: Progressing  Goal: Optimal Safe BADL Performance  Outcome: Progressing  Goal: Effective Bowel Elimination  Outcome: Progressing  Goal: Optimal Safe IADL Performance  Outcome: Progressing  Goal: Absence of Infection Signs/Symptoms  Outcome: Progressing  Goal: Optimal Mobility [place] and Safety  Outcome: Progressing

## 2025-02-06 NOTE — PT/OT/SLP PROGRESS
Occupational Therapy  Treatment    Name: Brittany Nunn    : 1935 (89 y.o.)  MRN: [id number]          TREATMENT SUMMARY AND RECOMMENDATIONS:      Subjective Assessment:   No complaints  Lethargic   x Awake, alert, cooperative  Uncooperative    Agitated  Flat affect    Appropriate  c/o fatigue   x Confused x Treated at bedside     Emotionally liable  Treated in gym/dept.    Impulsive x Other: Pt reported sleeping well last night.       Pain/Comfort:  Pain Rating 1: 4/10  Location - Side 1: Right  Location 1: shoulder  Pain Addressed 1: Reposition, Distraction      Therapy Precautions:   x Cognitive deficits  Spinal precautions    Collar - hard  Sternal precautions    Collar - soft   TLSO   x Fall risk  LSO    Hip precautions - posterior  Knee immobilizer    Hip precautions - anterior  WBAT    Impaired communication  Partial weightbearing    Oxygen  TTWB    PEG tube RUE NWB    Visual deficits x Other:RUE sling.    Hearing deficits           Vitals Value   x Room air      Oxygen (L)     Blood pressure     Heart rate         Treatment Objectives:     Mobility Training:    Mobility task Assist level Comments    Bed mobility     Balance training     Transfer     Functional mobility     Sit to stand transitions     Other:       ADL Training:    ADL Assist level Comments    Feeding Setup Assisted with tray setup, required assist to open containers and packets.     Grooming/hygiene     Bathing     Upper body dressing     Lower body dressing     Toileting     Toilet transfer     Adaptive equipment training     Other:       Additional Treatment:  Educated pt on RUE ROM ex's. Pt educated on hands clasped technique SROM elbow flex/ext then AROM sup/pron, wrist flex/ext, ulnar radial deviation, and hand pumps with foam block.  Pt able to perform with min verbal cues after demonstration,  Handouts provided.    Assessment: Patient tolerated session fair.  Session focused on eduction on RUE ROM ex's to be performed daily.   Pt will require review.  CM working on possible transfer to SNF setting for continued therapy.  Presently pt continues to require 24-hour care.     OT Plan: OT to focus next session on review of RUE ROM ex's, transfer training and ADL training.      GOALS:   Multidisciplinary Problems       Occupational Therapy Goals          Problem: Occupational Therapy    Goal Priority Disciplines Outcome Interventions   Occupational Therapy Goal     OT, PT/OT Progressing    Description:   Patient will increase functional independence with ADLs by performing:    LE Dressing with Minimal Assistance.(Progressing)  Toileting from bedside commode with Contact Guard Assistance for hygiene and clothing management. (Progressing)  Bathing from  shower chair/bench with Minimal Assistance.(Progressing)  Toilet transfer to bedside commode with Contact Guard Assistance. (Goal Met)                         Communication with Treatment Team:     Discharge Recommendations:    Discharge Equipment Recommendations:   (TBD)  Barriers to discharge:  Decreased caregiver support      At end of treatment, patient remained:  x Up in chair     In bed   x With alarm activated    Bed rails up   x Call bell in reach     Family/friends present    Restraints secured properly    In bathroom with CNA/RN notified    In gym with PT/PTA/tech   x Nurse aware    Other:         OT Date of Treatment: 02/06/25  OT Start Time: 0840  OT Stop Time: 0900  OT Total Time (min): 20 min    Billable Minutes:Therapeutic Activity 20      2/6/2025

## 2025-02-06 NOTE — PT/OT/SLP PROGRESS
Physical Therapy Treatment Note           Name: Brittany Nunn    : 1935 (89 y.o.)  MRN: [id number]             Subjective Assessment:     No complaints  Lethargic   X Awake, alert, cooperative  Uncooperative    Agitated X c/o pain    Appropriate X c/o fatigue   X Confused  Treated at bedside     Emotionally labile  Treated in gym/dept.    Impulsive  Other:    Flat affect       Pain/Comfort:    Location - Side 1: Right  Location 1: arm    Therapy Precautions:     X Cognitive deficits  Spinal precautions    Collar - hard  Sternal precautions    Collar - soft   TLSO   X Fall risk  LSO    Hip precautions - posterior  Knee immobilizer    Hip precautions - anterior  WBAT    Impaired communication  Partial weightbearing    Oxygen  TTWB    PEG tube X NWB RUE in sling    Visual deficits  Other:    Hearing deficits             Mobility Training:     Assist Level Assistive Device Comments    Bed Mobility Sup  Sitting EOB to supine   Sit to stand/Stand to sit Sup SBQC Sit to stand/stand to sit. Pt stood from recliner level and returned to sitting at EOB.     Transfers      Gait Sup-CGA SBQC Pt able to ambulate 150 ft with a step through gait pattern and slow but steady gait speed.  Pt was able to participate in conversation throughout without any overt LOB observed.  Good sequencing noted with the SBQC.  Pt remains unsafe for ambulation without direct supervision.    Balance Training      Wheelchair Mobility      Stair Climbing      Car Transfer      Other:           Assessment:     Patient tolerated session fairly.  As previously documented, pt has reached a functional plateau but remains unsafe to return to an independent living environment. Pt does not have access to 24 hour care at home.  Though her independence with basic mobility has improved, she continues to require direct supervision for safety at all times.  Family considering a transition to SNF/NH at this time.        GOALS:   Multidisciplinary  Problems       Physical Therapy Goals          Problem: Physical Therapy    Goal Priority Disciplines Outcome Interventions   Physical Therapy Goal     PT, PT/OT Progressing    Description: Patient will increase functional independence with mobility by performin. Supine to sit with Stand-by Assistance  2. Sit to supine with Stand-by Assistance  3. Sit to stand transfer with Stand-by Assistance  4. Gait  x 225 feet with Stand-by Assistance using No Assistive Device vs SQBC.                            Discharge Recommendations:      24 hour care    Discharge Equipment Recommendations:      (TBD)     At end of treatment, patient remained:      Up in chair    X In bed   X With alarm activated    Bed rails up   X Call bell in reach      Family/friends present     Restraints secured properly     In bathroom with CNA/RN notified     In gym with PT/PTA/tech     Nurse aware    X Other:  student nurse present           PT Start Time: 1218     PT Stop Time: 1233  PT Total Time (min): 15 min     Billable Minutes: Gait Training 15      2025

## 2025-02-06 NOTE — PROGRESS NOTES
[facility]  [facility] Medicine  Progress Note    Patient Name: Brittany Nunn  MRN: [id number]  Patient Class: IP- Swing   Admission Date: 1/27/2025  Length of Stay: 10 days  Attending Physician: Jordon Noble MD  Primary Care Provider: Ester, Primary Doctor        Subjective     Principal Problem:Physical deconditioning        HPI:  88 yo female admitted to swing bed today for continued therapy.  She is s/p a fall at home in which she tripped and fell on her right shoulder.  No LOC. She was sent to [facility] where she was found to have an impacted humeral head/neck fracture with some mild subluxation.  This was determined to be non-surgical.  She does c/o pain to her shoulder otherwise she stats she is calm.  Currently no N/V/D/C.  No fever/chills.  NO chest pian/SOB.  She will be admitted for continued PT/OT services and pain control.    Overview/Hospital Course:  1/28/25: Pt was in the process of changing rooms when I saw her this morning. She was nauseous due to her right arm pain. She had vomited a small amount of bile. She denied any cp or sob. I spoke to her daughter this afternoon. She lives in [place]. There is a brother that has passed away, and another brother that lives in [place]. She states that they have been noticing a decline of their parents over the last year and are preparing for the fact that their living situation likely needs to change. She is available by phone if we have any questions. She states that the pt was using a walker, possibly daily, for ambulation. They were eating microwavable foods, mostly. For groceries they do grocery . Also states that since her TIA, the pt has had some urinary incontinence, requiring the use of some type of padding/diaper.    1/29/25: PT sitting up in her recliner during multidisciplinary rounds meeting. Daughter was on the phone. OT/ST is recommending 24 hour care for safety and ADLs. Daughter understands and will  "start working on this. Pt denies sob, cp, abd pain, constipation. Last bm yesterday. Normally has a bm oqd. She is only complaining of right arm pain.     1/30/25: Pt sitting up in her recliner. She asked me to open her diet coke. She said she cannot use her right hand at all to help her open it. Also said that her left hand isn't strong enough to pop open the tab. I pointed out that even these simple tasks are difficult for her now. She spoke to her daughter on the phone earlier this morning. I asked if they had started discussing a plan for care once she is discharged. She said she plans to stay here until her insurance "runs out." I asked what the plan was when she goes home. She said that they were looking at finding a family member that could come in for breakfast and then again around lunch. I reminded her that we are recommending 24 hour care. She said they did not discuss that. I recommended they do so. Reminded her of the options her daughter had mentioned yesterday of either a NH or for the pt to go and live with on the children. Pt said that "maybe someone could drive me to [place] to stay with my daughter-in-law, but she has 2 teenagers." I encouraged her to discuss this further with her daughter. No complaints at this time.    1/31/25: Pt sitting up in her recliner. No complaints this morning. CBG is 450s. Scheduled 5 units given. Post prandial, remains >400. Pt had pancakes for breakfast with syrup on the diabetic diet.  Pt informed the nurse that at home she takes metformin and glimepiride. These were not listed on her home meds upon admission. I will restart these.     2/1/25: Pt sitting up in her recliner. States that her  is coming to visit her today. She is happy about this. Her grand daughter is bringing him. She is having bm's more than usual. I will change the docusate to prn. No other complaints.    2/2/25: Pt lying in bed. She had a nice visit with her  yesterday. He is in a " wheel chair. She laughed and said he asked if he could stay the night with her in the hospital. She has no complaints this morning.     2/3/25: Pt was sitting up in her recliner this morning. She was vomiting bile earlier. She states it is due to her GERD and that this happens at home. Pt's insurance review is today. We are expecting pt will be ready for dc by the end of the week. CM is communicating with family and encouraging a long term plan for 24 hr care. Awaiting their decision.    2/4/25-No changes today.  She is doing well at this time.  D/C later this week.      2/5/25-No new issues today.  Swing bed meeting today with patient and daughter on phone.  Likely d/c on Friday.    2/6/25-No new issues today. CM is working on placement versus home.  Will continue with current treatment.    Interval History:     Review of Systems   Constitutional:  Positive for activity change.   HENT: Negative.     Eyes: Negative.    Respiratory: Negative.     Cardiovascular: Negative.    Gastrointestinal: Negative.    Endocrine: Negative.    Genitourinary: Negative.    Musculoskeletal:  Positive for arthralgias.   Skin: Negative.    Allergic/Immunologic: Negative.    Neurological: Negative.    Hematological: Negative.    Psychiatric/Behavioral: Negative.       Objective:     Vital Signs (Most Recent):  Temp: 98.2 °F (36.8 °C) (02/06/25 0813)  Pulse: (!) 114 (02/06/25 0813)  Resp: 18 (02/05/25 2056)  BP: 95/61 (02/06/25 0813)  SpO2: 97 % (02/06/25 0813) Vital Signs (24h Range):  Temp:  [98.1 °F (36.7 °C)-98.2 °F (36.8 °C)] 98.2 °F (36.8 °C)  Pulse:  [] 114  Resp:  [18] 18  SpO2:  [96 %-97 %] 97 %  BP: ()/(61-65) 95/61     Weight: 66.1 kg (145 lb 11.6 oz)  Body mass index is 24.25 kg/m².    Intake/Output Summary (Last 24 hours) at 2/6/2025 1213  Last data filed at 2/6/2025 0645  Gross per 24 hour   Intake 880 ml   Output 0 ml   Net 880 ml         Physical Exam  Constitutional:       Appearance: Normal appearance. She is  "normal weight.   HENT:      Head: Normocephalic and atraumatic.      Nose: Nose normal.      Mouth/Throat:      Mouth: Mucous membranes are moist.      Pharynx: Oropharynx is clear.   Eyes:      Extraocular Movements: Extraocular movements intact.      Conjunctiva/sclera: Conjunctivae normal.      Pupils: Pupils are equal, round, and reactive to light.   Cardiovascular:      Rate and Rhythm: Normal rate and regular rhythm.      Pulses: Normal pulses.      Heart sounds: Normal heart sounds.   Pulmonary:      Effort: Pulmonary effort is normal.      Breath sounds: Normal breath sounds.   Abdominal:      General: Bowel sounds are normal.      Palpations: Abdomen is soft.   Musculoskeletal:         General: Normal range of motion.      Cervical back: Normal range of motion and neck supple.   Skin:     General: Skin is warm and dry.      Capillary Refill: Capillary refill takes 2 to 3 seconds.   Neurological:      General: No focal deficit present.      Mental Status: She is alert and oriented to person, place, and time. Mental status is at baseline.   Psychiatric:         Mood and Affect: Mood normal.         Behavior: Behavior normal.         Thought Content: Thought content normal.         Judgment: Judgment normal.             Significant Labs: All pertinent labs within the past 24 hours have been reviewed.  BMP:   Recent Labs   Lab 02/06/25 0526      K 4.4      CO2 29   BUN 28.0*   CREATININE 0.78   CALCIUM 10.2     CBC:   Recent Labs   Lab 02/05/25 0439 02/06/25 0526   WBC 10.37 8.09   HGB 10.3* 9.9*   HCT 35.1* 32.7*   * 346     CMP:   Recent Labs   Lab 02/05/25 0439 02/06/25 0526    141   K 4.1 4.4   CL 98 103   CO2 29 29   BUN 29.0* 28.0*   CREATININE 0.95 0.78   CALCIUM 10.9* 10.2   ALBUMIN 3.7 3.4   BILITOT 0.5 0.5   ALKPHOS 113 103   AST 20 20   ALT 14 14     Magnesium: No results for input(s): "MG" in the last 48 hours.    Significant Imaging: I have reviewed all pertinent " imaging results/findings within the past 24 hours.    Assessment and Plan     * Physical deconditioning  Admit to swing bed  OOB  PT/OT  Resume transfer meds  DVT prophylaxis  Follow labs      GERD (gastroesophageal reflux disease)  Pt has had multiple episodes of vomiting bile in the mornings. She should have further outpt workup with her PCP/GI to address this.       Type 2 diabetes mellitus, with long-term current use of insulin  Resume lantus and aspart 5 units tid.  1/29/25: Increase lantus to 20units. CBGs >400. Continue to monitor.  1/30/25: Increase lantus to 22units.  this morning. Continue to monitor.  1/31/25: Pt states that she also takes metformin and glimepiride at home. These meds were added to her home med list and I am restarting them. I will decrease glargine back to home dose for tonight and re-evaluate in the morning. moderate sliding scale in place of scheduled aspart, as pt was requiring over her 5 units. Will monitor, may need to change to low.    Closed fracture of proximal end of right humerus  PT/OT  Recs as per ortho      Moderate malnutrition  Nutrition consulted. Most recent weight and BMI monitored-     Measurements:  Wt Readings from Last 1 Encounters:   02/02/25 66.1 kg (145 lb 11.6 oz)   Body mass index is 24.25 kg/m².    Patient has been screened and assessed by RD.    Malnutrition Type:  Context: acute illness or injury  Level: moderate    Malnutrition Characteristic Summary:  Weight Loss (Malnutrition): other (see comments) (Does not meet criteria)  Energy Intake (Malnutrition): less than or equal to 50% for greater than or equal to 5 days  Subcutaneous Fat (Malnutrition): mild depletion  Muscle Mass (Malnutrition): mild depletion  Fluid Accumulation (Malnutrition): other (see comments) (Not present)  Hand  Strength, Right (Malnutrition): Measurably reduced for age/gender    Interventions/Recommendations (treatment strategy):  Oral nutritional supplement;Care  coordination or referral;Feeding assistance/management;Oral diet/nutrient modifications        VTE Risk Mitigation (From admission, onward)           Ordered     enoxaparin injection 40 mg  Daily         01/27/25 3628                  DVT prophylaxis  Therapy  OOB  CM working on placement  Discharge Planning   ISATU:      Code Status: Full Code   Medical Readiness for Discharge Date:   Discharge Plan A: Skilled Nursing Facility   Discharge Delays: (!) Patient and Family Barriers            Please place Justification for DME        Jordon Noble MD  Department of Hospital Medicine   [facility]

## 2025-02-06 NOTE — SUBJECTIVE & OBJECTIVE
Interval History:     Review of Systems   Constitutional:  Positive for activity change.   HENT: Negative.     Eyes: Negative.    Respiratory: Negative.     Cardiovascular: Negative.    Gastrointestinal: Negative.    Endocrine: Negative.    Genitourinary: Negative.    Musculoskeletal:  Positive for arthralgias.   Skin: Negative.    Allergic/Immunologic: Negative.    Neurological: Negative.    Hematological: Negative.    Psychiatric/Behavioral: Negative.       Objective:     Vital Signs (Most Recent):  Temp: 98.2 °F (36.8 °C) (02/06/25 0813)  Pulse: (!) 114 (02/06/25 0813)  Resp: 18 (02/05/25 2056)  BP: 95/61 (02/06/25 0813)  SpO2: 97 % (02/06/25 0813) Vital Signs (24h Range):  Temp:  [98.1 °F (36.7 °C)-98.2 °F (36.8 °C)] 98.2 °F (36.8 °C)  Pulse:  [] 114  Resp:  [18] 18  SpO2:  [96 %-97 %] 97 %  BP: ()/(61-65) 95/61     Weight: 66.1 kg (145 lb 11.6 oz)  Body mass index is 24.25 kg/m².    Intake/Output Summary (Last 24 hours) at 2/6/2025 1213  Last data filed at 2/6/2025 0645  Gross per 24 hour   Intake 880 ml   Output 0 ml   Net 880 ml         Physical Exam  Constitutional:       Appearance: Normal appearance. She is normal weight.   HENT:      Head: Normocephalic and atraumatic.      Nose: Nose normal.      Mouth/Throat:      Mouth: Mucous membranes are moist.      Pharynx: Oropharynx is clear.   Eyes:      Extraocular Movements: Extraocular movements intact.      Conjunctiva/sclera: Conjunctivae normal.      Pupils: Pupils are equal, round, and reactive to light.   Cardiovascular:      Rate and Rhythm: Normal rate and regular rhythm.      Pulses: Normal pulses.      Heart sounds: Normal heart sounds.   Pulmonary:      Effort: Pulmonary effort is normal.      Breath sounds: Normal breath sounds.   Abdominal:      General: Bowel sounds are normal.      Palpations: Abdomen is soft.   Musculoskeletal:         General: Normal range of motion.      Cervical back: Normal range of motion and neck supple.  "  Skin:     General: Skin is warm and dry.      Capillary Refill: Capillary refill takes 2 to 3 seconds.   Neurological:      General: No focal deficit present.      Mental Status: She is alert and oriented to person, place, and time. Mental status is at baseline.   Psychiatric:         Mood and Affect: Mood normal.         Behavior: Behavior normal.         Thought Content: Thought content normal.         Judgment: Judgment normal.             Significant Labs: All pertinent labs within the past 24 hours have been reviewed.  BMP:   Recent Labs   Lab 02/06/25 0526      K 4.4      CO2 29   BUN 28.0*   CREATININE 0.78   CALCIUM 10.2     CBC:   Recent Labs   Lab 02/05/25 0439 02/06/25 0526   WBC 10.37 8.09   HGB 10.3* 9.9*   HCT 35.1* 32.7*   * 346     CMP:   Recent Labs   Lab 02/05/25 0439 02/06/25 0526    141   K 4.1 4.4   CL 98 103   CO2 29 29   BUN 29.0* 28.0*   CREATININE 0.95 0.78   CALCIUM 10.9* 10.2   ALBUMIN 3.7 3.4   BILITOT 0.5 0.5   ALKPHOS 113 103   AST 20 20   ALT 14 14     Magnesium: No results for input(s): "MG" in the last 48 hours.    Significant Imaging: I have reviewed all pertinent imaging results/findings within the past 24 hours.  "

## 2025-02-07 VITALS
SYSTOLIC BLOOD PRESSURE: 96 MMHG | WEIGHT: 145.75 LBS | HEART RATE: 101 BPM | TEMPERATURE: 99 F | DIASTOLIC BLOOD PRESSURE: 61 MMHG | BODY MASS INDEX: 24.28 KG/M2 | RESPIRATION RATE: 20 BRPM | OXYGEN SATURATION: 95 % | HEIGHT: 65 IN

## 2025-02-07 LAB
ALBUMIN SERPL-MCNC: 3.2 G/DL (ref 3.4–4.8)
ALBUMIN/GLOB SERPL: 0.9 RATIO (ref 1.1–2)
ALP SERPL-CCNC: 97 UNIT/L (ref 40–150)
ALT SERPL-CCNC: 15 UNIT/L (ref 0–55)
ANION GAP SERPL CALC-SCNC: 8 MEQ/L
AST SERPL-CCNC: 21 UNIT/L (ref 5–34)
BASOPHILS # BLD AUTO: 0.08 X10(3)/MCL
BASOPHILS NFR BLD AUTO: 1 %
BILIRUB SERPL-MCNC: 0.4 MG/DL
BUN SERPL-MCNC: 28 MG/DL (ref 9.8–20.1)
CALCIUM SERPL-MCNC: 9.7 MG/DL (ref 8.4–10.2)
CHLORIDE SERPL-SCNC: 103 MMOL/L (ref 98–107)
CO2 SERPL-SCNC: 29 MMOL/L (ref 23–31)
CREAT SERPL-MCNC: 0.74 MG/DL (ref 0.55–1.02)
CREAT/UREA NIT SERPL: 38
EOSINOPHIL # BLD AUTO: 0.28 X10(3)/MCL (ref 0–0.9)
EOSINOPHIL NFR BLD AUTO: 3.6 %
ERYTHROCYTE [DISTWIDTH] IN BLOOD BY AUTOMATED COUNT: 20.7 % (ref 11.5–17)
GFR SERPLBLD CREATININE-BSD FMLA CKD-EPI: >60 ML/MIN/1.73/M2
GLOBULIN SER-MCNC: 3.4 GM/DL (ref 2.4–3.5)
GLUCOSE SERPL-MCNC: 117 MG/DL (ref 82–115)
HCT VFR BLD AUTO: 30.7 % (ref 37–47)
HGB BLD-MCNC: 9.2 G/DL (ref 12–16)
IMM GRANULOCYTES # BLD AUTO: 0.04 X10(3)/MCL (ref 0–0.04)
IMM GRANULOCYTES NFR BLD AUTO: 0.5 %
LYMPHOCYTES # BLD AUTO: 2.71 X10(3)/MCL (ref 0.6–4.6)
LYMPHOCYTES NFR BLD AUTO: 34.9 %
MCH RBC QN AUTO: 25.3 PG (ref 27–31)
MCHC RBC AUTO-ENTMCNC: 30 G/DL (ref 33–36)
MCV RBC AUTO: 84.3 FL (ref 80–94)
MONOCYTES # BLD AUTO: 0.58 X10(3)/MCL (ref 0.1–1.3)
MONOCYTES NFR BLD AUTO: 7.5 %
NEUTROPHILS # BLD AUTO: 4.07 X10(3)/MCL (ref 2.1–9.2)
NEUTROPHILS NFR BLD AUTO: 52.5 %
NRBC BLD AUTO-RTO: 0 %
PLATELET # BLD AUTO: 353 X10(3)/MCL (ref 130–400)
PMV BLD AUTO: 9.6 FL (ref 7.4–10.4)
POCT GLUCOSE: 111 MG/DL (ref 70–110)
POCT GLUCOSE: 175 MG/DL (ref 70–110)
POTASSIUM SERPL-SCNC: 4.3 MMOL/L (ref 3.5–5.1)
PROT SERPL-MCNC: 6.6 GM/DL (ref 5.8–7.6)
RBC # BLD AUTO: 3.64 X10(6)/MCL (ref 4.2–5.4)
SODIUM SERPL-SCNC: 140 MMOL/L (ref 136–145)
WBC # BLD AUTO: 7.76 X10(3)/MCL (ref 4.5–11.5)

## 2025-02-07 PROCEDURE — 80053 COMPREHEN METABOLIC PANEL: CPT | Performed by: INTERNAL MEDICINE

## 2025-02-07 PROCEDURE — 85025 COMPLETE CBC W/AUTO DIFF WBC: CPT | Performed by: INTERNAL MEDICINE

## 2025-02-07 PROCEDURE — 36415 COLL VENOUS BLD VENIPUNCTURE: CPT | Performed by: INTERNAL MEDICINE

## 2025-02-07 PROCEDURE — 25000003 PHARM REV CODE 250: Performed by: INTERNAL MEDICINE

## 2025-02-07 PROCEDURE — 97530 THERAPEUTIC ACTIVITIES: CPT

## 2025-02-07 RX ORDER — HYDROCODONE BITARTRATE AND ACETAMINOPHEN 5; 325 MG/1; MG/1
1 TABLET ORAL EVERY 6 HOURS PRN
Qty: 15 TABLET | Refills: 0 | Status: SHIPPED | OUTPATIENT
Start: 2025-02-07

## 2025-02-07 RX ORDER — HYDROCODONE BITARTRATE AND ACETAMINOPHEN 5; 325 MG/1; MG/1
1 TABLET ORAL EVERY 6 HOURS
Start: 2025-02-07 | End: 2025-02-14

## 2025-02-07 RX ORDER — INSULIN GLARGINE 100 [IU]/ML
20 INJECTION, SOLUTION SUBCUTANEOUS NIGHTLY
Qty: 6 ML | Refills: 11 | Status: SHIPPED | OUTPATIENT
Start: 2025-02-07 | End: 2026-02-07

## 2025-02-07 RX ADMIN — GLIMEPIRIDE 4 MG: 2 TABLET ORAL at 05:02

## 2025-02-07 RX ADMIN — MEMANTINE 10 MG: 5 TABLET ORAL at 09:02

## 2025-02-07 RX ADMIN — METFORMIN HYDROCHLORIDE 500 MG: 500 TABLET ORAL at 09:02

## 2025-02-07 RX ADMIN — ATORVASTATIN CALCIUM 20 MG: 10 TABLET, FILM COATED ORAL at 09:02

## 2025-02-07 RX ADMIN — ALUMINUM HYDROXIDE, MAGNESIUM HYDROXIDE, AND DIMETHICONE 30 ML: 200; 20; 200 SUSPENSION ORAL at 11:02

## 2025-02-07 RX ADMIN — LISINOPRIL 5 MG: 5 TABLET ORAL at 09:02

## 2025-02-07 RX ADMIN — ALUMINUM HYDROXIDE, MAGNESIUM HYDROXIDE, AND DIMETHICONE 30 ML: 200; 20; 200 SUSPENSION ORAL at 06:02

## 2025-02-07 RX ADMIN — LEVOTHYROXINE SODIUM 100 MCG: 0.1 TABLET ORAL at 05:02

## 2025-02-07 NOTE — PT/OT/SLP PROGRESS
Occupational Therapy  Treatment    Name: Brittany Nunn    : 1935 (89 y.o.)  MRN: [id number]          TREATMENT SUMMARY AND RECOMMENDATIONS:      Subjective Assessment:   No complaints  Lethargic   x Awake, alert, cooperative  Uncooperative    Agitated  Flat affect    Appropriate  c/o fatigue   x Confused x Treated at bedside     Emotionally liable  Treated in gym/dept.    Impulsive x Other: Pt reported arthritis has flared up today, felt better after intervnettion       Pain/Comfort:  Pain Rating 1: 5/10  Location - Side 1: Right  Location 1: shoulder  Pain Addressed 1: Reposition, Distraction, Other (see comments) (Moist heat followed by therapeutic massage with topical anlgesic)  Pain Rating Post-Intervention 1: 210      Therapy Precautions:   x Cognitive deficits  Spinal precautions    Collar - hard  Sternal precautions    Collar - soft   TLSO   x Fall risk  LSO    Hip precautions - posterior  Knee immobilizer    Hip precautions - anterior  WBAT    Impaired communication  Partial weightbearing    Oxygen  TTWB    PEG tube RUE NWB    Visual deficits x Other: RUE sling.    Hearing deficits           Vitals Value   x Room air      Oxygen (L)     Blood pressure     Heart rate         Treatment Objectives:     Mobility Training:    Mobility task Assist level Comments    Bed mobility SBA Transfer training sit to supine SBA using be rail from left side   Balance training     Transfer CGA Transfer training recliner <>bed CGA with SBQC and verbal cuing for proper hand placement   Functional mobility     Sit to stand transitions CGA Transfer training sit to stand CGA and verbal cuing for proper hand placement.   Other:       ADL Training:    ADL Assist level Comments    Feeding     Grooming/hygiene     Bathing     Upper body dressing     Lower body dressing     Toileting     Toilet transfer     Adaptive equipment training     Other:         Additional Treatment:  While sitting at EOB performed therapeutic massage  with topical analgesic to lower back followed by trigger point releasing.  Transfers to supine, moist heat applied to RUE shoulder to elbow 10 mins followed by therapeutic massage with topical analgesic to upper traps, scapular region, and upper arm.  Performed right elbow PROM 1x10 reps flex/ext, noted ROM has increase to WNLs.  Performed AROM ex's 1x10 reps forearm sup/pron, wrist flex/ext with 1 verbal cue for proper technique.    Assessment: Patient tolerated session fair.  Pt reported arthritis has flared-up today reporting increased discomfort in her back and RUE.  Performed functional transfers with CGA using SBQC.  Noted RUE edema has resolved and ecchymoses conts to decrease.  End of session nursing student present, pt left in supine for student to perform assessment.  Continue to recommend 24-hour care to assist with transfers and ADLs.    OT Plan: OT to focus next treatment session on pain/edema management, transfer training, and ADL training.      GOALS:   Multidisciplinary Problems       Occupational Therapy Goals          Problem: Occupational Therapy    Goal Priority Disciplines Outcome Interventions   Occupational Therapy Goal     OT, PT/OT Progressing    Description:   Patient will increase functional independence with ADLs by performing:    LE Dressing with Minimal Assistance.(Progressing)  Toileting from bedside commode with Contact Guard Assistance for hygiene and clothing management. (Progressing)  Bathing from  shower chair/bench with Minimal Assistance.(Progressing)  Toilet transfer to bedside commode with Contact Guard Assistance. (Goal Met)                         Communication with Treatment Team:     Discharge Recommendations:  To be determined  Discharge Equipment Recommendations:   (TBD)  Barriers to discharge:  Decreased caregiver support      At end of treatment, patient remained:   Up in chair    x In bed   x With alarm activated    Bed rails up   x Call bell in reach      Family/friends present    Restraints secured properly    In bathroom with CNA/RN notified    In gym with PT/PTA/tech   x Nurse aware    Other:         OT Date of Treatment: 02/07/25  OT Start Time: 0845  OT Stop Time: 0917  OT Total Time (min): 32 min    Billable Minutes:Self Care/Home Management 0  Therapeutic Activity 32      2/7/2025

## 2025-02-07 NOTE — PLAN OF CARE
Problem: Adult Inpatient Plan of Care  Goal: Plan of Care Review  2/7/2025 1248 by Hali Hoyos RN  Outcome: Met  2/7/2025 1223 by Hali Hoyos RN  Outcome: Progressing  2/7/2025 1212 by Hali Hoyos RN  Outcome: Progressing  Goal: Patient-Specific Goal (Individualized)  2/7/2025 1248 by Hali Hoyos RN  Outcome: Met  2/7/2025 1223 by Hali Hoyos RN  Outcome: Progressing  2/7/2025 1212 by Hali Hoyos RN  Outcome: Progressing  Goal: Absence of Hospital-Acquired Illness or Injury  2/7/2025 1248 by Hali Hoyos RN  Outcome: Met  2/7/2025 1223 by Hali Hoyos RN  Outcome: Progressing  2/7/2025 1212 by Hali Hoyos RN  Outcome: Progressing  Goal: Optimal Comfort and Wellbeing  2/7/2025 1248 by Hali Hoyos RN  Outcome: Met  2/7/2025 1223 by Hali Hoyos RN  Outcome: Progressing  2/7/2025 1212 by Hali Hoyos RN  Outcome: Progressing  Goal: Readiness for Transition of Care  2/7/2025 1248 by Hali Hoyos RN  Outcome: Met  2/7/2025 1223 by Hali Hoyos RN  Outcome: Progressing  2/7/2025 1212 by Hali Hoyos RN  Outcome: Progressing     Problem: Diabetes Comorbidity  Goal: Blood Glucose Level Within Targeted Range  2/7/2025 1248 by Hali Hoyos RN  Outcome: Met  2/7/2025 1223 by Hali Hoyos RN  Outcome: Progressing  2/7/2025 1212 by Hali Hoyos RN  Outcome: Progressing     Problem: Skin Injury Risk Increased  Goal: Skin Health and Integrity  2/7/2025 1248 by Hali Hoyos RN  Outcome: Met  2/7/2025 1223 by Hali Hoyos RN  Outcome: Progressing  2/7/2025 1212 by Hali Hoyos RN  Outcome: Progressing     Problem: Fall Injury Risk  Goal: Absence of Fall and Fall-Related Injury  2/7/2025 1248 by Hali Hoyos RN  Outcome: Met  2/7/2025 1223 by Hali Hoyos, RN  Outcome: Progressing  2/7/2025 1212 by Hali Hoyos, RN  Outcome: Progressing     Problem: Wound  Goal: Optimal Coping  2/7/2025 1248 by Hali Hoyos, RN  Outcome:  Met  2/7/2025 1223 by Hali Hoyos RN  Outcome: Progressing  2/7/2025 1212 by Hali Hoyos RN  Outcome: Progressing  Goal: Optimal Functional Ability  2/7/2025 1248 by Hali Hoyos RN  Outcome: Met  2/7/2025 1223 by Hali Hoyos RN  Outcome: Progressing  2/7/2025 1212 by Hali Hoyos, RN  Outcome: Progressing  Goal: Absence of Infection Signs and Symptoms  2/7/2025 1248 by Hali Hoyos RN  Outcome: Met  2/7/2025 1223 by Hali Hoyos RN  Outcome: Progressing  2/7/2025 1212 by Hali Hoyos RN  Outcome: Progressing  Goal: Improved Oral Intake  2/7/2025 1248 by Hali Hoyos RN  Outcome: Met  2/7/2025 1223 by Hali Hoyos RN  Outcome: Progressing  2/7/2025 1212 by Hali Hoyos RN  Outcome: Progressing  Goal: Optimal Pain Control and Function  2/7/2025 1248 by Hali Hoyos RN  Outcome: Met  2/7/2025 1223 by Hali Hoyos RN  Outcome: Progressing  2/7/2025 1212 by Hali Hoyos RN  Outcome: Progressing  Goal: Skin Health and Integrity  2/7/2025 1248 by Hali Hoyos, RN  Outcome: Met  2/7/2025 1223 by Hali Hoyos, RN  Outcome: Progressing  2/7/2025 1212 by Hali Hoyos, RN  Outcome: Progressing  Goal: Optimal Wound Healing  2/7/2025 1248 by Hali Hoyos, RN  Outcome: Met  2/7/2025 1223 by Hali Hoyos RN  Outcome: Progressing  2/7/2025 1212 by Hali Hoyos RN  Outcome: Progressing

## 2025-02-07 NOTE — PT/OT/SLP DISCHARGE
Physical Therapy Discharge Summary    Name: Brittany Nunn  MRN: [id number]   Principal Problem: Physical deconditioning     Patient Discharged from acute Physical Therapy on 2025.     Assessment:     Patient appropriate for care in another setting.    Objective:     GOALS:   Multidisciplinary Problems       Physical Therapy Goals       Not on file              Multidisciplinary Problems (Resolved)          Problem: Physical Therapy    Goal Priority Disciplines Outcome Interventions   Physical Therapy Goal   (Resolved)     PT, PT/OT Adequate for care transition    Description: Patient will increase functional independence with mobility by performin. Supine to sit with Stand-by Assistance  2. Sit to supine with Stand-by Assistance  3. Sit to stand transfer with Stand-by Assistance  4. Gait  x 225 feet with Stand-by Assistance using No Assistive Device vs SQBC.                          Reasons for Discontinuation of Therapy Services  Transfer to alternate level of care.      Plan:     Patient Discharged to: Skilled Nursing Facility.      2025

## 2025-02-07 NOTE — PLAN OF CARE
02/07/25 1222   Final Note   Assessment Type Final Discharge Note   Anticipated Discharge Disposition SNF   What phone number can be called within the next 1-3 days to see how you are doing after discharge? [phone]   Hospital Resources/Appts/Education Provided Provided education on problems/symptoms using teachback   Post-Acute Status   Post-Acute Authorization Placement   Post-Acute Placement Status Set-up Complete/Auth obtained   Coverage [employer]   Discharge Delays None known at this time     Will discharge to [facility] for skilled nursing.

## 2025-02-07 NOTE — DISCHARGE SUMMARY
[facility]  Hospital Medicine  Discharge Summary      Patient Name: Brittany Nunn  MRN: [id number]  BALAJI: [id number]  Patient Class: IP- Swing  Admission Date: 1/27/2025  Hospital Length of Stay: 11 days  Discharge Date and Time:  02/07/2025 12:18 PM  Attending Physician: Jordon Noble MD   Discharging Provider: Jordon Noble MD  Primary Care Provider: Ester, Primary Doctor    Primary Care Team: Networked reference to record PCT     HPI:   89 yo female admitted to swing bed today for continued therapy.  She is s/p a fall at home in which she tripped and fell on her right shoulder.  No LOC. She was sent to [facility] where she was found to have an impacted humeral head/neck fracture with some mild subluxation.  This was determined to be non-surgical.  She does c/o pain to her shoulder otherwise she stats she is calm.  Currently no N/V/D/C.  No fever/chills.  NO chest pian/SOB.  She will be admitted for continued PT/OT services and pain control.    * No surgery found *      Hospital Course:   1/28/25: Pt was in the process of changing rooms when I saw her this morning. She was nauseous due to her right arm pain. She had vomited a small amount of bile. She denied any cp or sob. I spoke to her daughter this afternoon. She lives in [place]. There is a brother that has passed away, and another brother that lives in [place]. She states that they have been noticing a decline of their parents over the last year and are preparing for the fact that their living situation likely needs to change. She is available by phone if we have any questions. She states that the pt was using a walker, possibly daily, for ambulation. They were eating microwavable foods, mostly. For groceries they do grocery . Also states that since her TIA, the pt has had some urinary incontinence, requiring the use of some type of padding/diaper.    1/29/25: PT sitting up in her recliner during  "multidisciplinary rounds meeting. Daughter was on the phone. OT/ST is recommending 24 hour care for safety and ADLs. Daughter understands and will start working on this. Pt denies sob, cp, abd pain, constipation. Last bm yesterday. Normally has a bm oqd. She is only complaining of right arm pain.     1/30/25: Pt sitting up in her recliner. She asked me to open her diet coke. She said she cannot use her right hand at all to help her open it. Also said that her left hand isn't strong enough to pop open the tab. I pointed out that even these simple tasks are difficult for her now. She spoke to her daughter on the phone earlier this morning. I asked if they had started discussing a plan for care once she is discharged. She said she plans to stay here until her insurance "runs out." I asked what the plan was when she goes home. She said that they were looking at finding a family member that could come in for breakfast and then again around lunch. I reminded her that we are recommending 24 hour care. She said they did not discuss that. I recommended they do so. Reminded her of the options her daughter had mentioned yesterday of either a NH or for the pt to go and live with on the children. Pt said that "maybe someone could drive me to [place] to stay with my daughter-in-law, but she has 2 teenagers." I encouraged her to discuss this further with her daughter. No complaints at this time.    1/31/25: Pt sitting up in her recliner. No complaints this morning. CBG is 450s. Scheduled 5 units given. Post prandial, remains >400. Pt had pancakes for breakfast with syrup on the diabetic diet.  Pt informed the nurse that at home she takes metformin and glimepiride. These were not listed on her home meds upon admission. I will restart these.     2/1/25: Pt sitting up in her recliner. States that her  is coming to visit her today. She is happy about this. Her grand daughter is bringing him. She is having bm's more than usual. " I will change the docusate to prn. No other complaints.    2/2/25: Pt lying in bed. She had a nice visit with her  yesterday. He is in a wheel chair. She laughed and said he asked if he could stay the night with her in the hospital. She has no complaints this morning.     2/3/25: Pt was sitting up in her recliner this morning. She was vomiting bile earlier. She states it is due to her GERD and that this happens at home. Pt's insurance review is today. We are expecting pt will be ready for dc by the end of the week. CM is communicating with family and encouraging a long term plan for 24 hr care. Awaiting their decision.    2/4/25-No changes today.  She is doing well at this time.  D/C later this week.      2/5/25-No new issues today.  Swing bed meeting today with patient and daughter on phone.  Likely d/c on Friday.    2/6/25-No new issues today. CM is working on placement versus home.  Will continue with current treatment.    2/7/25-Patient has been accepted to SNF but we are waiting for a 142.  She is doing well.    2/7/25-Patient has been approved for additional Skilled days.  She is stable for discharge to SNF.  Exam(A&O, NAD, RRR, CTA, BS +, ROM I except right arm)     Goals of Care Treatment Preferences:  Code Status: Full Code         Consults:     * Physical deconditioning  Admit to swing bed  OOB  PT/OT  Resume transfer meds  DVT prophylaxis  Follow labs      GERD (gastroesophageal reflux disease)  Pt has had multiple episodes of vomiting bile in the mornings. She should have further outpt workup with her PCP/GI to address this.       Type 2 diabetes mellitus, with long-term current use of insulin  Resume lantus and aspart 5 units tid.  1/29/25: Increase lantus to 20units. CBGs >400. Continue to monitor.  1/30/25: Increase lantus to 22units.  this morning. Continue to monitor.  1/31/25: Pt states that she also takes metformin and glimepiride at home. These meds were added to her home med list  and I am restarting them. I will decrease glargine back to home dose for tonight and re-evaluate in the morning. moderate sliding scale in place of scheduled aspart, as pt was requiring over her 5 units. Will monitor, may need to change to low.    Closed fracture of proximal end of right humerus  PT/OT  Recs as per ortho      Moderate malnutrition  Nutrition consulted. Most recent weight and BMI monitored-     Measurements:  Wt Readings from Last 1 Encounters:   02/02/25 66.1 kg (145 lb 11.6 oz)   Body mass index is 24.25 kg/m².    Patient has been screened and assessed by RD.    Malnutrition Type:  Context: acute illness or injury  Level: moderate    Malnutrition Characteristic Summary:  Weight Loss (Malnutrition): other (see comments) (Does not meet criteria)  Energy Intake (Malnutrition): less than or equal to 50% for greater than or equal to 5 days  Subcutaneous Fat (Malnutrition): mild depletion  Muscle Mass (Malnutrition): mild depletion  Fluid Accumulation (Malnutrition): other (see comments) (Not present)  Hand  Strength, Right (Malnutrition): Measurably reduced for age/gender    Interventions/Recommendations (treatment strategy):  Oral nutritional supplement;Care coordination or referral;Feeding assistance/management;Oral diet/nutrient modifications        Final Active Diagnoses:    Diagnosis Date Noted POA    PRINCIPAL PROBLEM:  Physical deconditioning [R53.81] 01/27/2025 Yes    GERD (gastroesophageal reflux disease) [K21.9] 02/03/2025 Yes    Moderate malnutrition [E44.0] 01/27/2025 Yes    Closed fracture of proximal end of right humerus [S42.201A] 01/27/2025 Yes    Type 2 diabetes mellitus, with long-term current use of insulin [E11.9, Z79.4] 01/27/2025 Not Applicable      Problems Resolved During this Admission:       Discharged Condition: stable    Disposition: Skilled Nursing Facility    Follow Up:   Contact information for after-discharge care       Destination       [facility]     Service: Skilled Nursing  Contact information:  [address] [facility] [address]  [phone]                                 Patient Instructions:      Ambulatory referral/consult to Skilled Nursing   Standing Status: Future   Referral Priority: Routine Referral Type: Consultation   Referral Reason: Specialty Services Required   Requested Specialty: Skilled Nursing   Number of Visits Requested: 1       Significant Diagnostic Studies: Labs: BMP:   Recent Labs   Lab 02/06/25  0526 02/07/25 0445    140   K 4.4 4.3    103   CO2 29 29   BUN 28.0* 28.0*   CREATININE 0.78 0.74   CALCIUM 10.2 9.7   , CMP   Recent Labs   Lab 02/06/25  0526 02/07/25 0445    140   K 4.4 4.3    103   CO2 29 29   BUN 28.0* 28.0*   CREATININE 0.78 0.74   CALCIUM 10.2 9.7   ALBUMIN 3.4 3.2*   BILITOT 0.5 0.4   ALKPHOS 103 97   AST 20 21   ALT 14 15   , and CBC   Recent Labs   Lab 02/06/25 0526 02/07/25 0445   WBC 8.09 7.76   HGB 9.9* 9.2*   HCT 32.7* 30.7*    353       Pending Diagnostic Studies:       None           Medications:  Reconciled Home Medications:      Medication List        CHANGE how you take these medications      insulin glargine U-100 (Lantus) 100 unit/mL injection  Inject 20 Units into the skin every evening.  What changed: how much to take            CONTINUE taking these medications      atorvastatin 20 MG tablet  Commonly known as: LIPITOR  Take 1 tablet (20 mg total) by mouth once daily.     glimepiride 4 MG tablet  Commonly known as: AMARYL  Take 4 mg by mouth before breakfast.     HYDROcodone-acetaminophen 5-325 mg per tablet  Commonly known as: NORCO  Take 1 tablet by mouth every 6 (six) hours. for 7 days     levothyroxine 100 MCG tablet  Commonly known as: SYNTHROID  Take 1 tablet (100 mcg total) by mouth before breakfast.     lisinopriL 5 MG tablet  Commonly known as: PRINIVIL,ZESTRIL  Take 1 tablet (5 mg total) by mouth once daily.     memantine 10 MG  Tab  Commonly known as: NAMENDA  Take 1 tablet (10 mg total) by mouth 2 (two) times daily.     metFORMIN 500 MG tablet  Commonly known as: GLUCOPHAGE  Take 500 mg by mouth 2 (two) times daily with meals.     NovoLOG Flexpen U-100 Insulin 100 unit/mL (3 mL) Inpn pen  Generic drug: insulin aspart U-100  Inject 5 Units into the skin 3 (three) times daily with meals.              Indwelling Lines/Drains at time of discharge:   Lines/Drains/Airways       None                   Time spent on the discharge of patient: 39 minutes        Patient screened for food insecurity, housing instability, transportation needs, utility difficulties, and interpersonal safety.   Transfer to [facility]    Jordon Noble MD  Department of Hospital Medicine  [facility]

## 2025-02-07 NOTE — NURSING
Report to nurse Lopez at [facility]. Transportation in route. Patient informed.

## 2025-02-07 NOTE — PLAN OF CARE
Problem: Adult Inpatient Plan of Care  Goal: Plan of Care Review  Outcome: Progressing  Goal: Patient-Specific Goal (Individualized)  Outcome: Progressing  Goal: Absence of Hospital-Acquired Illness or Injury  Outcome: Progressing  Goal: Optimal Comfort and Wellbeing  Outcome: Progressing  Goal: Readiness for Transition of Care  Outcome: Progressing     Problem: Diabetes Comorbidity  Goal: Blood Glucose Level Within Targeted Range  Outcome: Progressing     Problem: Skin Injury Risk Increased  Goal: Skin Health and Integrity  Outcome: Progressing     Problem: Fall Injury Risk  Goal: Absence of Fall and Fall-Related Injury  Outcome: Progressing     Problem: Wound  Goal: Optimal Coping  Outcome: Progressing  Goal: Optimal Functional Ability  Outcome: Progressing  Goal: Absence of Infection Signs and Symptoms  Outcome: Progressing  Goal: Improved Oral Intake  Outcome: Progressing  Goal: Optimal Pain Control and Function  Outcome: Progressing  Goal: Skin Health and Integrity  Outcome: Progressing  Goal: Optimal Wound Healing  Outcome: Progressing     Problem: Pain Acute  Goal: Optimal Pain Control and Function  Outcome: Progressing     Problem: Orthopaedic Rehabilitation  Goal: Optimal Coping  Outcome: Progressing  Goal: Optimal Safe BADL Performance  Outcome: Progressing  Goal: Effective Bowel Elimination  Outcome: Progressing  Goal: Optimal Safe IADL Performance  Outcome: Progressing  Goal: Absence of Infection Signs/Symptoms  Outcome: Progressing  Goal: Optimal Mobility [place] and Safety  Outcome: Progressing

## 2025-02-07 NOTE — PLAN OF CARE
Problem: Adult Inpatient Plan of Care  Goal: Plan of Care Review  2/7/2025 1248 by Hali Hoyos RN  Outcome: Met  2/7/2025 1223 by Hali Hoyos RN  Outcome: Progressing  2/7/2025 1212 by Hali Hoyos RN  Outcome: Progressing  Goal: Patient-Specific Goal (Individualized)  2/7/2025 1248 by Hali Hoyos RN  Outcome: Met  2/7/2025 1223 by Hali Hoyos RN  Outcome: Progressing  2/7/2025 1212 by Hali Hoyos RN  Outcome: Progressing  Goal: Absence of Hospital-Acquired Illness or Injury  2/7/2025 1248 by Hali Hoyos RN  Outcome: Met  2/7/2025 1223 by Hali Hoyos RN  Outcome: Progressing  2/7/2025 1212 by Hali Hoyos RN  Outcome: Progressing  Goal: Optimal Comfort and Wellbeing  2/7/2025 1248 by Hali Hoyos RN  Outcome: Met  2/7/2025 1223 by Hali Hoyos RN  Outcome: Progressing  2/7/2025 1212 by Hali Hoyos RN  Outcome: Progressing  Goal: Readiness for Transition of Care  2/7/2025 1248 by Hali Hoyos RN  Outcome: Met  2/7/2025 1223 by Hali Hoyos RN  Outcome: Progressing  2/7/2025 1212 by Hali Hoyos RN  Outcome: Progressing

## 2025-02-07 NOTE — SUBJECTIVE & OBJECTIVE
Interval History:     Review of Systems   Constitutional:  Positive for activity change and fatigue.   HENT: Negative.     Eyes: Negative.    Respiratory: Negative.     Cardiovascular: Negative.    Gastrointestinal: Negative.    Endocrine: Negative.    Genitourinary: Negative.    Musculoskeletal:  Positive for arthralgias.   Skin: Negative.    Allergic/Immunologic: Negative.    Neurological: Negative.    Hematological: Negative.    Psychiatric/Behavioral: Negative.       Objective:     Vital Signs (Most Recent):  Temp: 98.7 °F (37.1 °C) (02/07/25 0800)  Pulse: 104 (02/07/25 0800)  Resp: 20 (02/07/25 0800)  BP: 96/61 (02/07/25 0800)  SpO2: 95 % (02/07/25 0800) Vital Signs (24h Range):  Temp:  [98.1 °F (36.7 °C)-98.7 °F (37.1 °C)] 98.7 °F (37.1 °C)  Pulse:  [104-106] 104  Resp:  [20] 20  SpO2:  [95 %-97 %] 95 %  BP: ()/(60-61) 96/61     Weight: 66.1 kg (145 lb 11.6 oz)  Body mass index is 24.25 kg/m².    Intake/Output Summary (Last 24 hours) at 2/7/2025 1038  Last data filed at 2/7/2025 0800  Gross per 24 hour   Intake 240 ml   Output --   Net 240 ml         Physical Exam  Constitutional:       Appearance: Normal appearance. She is normal weight.   HENT:      Head: Normocephalic and atraumatic.      Nose: Nose normal.      Mouth/Throat:      Mouth: Mucous membranes are moist.      Pharynx: Oropharynx is clear.   Eyes:      Extraocular Movements: Extraocular movements intact.      Conjunctiva/sclera: Conjunctivae normal.      Pupils: Pupils are equal, round, and reactive to light.   Cardiovascular:      Rate and Rhythm: Normal rate and regular rhythm.      Pulses: Normal pulses.      Heart sounds: Normal heart sounds.   Pulmonary:      Effort: Pulmonary effort is normal.      Breath sounds: Normal breath sounds.   Abdominal:      General: Bowel sounds are normal.      Palpations: Abdomen is soft.   Musculoskeletal:         General: Tenderness present. Normal range of motion.      Cervical back: Normal range of  "motion and neck supple.   Skin:     General: Skin is dry.      Capillary Refill: Capillary refill takes 2 to 3 seconds.   Neurological:      General: No focal deficit present.      Mental Status: She is alert and oriented to person, place, and time. Mental status is at baseline.   Psychiatric:         Mood and Affect: Mood normal.         Behavior: Behavior normal.         Thought Content: Thought content normal.         Judgment: Judgment normal.             Significant Labs: All pertinent labs within the past 24 hours have been reviewed.  BMP:   Recent Labs   Lab 02/07/25  0445      K 4.3      CO2 29   BUN 28.0*   CREATININE 0.74   CALCIUM 9.7     CBC:   Recent Labs   Lab 02/06/25  0526 02/07/25  0445   WBC 8.09 7.76   HGB 9.9* 9.2*   HCT 32.7* 30.7*    353     CMP:   Recent Labs   Lab 02/06/25  0526 02/07/25  0445    140   K 4.4 4.3    103   CO2 29 29   BUN 28.0* 28.0*   CREATININE 0.78 0.74   CALCIUM 10.2 9.7   ALBUMIN 3.4 3.2*   BILITOT 0.5 0.4   ALKPHOS 103 97   AST 20 21   ALT 14 15     Magnesium: No results for input(s): "MG" in the last 48 hours.    Significant Imaging: I have reviewed all pertinent imaging results/findings within the past 24 hours.  "

## 2025-02-07 NOTE — PROGRESS NOTES
[facility]  [facility] Medicine  Progress Note    Patient Name: Brittany Nunn  MRN: [id number]  Patient Class: IP- Swing   Admission Date: 1/27/2025  Length of Stay: 11 days  Attending Physician: Jordon Noble MD  Primary Care Provider: Ester, Primary Doctor        Subjective     Principal Problem:Physical deconditioning        HPI:  88 yo female admitted to swing bed today for continued therapy.  She is s/p a fall at home in which she tripped and fell on her right shoulder.  No LOC. She was sent to [facility] where she was found to have an impacted humeral head/neck fracture with some mild subluxation.  This was determined to be non-surgical.  She does c/o pain to her shoulder otherwise she stats she is calm.  Currently no N/V/D/C.  No fever/chills.  NO chest pian/SOB.  She will be admitted for continued PT/OT services and pain control.    Overview/Hospital Course:  1/28/25: Pt was in the process of changing rooms when I saw her this morning. She was nauseous due to her right arm pain. She had vomited a small amount of bile. She denied any cp or sob. I spoke to her daughter this afternoon. She lives in [place]. There is a brother that has passed away, and another brother that lives in [place]. She states that they have been noticing a decline of their parents over the last year and are preparing for the fact that their living situation likely needs to change. She is available by phone if we have any questions. She states that the pt was using a walker, possibly daily, for ambulation. They were eating microwavable foods, mostly. For groceries they do grocery . Also states that since her TIA, the pt has had some urinary incontinence, requiring the use of some type of padding/diaper.    1/29/25: PT sitting up in her recliner during multidisciplinary rounds meeting. Daughter was on the phone. OT/ST is recommending 24 hour care for safety and ADLs. Daughter understands and will  "start working on this. Pt denies sob, cp, abd pain, constipation. Last bm yesterday. Normally has a bm oqd. She is only complaining of right arm pain.     1/30/25: Pt sitting up in her recliner. She asked me to open her diet coke. She said she cannot use her right hand at all to help her open it. Also said that her left hand isn't strong enough to pop open the tab. I pointed out that even these simple tasks are difficult for her now. She spoke to her daughter on the phone earlier this morning. I asked if they had started discussing a plan for care once she is discharged. She said she plans to stay here until her insurance "runs out." I asked what the plan was when she goes home. She said that they were looking at finding a family member that could come in for breakfast and then again around lunch. I reminded her that we are recommending 24 hour care. She said they did not discuss that. I recommended they do so. Reminded her of the options her daughter had mentioned yesterday of either a NH or for the pt to go and live with on the children. Pt said that "maybe someone could drive me to [place] to stay with my daughter-in-law, but she has 2 teenagers." I encouraged her to discuss this further with her daughter. No complaints at this time.    1/31/25: Pt sitting up in her recliner. No complaints this morning. CBG is 450s. Scheduled 5 units given. Post prandial, remains >400. Pt had pancakes for breakfast with syrup on the diabetic diet.  Pt informed the nurse that at home she takes metformin and glimepiride. These were not listed on her home meds upon admission. I will restart these.     2/1/25: Pt sitting up in her recliner. States that her  is coming to visit her today. She is happy about this. Her grand daughter is bringing him. She is having bm's more than usual. I will change the docusate to prn. No other complaints.    2/2/25: Pt lying in bed. She had a nice visit with her  yesterday. He is in a " wheel chair. She laughed and said he asked if he could stay the night with her in the hospital. She has no complaints this morning.     2/3/25: Pt was sitting up in her recliner this morning. She was vomiting bile earlier. She states it is due to her GERD and that this happens at home. Pt's insurance review is today. We are expecting pt will be ready for dc by the end of the week. CM is communicating with family and encouraging a long term plan for 24 hr care. Awaiting their decision.    2/4/25-No changes today.  She is doing well at this time.  D/C later this week.      2/5/25-No new issues today.  Swing bed meeting today with patient and daughter on phone.  Likely d/c on Friday.    2/6/25-No new issues today. CM is working on placement versus home.  Will continue with current treatment.    2/7/25-Patient has been accepted to SNF but we are waiting for a 142.  She is doing well.    Interval History:     Review of Systems   Constitutional:  Positive for activity change and fatigue.   HENT: Negative.     Eyes: Negative.    Respiratory: Negative.     Cardiovascular: Negative.    Gastrointestinal: Negative.    Endocrine: Negative.    Genitourinary: Negative.    Musculoskeletal:  Positive for arthralgias.   Skin: Negative.    Allergic/Immunologic: Negative.    Neurological: Negative.    Hematological: Negative.    Psychiatric/Behavioral: Negative.       Objective:     Vital Signs (Most Recent):  Temp: 98.7 °F (37.1 °C) (02/07/25 0800)  Pulse: 104 (02/07/25 0800)  Resp: 20 (02/07/25 0800)  BP: 96/61 (02/07/25 0800)  SpO2: 95 % (02/07/25 0800) Vital Signs (24h Range):  Temp:  [98.1 °F (36.7 °C)-98.7 °F (37.1 °C)] 98.7 °F (37.1 °C)  Pulse:  [104-106] 104  Resp:  [20] 20  SpO2:  [95 %-97 %] 95 %  BP: ()/(60-61) 96/61     Weight: 66.1 kg (145 lb 11.6 oz)  Body mass index is 24.25 kg/m².    Intake/Output Summary (Last 24 hours) at 2/7/2025 1038  Last data filed at 2/7/2025 0800  Gross per 24 hour   Intake 240 ml    Output --   Net 240 ml         Physical Exam  Constitutional:       Appearance: Normal appearance. She is normal weight.   HENT:      Head: Normocephalic and atraumatic.      Nose: Nose normal.      Mouth/Throat:      Mouth: Mucous membranes are moist.      Pharynx: Oropharynx is clear.   Eyes:      Extraocular Movements: Extraocular movements intact.      Conjunctiva/sclera: Conjunctivae normal.      Pupils: Pupils are equal, round, and reactive to light.   Cardiovascular:      Rate and Rhythm: Normal rate and regular rhythm.      Pulses: Normal pulses.      Heart sounds: Normal heart sounds.   Pulmonary:      Effort: Pulmonary effort is normal.      Breath sounds: Normal breath sounds.   Abdominal:      General: Bowel sounds are normal.      Palpations: Abdomen is soft.   Musculoskeletal:         General: Tenderness present. Normal range of motion.      Cervical back: Normal range of motion and neck supple.   Skin:     General: Skin is dry.      Capillary Refill: Capillary refill takes 2 to 3 seconds.   Neurological:      General: No focal deficit present.      Mental Status: She is alert and oriented to person, place, and time. Mental status is at baseline.   Psychiatric:         Mood and Affect: Mood normal.         Behavior: Behavior normal.         Thought Content: Thought content normal.         Judgment: Judgment normal.             Significant Labs: All pertinent labs within the past 24 hours have been reviewed.  BMP:   Recent Labs   Lab 02/07/25  0445      K 4.3      CO2 29   BUN 28.0*   CREATININE 0.74   CALCIUM 9.7     CBC:   Recent Labs   Lab 02/06/25  0526 02/07/25  0445   WBC 8.09 7.76   HGB 9.9* 9.2*   HCT 32.7* 30.7*    353     CMP:   Recent Labs   Lab 02/06/25  0526 02/07/25  0445    140   K 4.4 4.3    103   CO2 29 29   BUN 28.0* 28.0*   CREATININE 0.78 0.74   CALCIUM 10.2 9.7   ALBUMIN 3.4 3.2*   BILITOT 0.5 0.4   ALKPHOS 103 97   AST 20 21   ALT 14 15  "    Magnesium: No results for input(s): "MG" in the last 48 hours.    Significant Imaging: I have reviewed all pertinent imaging results/findings within the past 24 hours.    Assessment and Plan     * Physical deconditioning  Admit to swing bed  OOB  PT/OT  Resume transfer meds  DVT prophylaxis  Follow labs      GERD (gastroesophageal reflux disease)  Pt has had multiple episodes of vomiting bile in the mornings. She should have further outpt workup with her PCP/GI to address this.       Type 2 diabetes mellitus, with long-term current use of insulin  Resume lantus and aspart 5 units tid.  1/29/25: Increase lantus to 20units. CBGs >400. Continue to monitor.  1/30/25: Increase lantus to 22units.  this morning. Continue to monitor.  1/31/25: Pt states that she also takes metformin and glimepiride at home. These meds were added to her home med list and I am restarting them. I will decrease glargine back to home dose for tonight and re-evaluate in the morning. moderate sliding scale in place of scheduled aspart, as pt was requiring over her 5 units. Will monitor, may need to change to low.    Closed fracture of proximal end of right humerus  PT/OT  Recs as per ortho      Moderate malnutrition  Nutrition consulted. Most recent weight and BMI monitored-     Measurements:  Wt Readings from Last 1 Encounters:   02/02/25 66.1 kg (145 lb 11.6 oz)   Body mass index is 24.25 kg/m².    Patient has been screened and assessed by RD.    Malnutrition Type:  Context: acute illness or injury  Level: moderate    Malnutrition Characteristic Summary:  Weight Loss (Malnutrition): other (see comments) (Does not meet criteria)  Energy Intake (Malnutrition): less than or equal to 50% for greater than or equal to 5 days  Subcutaneous Fat (Malnutrition): mild depletion  Muscle Mass (Malnutrition): mild depletion  Fluid Accumulation (Malnutrition): other (see comments) (Not present)  Hand  Strength, Right (Malnutrition): Measurably " reduced for age/gender    Interventions/Recommendations (treatment strategy):  Oral nutritional supplement;Care coordination or referral;Feeding assistance/management;Oral diet/nutrient modifications        VTE Risk Mitigation (From admission, onward)           Ordered     enoxaparin injection 40 mg  Daily         01/27/25 9446                  DVT prophylaxis  OOB  Therapy  Waiting for placement  Discharge Planning   ISATU: 2/11/2025     Code Status: Full Code   Medical Readiness for Discharge Date:   Discharge Plan A: Skilled Nursing Facility   Discharge Delays: (!) Patient and Family Barriers            Please place Justification for DME        Jordon Noble MD  Department of Hospital Medicine   [facility] - [facility] Surgical [facility]

## 2025-02-10 NOTE — PT/OT/SLP DISCHARGE
Occupational Therapy Discharge Summary    Brittany Nunn  MRN: [id number]   Principal Problem: Physical deconditioning      Patient Discharged from acute Occupational Therapy on 02/07/25.  Please refer to prior OT note dated 02/07/25 for functional status.    Assessment:      Patient appropriate for care in another setting.    Objective:     GOALS:   Multidisciplinary Problems       Occupational Therapy Goals       Not on file              Multidisciplinary Problems (Resolved)          Problem: Occupational Therapy    Goal Priority Disciplines Outcome Interventions   Occupational Therapy Goal   (Resolved)     OT, PT/OT Met    Description:   Patient will increase functional independence with ADLs by performing:    LE Dressing with Minimal Assistance.(Goal Not Met)  Toileting from bedside commode with Contact Guard Assistance for hygiene and clothing management. (Goal Not Met)  Bathing from  shower chair/bench with Minimal Assistance.(Goal Not Met)  Toilet transfer to bedside commode with Contact Guard Assistance. (Goal Met)                         Reasons for Discontinuation of Therapy Services  Transfer to alternate level of care.      Plan:     Patient Discharged to: Skilled Nursing Facility for cont therapy.    2/10/2025

## 2025-02-10 NOTE — PT/OT/SLP DISCHARGE
Speech Language Pathology Discharge Summary    Brittany Nunn  MRN: [id number]   Physical deconditioning     Date of Last Treatment Session: 02/05/25    HPI pr MD:   89 yo female admitted to swing bed today for continued therapy.  She is s/p a fall at home in which she tripped and fell on her right shoulder.  No LOC. She was sent to [facility] where she was found to have an impacted humeral head/neck fracture with some mild subluxation.  This was determined to be non-surgical.  She does c/o pain to her shoulder otherwise she stats she is calm.  Currently no N/V/D/C.  No fever/chills.  NO chest pian/SOB.  She will be admitted for continued PT/OT services and pain control.    Status at initiation of therapy: dementia dx     Treatment Area(s):  Cognition / dementia education and training    Goals:   Multidisciplinary Problems       SLP Goals       Not on file              Multidisciplinary Problems (Resolved)          Problem: SLP    Goal Priority Disciplines Outcome   SLP Goal   (Resolved)     SLP Met   Description: LTGs:  Pt will demonstrate improved recall and use of strategies for improved communication, safety, and participation in ADLs.    STGs:  Pt/family will participate in dementia education/training and complete teach-back for improved communication and strategy use to increase safety and participation in ADLs.   Pt will demonstrate recall and use of strategies with min cues for improved communication, safety, and participation in ADLs.                        Participation in Treatment (at discharge):  Cooperative    Functional Status at time of Discharge:    Cognition: Patient demonstrates moderate cognitive deficits. MOCA: 16/30     Communication: Patient demonstrates minimal communication deficits.    Language: Patient demonstrates minimal language deficits 2/2 cognitive deficits    Motor Speech: Patient demonstrates no motor speech deficits.    Swallow: Patient demonstrates no dysphagia.                      Clinical Bedside assessment was not completed                       Instrumental assessment was not completed                          Patient is discharged to Skilled nursing facility               Pt and family participated in patient-specific dementia education and training. MOCA testing and results (16/30) explained in depth.     Bebe Pappas MA, CCC-SLP  02/10/2025